# Patient Record
Sex: MALE | Race: BLACK OR AFRICAN AMERICAN | Employment: FULL TIME | URBAN - METROPOLITAN AREA
[De-identification: names, ages, dates, MRNs, and addresses within clinical notes are randomized per-mention and may not be internally consistent; named-entity substitution may affect disease eponyms.]

---

## 2017-02-02 RX ORDER — AMLODIPINE BESYLATE 5 MG/1
TABLET ORAL
Qty: 120 TAB | Refills: 12 | Status: SHIPPED | OUTPATIENT
Start: 2017-02-02 | End: 2018-04-08 | Stop reason: SDUPTHER

## 2017-02-02 RX ORDER — HUMAN INSULIN 100 [IU]/ML
INJECTION, SUSPENSION SUBCUTANEOUS
Qty: 80 VIAL | Refills: 0 | Status: SHIPPED | OUTPATIENT
Start: 2017-02-02 | End: 2017-02-17 | Stop reason: SDUPTHER

## 2017-02-02 RX ORDER — PEN NEEDLE, DIABETIC 29 G X1/2"
NEEDLE, DISPOSABLE MISCELLANEOUS
Qty: 100 SYRINGE | Refills: 12 | Status: SHIPPED | OUTPATIENT
Start: 2017-02-02 | End: 2018-03-30 | Stop reason: SDUPTHER

## 2017-02-17 ENCOUNTER — OFFICE VISIT (OUTPATIENT)
Dept: ENDOCRINOLOGY | Age: 57
End: 2017-02-17

## 2017-02-17 VITALS
SYSTOLIC BLOOD PRESSURE: 148 MMHG | BODY MASS INDEX: 28.37 KG/M2 | DIASTOLIC BLOOD PRESSURE: 88 MMHG | HEART RATE: 54 BPM | HEIGHT: 68 IN | WEIGHT: 187.2 LBS

## 2017-02-17 DIAGNOSIS — E10.319 UNCONTROLLED TYPE 1 DIABETES MELLITUS WITH RETINOPATHY, MACULAR EDEMA PRESENCE UNSPECIFIED, UNSPECIFIED RETINOPATHY SEVERITY: Primary | ICD-10-CM

## 2017-02-17 DIAGNOSIS — E78.00 HYPERCHOLESTEROLEMIA: ICD-10-CM

## 2017-02-17 DIAGNOSIS — I10 ESSENTIAL HYPERTENSION, BENIGN: ICD-10-CM

## 2017-02-17 DIAGNOSIS — E10.65 UNCONTROLLED TYPE 1 DIABETES MELLITUS WITH RETINOPATHY, MACULAR EDEMA PRESENCE UNSPECIFIED, UNSPECIFIED RETINOPATHY SEVERITY: Primary | ICD-10-CM

## 2017-02-17 LAB — HBA1C MFR BLD HPLC: 8.3 %

## 2017-02-17 NOTE — PROGRESS NOTES
Chief Complaint   Patient presents with    Diabetes     pcp and pharmacy confirmed    Labs     done     History of Present Illness: Ashlie Mejia is a 60 y.o. male here for follow up of diabetes. Weight up 7 lbs since last visit in 10/16. Has still been taking amlodipine 2 tabs every morning. Ended up stopping the evening dose of doxazosin sometime in the past 3 months as he was having dizziness with this and this has stopped with being off this pill. Does check his BP at home and mostly gets readings in the 120-130s but has seen some in the 140s but none over 150 recently. Did have an episode of low blood sugar in the middle of the night in 11/16 and his wife called EMS and sugar was down to 30 and he was treated and never needed to go to the ER. Review of his log book shows widely fluctuating readings as low as the 30s to some over 400s and at times will eat something at bedtime like 1/2 pb sandwich when his sugar is under 130 at bedtime and then can have a high reading the next morning. Current Outpatient Prescriptions   Medication Sig    insulin NPH/insulin regular (NOVOLIN 70/30) 100 unit/mL (70-30) injection INJECT 30 UNITS SUBCUTANEOUSLY IN THE MORNING AND 30 UNITS IN THE EVENING + 4 units for every 50 mg/dl above 150 mg/dl--Dose change 2/17/17--updated med list--did not send prescription to the pharmacy    amLODIPine (NORVASC) 5 mg tablet TAKE 2 TABLETS BY MOUTH DAILY    BD INSULIN SYRINGE ULTRA-FINE 1 mL 30 gauge x 1/2\" syrg USE AS DIRECTED    tadalafil (CIALIS) 20 mg tablet TAKE 1 TABLET BY MOUTH 1 HOUR PRIOR TO SEX ON EMPTY STOMACH    acetaminophen (TYLENOL) 325 mg tablet Take  by mouth every four (4) hours as needed for Pain.  glucose blood VI test strips (ONETOUCH ULTRA TEST) strip USE AS DIRECTED    atorvastatin (LIPITOR) 80 mg tablet TAKE 1 TABLET BY MOUTH EVERY DAY    doxazosin (CARDURA) 2 mg tablet Take 2 mg by mouth nightly.      No current facility-administered medications for this visit. Allergies   Allergen Reactions    Benazepril Other (comments)     hyperkalemia     Review of Systems:  - Eyes: no blurry vision or double vision  - Cardiovascular: no chest pain  - Respiratory: no shortness of breath  - Musculoskeletal: no myalgias  - Neurological: no numbness/tingling in extremities    Physical Examination:  Blood pressure 152/73, pulse (!) 54, height 5' 8\" (1.727 m), weight 187 lb 3.2 oz (84.9 kg). Repeat manually 148/88 in left arm.  - General: pleasant, no distress, good eye contact   - Neck: no carotid bruits  - Cardiovascular: regular, normal rate, nl s1 and s2, no m/r/g,   - Respiratory: clear bilaterally  - Integumentary: no edema,   - Psychiatric: normal mood and affect    Data Reviewed:   Component      Latest Ref Rng & Units 2/17/2017           4:20 PM   Hemoglobin A1c (POC)      % 8.3       Assessment/Plan:     1. Type 1 diabetes, uncontrolled, with retinopathy ([facility] [address]): his most recent Hgb A1c was 8.3% in 2/17 down from 9.5% in 10/16 stable from 7/16 down from 10% in 3/16. Control is improving but still having a lot of variability in his readings. Will increase his morning dose to get pre-dinner sugars higher and decrease dinner dose to avoid overnight lows. May benefit from basal/bolus regimen in the future but has been on pre-mix the whole time so will continue this for now. - increase 70/30 insulin to 36 units before breakfast and decrease to 24 dinner + 4 units for every 50 mg/dl above 150 mg/dl  - check bs 3 times per day  - foot exam done 7/16  - microalbumin 106 in 3/16, down to 57 in 10/16--may need to add back low dose ACE in the future but will be careful of hyperkalemia as had this with benazepril  - due for eye exam  - check Hgb A1c, cmp, and microalbumin prior to next visit      2. Essential hypertension, benign: his BP was above goal < 140/90 off the doxazosin so will restart 1/2 tab at bedtime.   Does have history of hyperaldosteronism that was surgically treated by left adrenalectomy in 3/12.  - cont amlodipine 5 mg 2 tabs daily  - restart doxazosin 2 mg 1/2 tab at bedtime  - check bmp today      3. Hypercholesterolemia: Given DM, Goal LDL < 100, non-HDL < 130, and TG < 150.  in 3/16. Up to 171 in 7/16 with non-compliance but down to 93 in 10/16 with compliance  - cont lipitor 80 mg daily  - check lipids prior to next visit       Patient Instructions   1) Take your insulin 10-15 minutes BEFORE you eat breakfast and dinner and adjust the dose based on the scale below:  Blood sugar  Breakfast   Dinner          Less than 90  Eat first, take 26 units  Eat first, take 20     36 units   24     151-200  40 units   28   201-250  44 units   32  251-300  48 units   36   301-350  52 units   40   351-400  56 units   44  401-450  60 units   48  451-500  64 units   52  501-550  68 units   56  551-600  72 units   60  Over 600  76 units   65    2) Collect readings over the next 2 weeks and drop them off at my office for both sugar and blood pressure. 3) Restart 1/2 of the 2 mg doxazosin tab at bedtime. The goal is to keep all of your blood pressure readings under 140/90. 4) Your Hemoglobin A1c (3 month test of blood sugar) was 8.3% which is a good improvement but I'm concerned it may be lower due to a lot of the low readings you had under 60. We will try to get a similar or better A1c with less variability of your readings. 5) I will mail you a lab slip about 3-4 weeks before your next visit to have your labs repeated 3-4 days prior to your next visit. Follow-up Disposition:  Return in about 4 months (around 6/17/2017).     Copy sent to:  Dr. Francis Ray via [employer] [facility]    Lab follow up: 2/18/17    Sent him the following message in a letter:    METABOLIC PANEL, BASIC   Result Value Ref Range    Glucose 195 (H) 65 - 99 mg/dL    BUN 20 6 - 24 mg/dL    Creatinine 1.39 (H) 0.76 - 1.27 mg/dL    GFR est non-AA 55 (L) >59 mL/min/1.73    GFR est AA 64 >59 mL/min/1.73    BUN/Creatinine ratio 14 9 - 20    Sodium 144 134 - 144 mmol/L    Potassium 4.7 3.5 - 5.2 mmol/L    Chloride 104 96 - 106 mmol/L    CO2 27 18 - 29 mmol/L    Calcium 9.4 8.7 - 10.2 mg/dL    Narrative    Performed at:  [address]  [id number]  : Jarvis Cardoza MD, Phone:  [phone]   CKD REPORT   Result Value Ref Range    Interpretation Note       Comment:      Supplement report is available. Narrative    Performed at:  [facility] [place]  [address] [place] [address]  [id number]  : Carlos Borges PhD, Phone:  [phone]       BUN and creatinine are markers of kidney function. Your creatinine is slightly abnormal and up from 1.28 at last check. Hopefully with better control of your diabetes, we will prevent any worsening of your kidney function.     Your electrolytes (sodium, potassium, and calcium) are all normal.

## 2017-02-17 NOTE — MR AVS SNAPSHOT
Visit Information Date & Time Provider Department Dept. Phone Encounter #  
 2/17/2017  3:30 PM Rex Nicole, [address] [facility] [phone] [id number] Follow-up Instructions Return in about 4 months (around 6/17/2017). Your Appointments 3/27/2017  3:45 PM  
ROUTINE CARE with Tamanna Turpin MD  
[facility] [address]) Appt Note: 6 mon fu/ Labs; same [address] [place] [phone] [phone] [address] [facility] [address] Upcoming Health Maintenance Date Due HEMOGLOBIN A1C Q6M 4/10/2017 FOOT EXAM Q1 7/25/2017 MICROALBUMIN Q1 10/10/2017 LIPID PANEL Q1 10/10/2017 EYE EXAM RETINAL OR DILATED Q1 11/30/2017 COLONOSCOPY 4/10/2019 Pneumococcal 19-64 Highest Risk (3 of 3 - PPSV23) 9/15/2021 DTaP/Tdap/Td series (2 - Td) 9/15/2026 Allergies as of 2/17/2017  Review Complete On: 2/17/2017 By: Rex Nicole MD  
  
 Severity Noted Reaction Type Reactions Benazepril  09/21/2015    Other (comments)  
 hyperkalemia Current Immunizations  Reviewed on 1/18/2013 Name Date Influenza Vaccine 10/31/2013, 10/29/2012 Influenza Vaccine Split 9/30/2010 Influenza Vaccine Whole 11/28/2011 Pneumococcal Vaccine (Pcv) 10/30/2004 Not reviewed this visit You Were Diagnosed With   
  
 Codes Comments Uncontrolled type 1 diabetes mellitus with retinopathy, macular edema presence unspecified, unspecified retinopathy severity ([facility] [address]    -  Primary ICD-10-CM: E10.319, E10.65 ICD-9-CM: 250.53, 362.01 Essential hypertension, benign     ICD-10-CM: I10 
ICD-9-CM: 401.1 Hypercholesterolemia     ICD-10-CM: E78.00 ICD-9-CM: 272.0 Vitals BP Pulse Height(growth percentile) Weight(growth percentile) BMI Smoking Status  152/73 (BP 1 Location: Left arm, BP Patient Position: Sitting) (!) 54 5' 8\" (1.727 m) 187 lb 3.2 oz (84.9 kg) 28.46 kg/m2 Never Smoker Vitals History BMI and BSA Data Body Mass Index Body Surface Area  
 28.46 kg/m 2 2.02 m 2 Preferred Pharmacy Pharmacy Name Phone [address] [facility] Liza Sutherland [phone] Your Updated Medication List  
  
   
This list is accurate as of: 2/17/17  4:42 PM.  Always use your most recent med list. amLODIPine 5 mg tablet Commonly known as:  Funez [place] TAKE 2 TABLETS BY MOUTH DAILY  
  
 atorvastatin 80 mg tablet Commonly known as:  LIPITOR  
TAKE 1 TABLET BY MOUTH EVERY DAY  
  
 BD INSULIN SYRINGE ULTRA-FINE 1 mL 30 gauge x 1/2\" Syrg Generic drug:  Insulin Syringe-Needle U-100  
USE AS DIRECTED  
  
 doxazosin 2 mg tablet Commonly known as:  CARDURA Take 1 mg by mouth nightly. glucose blood VI test strips strip Commonly known as:  ONETOUCH ULTRA TEST  
USE AS DIRECTED  
  
 insulin NPH/insulin regular 100 unit/mL (70-30) injection Commonly known as:  NovoLIN 70/30 INJECT 36 UNITS SUBCUTANEOUSLY IN THE MORNING AND 24 UNITS IN THE EVENING + 4 units for every 50 mg/dl above 150 mg/dl--Dose change 2/17/17--updated med list--did not send prescription to the pharmacy  
  
 tadalafil 20 mg tablet Commonly known as:  CIALIS  
TAKE 1 TABLET BY MOUTH 1 HOUR PRIOR TO SEX ON EMPTY STOMACH  
  
 TYLENOL 325 mg tablet Generic drug:  acetaminophen Take  by mouth every four (4) hours as needed for Pain. We Performed the Following AMB POC HEMOGLOBIN A1C [10226 CPT(R)] METABOLIC PANEL, BASIC [82681 CPT(R)] OR COLLECTION VENOUS BLOOD,VENIPUNCTURE D907029 CPT(R)] OR HANDLG&/OR CONVEY OF SPEC FOR TR OFFICE TO LAB [68874 CPT(R)] Follow-up Instructions Return in about 4 months (around 6/17/2017). Patient Instructions 1) Take your insulin 10-15 minutes BEFORE you eat breakfast and dinner and adjust the dose based on the scale below: 
Blood sugar  Breakfast   Dinner Less than 90  Eat first, take 26 units  Eat first, take 20 
   36 units   24    
151-200  40 units   28  
201-250  44 units   32 
251-300  48 units   36  
301-350  52 units   40  
351-400  56 units   44 
401-450  60 units   48 
451-500  64 units   52 
501-550  68 units   56 
551-600  72 units   60 Over 600  76 units   65 
 
2) Collect readings over the next 2 weeks and drop them off at my office for both sugar and blood pressure. 3) Restart 1/2 of the 2 mg doxazosin tab at bedtime. The goal is to keep all of your blood pressure readings under 140/90. 4) Your Hemoglobin A1c (3 month test of blood sugar) was 8.3% which is a good improvement but I'm concerned it may be lower due to a lot of the low readings you had under 60. We will try to get a similar or better A1c with less variability of your readings. 5) I will mail you a lab slip about 3-4 weeks before your next visit to have your labs repeated 3-4 days prior to your next visit. Introducing [address]! [employer] [facility] introduces [employer] patient portal. Now you can access parts of your medical record, email your doctor's office, and request medication refills online. 1. In your internet browser, go to [email or web address] 2. Click on the First Time User? Click Here link in the Sign In box. You will see the New Member Sign Up page. 3. Enter your [employer] Access Code exactly as it appears below. You will not need to use this code after youve completed the sign-up process. If you do not sign up before the expiration date, you must request a new code. · [employer] Access Code: [email or web address] Expires: 5/18/2017  3:37 PM 
 
4. Enter the last four digits of your Social Security Number (xxxx) and Date of Birth (mm/dd/yyyy) as indicated and click Submit. You will be taken to the next sign-up page. 5. Create a SocialThreader ID. This will be your SocialThreader login ID and cannot be changed, so think of one that is secure and easy to remember. 6. Create a SocialThreader password. You can change your password at any time. 7. Enter your Password Reset Question and Answer. This can be used at a later time if you forget your password. 8. Enter your e-mail address. You will receive e-mail notification when new information is available in [address] 9. Click Sign Up. You can now view and download portions of your medical record. 10. Click the Download Summary menu link to download a portable copy of your medical information. If you have questions, please visit the Frequently Asked Questions section of the SocialThreader website. Remember, Social[employer]er is NOT to be used for urgent needs. For medical emergencies, dial 911. Now available from your iPhone and Android! Please provide this summary of care documentation to your next provider. Your primary care clinician is listed as Amanda Bansal. If you have any questions after today's visit, please call [phone].

## 2017-02-17 NOTE — PATIENT INSTRUCTIONS
1) Take your insulin 10-15 minutes BEFORE you eat breakfast and dinner and adjust the dose based on the scale below:  Blood sugar  Breakfast   Dinner          Less than 90  Eat first, take 26 units  Eat first, take 20     36 units   24     151-200  40 units   28   201-250  44 units   32  251-300  48 units   36   301-350  52 units   40   351-400  56 units   44  401-450  60 units   48  451-500  64 units   52  501-550  68 units   56  551-600  72 units   60  Over 600  76 units   65    2) Collect readings over the next 2 weeks and drop them off at my office for both sugar and blood pressure. 3) Restart 1/2 of the 2 mg doxazosin tab at bedtime. The goal is to keep all of your blood pressure readings under 140/90. 4) Your Hemoglobin A1c (3 month test of blood sugar) was 8.3% which is a good improvement but I'm concerned it may be lower due to a lot of the low readings you had under 60. We will try to get a similar or better A1c with less variability of your readings. 5) I will mail you a lab slip about 3-4 weeks before your next visit to have your labs repeated 3-4 days prior to your next visit.

## 2017-02-18 LAB
BUN SERPL-MCNC: 20 MG/DL (ref 6–24)
BUN/CREAT SERPL: 14 (ref 9–20)
CALCIUM SERPL-MCNC: 9.4 MG/DL (ref 8.7–10.2)
CHLORIDE SERPL-SCNC: 104 MMOL/L (ref 96–106)
CO2 SERPL-SCNC: 27 MMOL/L (ref 18–29)
CREAT SERPL-MCNC: 1.39 MG/DL (ref 0.76–1.27)
GLUCOSE SERPL-MCNC: 195 MG/DL (ref 65–99)
INTERPRETATION: NORMAL
POTASSIUM SERPL-SCNC: 4.7 MMOL/L (ref 3.5–5.2)
SODIUM SERPL-SCNC: 144 MMOL/L (ref 134–144)

## 2017-03-17 ENCOUNTER — TELEPHONE (OUTPATIENT)
Dept: ENDOCRINOLOGY | Age: 57
End: 2017-03-17

## 2017-03-17 NOTE — TELEPHONE ENCOUNTER
I have received patients home glucose log dated 2/17 - 3/9/16. Per Dr. Casimiro Salvador note he is on the current regimen (please also confirm with patient that they are taking the following:     Mixed 70/30 insulin:   - 36 units with breakfast  - 24 units with dinner  - Correction sliding scale (4:50>150)    Review of blood sugars:  AM: Variable, , occasional 57, 64, 300's about 1x/week   Dinner: 789-925 with nothing lower  Bedtime: Variable  (mostly 160-230)    Blood pressures have been pretty stable past two weeks as well. Plan:   My suspicion is that we need to again increase the AM dose, but with careful attention to the evening dose to avoid lows at bedtime. We will increase AM dose by 10%. With respect to being mostly high at bedtime, not so sure that a reduction is warranted, however patient to have glucose tabs available at bedside for the occasional low. Increase Breakfast dose to 40 units  Continue Dinner dose of 24 untis  Continue to check glucose AM/Dinner/HS    Recommend they send another glucose log after 1-2 weeks for Dr. KEITH [facility] to review, I will forward this message for him to review.

## 2017-03-22 ENCOUNTER — TELEPHONE (OUTPATIENT)
Dept: ENDOCRINOLOGY | Age: 57
End: 2017-03-22

## 2017-03-22 NOTE — TELEPHONE ENCOUNTER
Patient is returning a call from 03/17/2017. He states that [place] gave  her name, however did not leave a message. Patient is wondering if you would know the nature of the call? Patient contact: [phone] (Home).     Thanks  Benjamin Vega

## 2017-03-27 ENCOUNTER — OFFICE VISIT (OUTPATIENT)
Dept: FAMILY MEDICINE CLINIC | Age: 57
End: 2017-03-27

## 2017-03-27 VITALS
HEIGHT: 68 IN | RESPIRATION RATE: 14 BRPM | OXYGEN SATURATION: 98 % | HEART RATE: 54 BPM | TEMPERATURE: 98.3 F | DIASTOLIC BLOOD PRESSURE: 61 MMHG | BODY MASS INDEX: 28.04 KG/M2 | WEIGHT: 185 LBS | SYSTOLIC BLOOD PRESSURE: 133 MMHG

## 2017-03-27 DIAGNOSIS — E78.00 HYPERCHOLESTEROLEMIA: Primary | ICD-10-CM

## 2017-03-27 RX ORDER — TADALAFIL 5 MG/1
TABLET ORAL
COMMUNITY
Start: 2016-12-05 | End: 2016-12-05

## 2017-03-27 RX ORDER — ATORVASTATIN CALCIUM 40 MG/1
TABLET, FILM COATED ORAL
COMMUNITY
Start: 2016-12-05 | End: 2016-12-05

## 2017-03-27 RX ORDER — DOXAZOSIN 2 MG/1
1 TABLET ORAL
Qty: 45 TAB | Refills: 3 | Status: SHIPPED | OUTPATIENT
Start: 2017-03-27 | End: 2018-10-15

## 2017-03-27 NOTE — PROGRESS NOTES
Chief Complaint   Patient presents with    Hypertension    Diabetes    Cholesterol Problem     1. Have you been to the ER, urgent care clinic since your last visit? Hospitalized since your last visit? No    2. Have you seen or consulted any other health care providers outside of the [address] [place] [address] since your last visit? Include any pap smears or colon screening. No    There are no preventive care reminders to display for this patient.

## 2017-03-27 NOTE — MR AVS SNAPSHOT
Visit Information Date & Time Provider Department Dept. Phone Encounter #  
 3/27/2017  3:45 PM Owen Walker MD [facility] [phone] [id number] Follow-up Instructions Return in about 6 months (around 9/27/2017). Your Appointments 6/26/2017  3:30 PM  
Follow Up with Lester Corbett MD  
[facility] [address] [place] [address]) Appt Note: 4m f/u  
 [address] [phone] [address] [facility] Upcoming Health Maintenance Date Due  
 FOOT EXAM Q1 7/25/2017 HEMOGLOBIN A1C Q6M 8/17/2017 MICROALBUMIN Q1 10/10/2017 LIPID PANEL Q1 10/10/2017 EYE EXAM RETINAL OR DILATED Q1 11/30/2017 COLONOSCOPY 4/10/2019 Pneumococcal 19-64 Highest Risk (3 of 3 - PPSV23) 9/15/2021 DTaP/Tdap/Td series (2 - Td) 9/15/2026 Allergies as of 3/27/2017  Review Complete On: 3/27/2017 By: Owen Walker MD  
  
 Severity Noted Reaction Type Reactions Benazepril  09/21/2015    Other (comments)  
 hyperkalemia Current Immunizations  Reviewed on 1/18/2013 Name Date Influenza Vaccine 10/31/2013, 10/29/2012 Influenza Vaccine Split 9/30/2010 Influenza Vaccine Whole 11/28/2011 Pneumococcal Vaccine (Pcv) 10/30/2004 Not reviewed this visit You Were Diagnosed With   
  
 Codes Comments Hypercholesterolemia    -  Primary ICD-10-CM: E78.00 ICD-9-CM: 272.0 Vitals BP Pulse Temp Resp Height(growth percentile) Weight(growth percentile) 133/61 (!) 54 98.3 °F (36.8 °C) (Oral) 14 5' 8\" (1.727 m) 185 lb (83.9 kg) SpO2 BMI Smoking Status 98% 28.13 kg/m2 Never Smoker Vitals History BMI and BSA Data Body Mass Index Body Surface Area  
 28.13 kg/m 2 2.01 m 2 Preferred Pharmacy Pharmacy Name Phone  [address] [facility], [facility] [phone] Your Updated Medication List  
  
   
This list is accurate as of: 3/27/17  4:42 PM.  Always use your most recent med list. amLODIPine 5 mg tablet Commonly known as:  Poole [place] TAKE 2 TABLETS BY MOUTH DAILY  
  
 atorvastatin 80 mg tablet Commonly known as:  LIPITOR  
TAKE 1 TABLET BY MOUTH EVERY DAY  
  
 BD INSULIN SYRINGE ULTRA-FINE 1 mL 30 gauge x 1/2\" Syrg Generic drug:  Insulin Syringe-Needle U-100  
USE AS DIRECTED  
  
 doxazosin 2 mg tablet Commonly known as:  CARDURA Take 0.5 Tabs by mouth nightly. glucose blood VI test strips strip Commonly known as:  ONETOUCH ULTRA TEST  
USE AS DIRECTED  
  
 insulin NPH/insulin regular 100 unit/mL (70-30) injection Commonly known as:  NovoLIN 70/30 INJECT 40 UNITS SUBCUTANEOUSLY IN THE MORNING AND 24 UNITS IN THE EVENING + 4 units for every 50 mg/dl above 150 mg/dl--Dose change 3/22/17--updated med list--did not send prescription to the pharmacy  
  
 tadalafil 20 mg tablet Commonly known as:  CIALIS  
TAKE 1 TABLET BY MOUTH 1 HOUR PRIOR TO SEX ON EMPTY STOMACH  
  
 TYLENOL 325 mg tablet Generic drug:  acetaminophen Take  by mouth every four (4) hours as needed for Pain. Prescriptions Sent to Pharmacy Refills  
 doxazosin (CARDURA) 2 mg tablet 3 Sig: Take 0.5 Tabs by mouth nightly. Class: Normal  
 Pharmacy: [address] [place] [address] [id number]  #: [phone] Route: Oral  
  
We Performed the Following LIPID PANEL [49273 CPT(R)] Follow-up Instructions Return in about 6 months (around 9/27/2017). Introducing [facility]! Graeme Julien introduces [employer] patient portal. Now you can access parts of your medical record, email your doctor's office, and request medication refills online. 1. In your internet browser, go to [email or web address] 2. Click on the First Time User? Click Here link in the Sign In box. You will see the New Member Sign Up page. 3. Enter your [employer] Access Code exactly as it appears below. You will not need to use this code after youve completed the sign-up process. If you do not sign up before the expiration date, you must request a new code. · [employer] Access Code: [email or web address] Expires: 5/18/2017  4:37 PM 
 
4. Enter the last four digits of your Social Security Number (xxxx) and Date of Birth (mm/dd/yyyy) as indicated and click Submit. You will be taken to the next sign-up page. 5. Create a [employer] ID. This will be your [employer] login ID and cannot be changed, so think of one that is secure and easy to remember. 6. Create a [employer] password. You can change your password at any time. 7. Enter your Password Reset Question and Answer. This can be used at a later time if you forget your password. 8. Enter your e-mail address. You will receive e-mail notification when new information is available in [address] 9. Click Sign Up. You can now view and download portions of your medical record. 10. Click the Download Summary menu link to download a portable copy of your medical information. If you have questions, please visit the Frequently Asked Questions section of the [employer] website. Remember, [employer] is NOT to be used for urgent needs. For medical emergencies, dial 911. Now available from your iPhone and Android! Please provide this summary of care documentation to your next provider. Your primary care clinician is listed as Jessa Santos. If you have any questions after today's visit, please call [phone].

## 2017-03-27 NOTE — PROGRESS NOTES
HISTORY OF PRESENT ILLNESS  Jory Gómez is a 60 y.o. male. HPI Pt. Comes in for blood pressure, cholesterol, and diabetes check. Followed by Dr. Gayathri Valdez for diabetes. No complaints of chest pain, shortness of breath, TIAs, claudication or edema. Daughter going to [address] [place] [address] as a freshman. ROS    Physical Exam   Constitutional: He appears well-developed and well-nourished. HENT:   Right Ear: External ear normal.   Left Ear: External ear normal.   Mouth/Throat: Oropharynx is clear and moist.   Neck: No thyromegaly present. Cardiovascular: Normal rate, regular rhythm, normal heart sounds and intact distal pulses. Pulmonary/Chest: Effort normal and breath sounds normal. No respiratory distress. He has no wheezes. Abdominal: Soft. Bowel sounds are normal. He exhibits no distension and no mass. There is no tenderness. There is no guarding. Musculoskeletal: Normal range of motion. He exhibits no edema. Lymphadenopathy:     He has no cervical adenopathy. Nursing note and vitals reviewed. ASSESSMENT and PLAN  Orders Placed This Encounter    LIPID PANEL    doxazosin (CARDURA) 2 mg tablet     Peterson Carbajal was seen today for hypertension, diabetes and cholesterol problem. Diagnoses and all orders for this visit:    Hypercholesterolemia  -     LIPID PANEL    Other orders  -     doxazosin (CARDURA) 2 mg tablet; Take 0.5 Tabs by mouth nightly. Follow-up Disposition:  Return in about 6 months (around 9/27/2017).

## 2017-03-28 LAB
CHOLEST SERPL-MCNC: 182 MG/DL (ref 100–199)
HDLC SERPL-MCNC: 73 MG/DL
INTERPRETATION, 910389: NORMAL
LDLC SERPL CALC-MCNC: 94 MG/DL (ref 0–99)
TRIGL SERPL-MCNC: 77 MG/DL (ref 0–149)
VLDLC SERPL CALC-MCNC: 15 MG/DL (ref 5–40)

## 2017-04-21 RX ORDER — BLOOD SUGAR DIAGNOSTIC
STRIP MISCELLANEOUS
Qty: 300 STRIP | Refills: 3 | Status: SHIPPED | OUTPATIENT
Start: 2017-04-21 | End: 2018-07-06

## 2017-04-21 RX ORDER — ATORVASTATIN CALCIUM 80 MG/1
TABLET, FILM COATED ORAL
Qty: 90 TAB | Refills: 1 | Status: SHIPPED | OUTPATIENT
Start: 2017-04-21 | End: 2018-07-31 | Stop reason: SDUPTHER

## 2017-05-17 ENCOUNTER — TELEPHONE (OUTPATIENT)
Dept: ENDOCRINOLOGY | Age: 57
End: 2017-05-17

## 2017-05-17 DIAGNOSIS — E78.00 HYPERCHOLESTEROLEMIA: Primary | ICD-10-CM

## 2017-05-17 DIAGNOSIS — E10.319 UNCONTROLLED TYPE 1 DIABETES MELLITUS WITH RETINOPATHY, MACULAR EDEMA PRESENCE UNSPECIFIED, UNSPECIFIED LATERALITY, UNSPECIFIED RETINOPATHY SEVERITY: ICD-10-CM

## 2017-05-17 DIAGNOSIS — E10.65 UNCONTROLLED TYPE 1 DIABETES MELLITUS WITH RETINOPATHY, MACULAR EDEMA PRESENCE UNSPECIFIED, UNSPECIFIED LATERALITY, UNSPECIFIED RETINOPATHY SEVERITY: ICD-10-CM

## 2017-05-17 NOTE — TELEPHONE ENCOUNTER
----- Message from Sophia Bee MD sent at 5/17/2017  8:19 AM EDT -----      ----- Message -----     From: Sophia Bee MD     Sent: 5/17/2017       To: Sophia Bee MD    Mail him a lab slip

## 2017-06-20 LAB
ALBUMIN SERPL-MCNC: 4.3 G/DL (ref 3.5–5.5)
ALBUMIN/CREAT UR: 57.5 MG/G CREAT (ref 0–30)
ALBUMIN/GLOB SERPL: 1.5 {RATIO} (ref 1.2–2.2)
ALP SERPL-CCNC: 106 IU/L (ref 39–117)
ALT SERPL-CCNC: 17 IU/L (ref 0–44)
AST SERPL-CCNC: 16 IU/L (ref 0–40)
BILIRUB SERPL-MCNC: 0.4 MG/DL (ref 0–1.2)
BUN SERPL-MCNC: 33 MG/DL (ref 6–24)
BUN/CREAT SERPL: 22 (ref 9–20)
CALCIUM SERPL-MCNC: 9.5 MG/DL (ref 8.7–10.2)
CHLORIDE SERPL-SCNC: 101 MMOL/L (ref 96–106)
CHOLEST SERPL-MCNC: 200 MG/DL (ref 100–199)
CO2 SERPL-SCNC: 27 MMOL/L (ref 18–29)
CREAT SERPL-MCNC: 1.49 MG/DL (ref 0.76–1.27)
CREAT UR-MCNC: 161.5 MG/DL
EST. AVERAGE GLUCOSE BLD GHB EST-MCNC: 212 MG/DL
GLOBULIN SER CALC-MCNC: 2.9 G/DL (ref 1.5–4.5)
GLUCOSE SERPL-MCNC: 144 MG/DL (ref 65–99)
HBA1C MFR BLD: 9 % (ref 4.8–5.6)
HDLC SERPL-MCNC: 73 MG/DL
INTERPRETATION, 910389: NORMAL
INTERPRETATION: NORMAL
LDLC SERPL CALC-MCNC: 110 MG/DL (ref 0–99)
MICROALBUMIN UR-MCNC: 92.9 UG/ML
PDF IMAGE, 910387: NORMAL
POTASSIUM SERPL-SCNC: 4.6 MMOL/L (ref 3.5–5.2)
PROT SERPL-MCNC: 7.2 G/DL (ref 6–8.5)
SODIUM SERPL-SCNC: 141 MMOL/L (ref 134–144)
TRIGL SERPL-MCNC: 85 MG/DL (ref 0–149)
VLDLC SERPL CALC-MCNC: 17 MG/DL (ref 5–40)

## 2017-06-26 ENCOUNTER — OFFICE VISIT (OUTPATIENT)
Dept: ENDOCRINOLOGY | Age: 57
End: 2017-06-26

## 2017-06-26 VITALS
DIASTOLIC BLOOD PRESSURE: 69 MMHG | SYSTOLIC BLOOD PRESSURE: 132 MMHG | WEIGHT: 181.4 LBS | HEIGHT: 68 IN | HEART RATE: 57 BPM | BODY MASS INDEX: 27.49 KG/M2

## 2017-06-26 DIAGNOSIS — E10.319 UNCONTROLLED TYPE 1 DIABETES MELLITUS WITH RETINOPATHY, MACULAR EDEMA PRESENCE UNSPECIFIED, UNSPECIFIED LATERALITY, UNSPECIFIED RETINOPATHY SEVERITY: Primary | ICD-10-CM

## 2017-06-26 DIAGNOSIS — E10.65 UNCONTROLLED TYPE 1 DIABETES MELLITUS WITH RETINOPATHY, MACULAR EDEMA PRESENCE UNSPECIFIED, UNSPECIFIED LATERALITY, UNSPECIFIED RETINOPATHY SEVERITY: Primary | ICD-10-CM

## 2017-06-26 DIAGNOSIS — E78.00 HYPERCHOLESTEROLEMIA: ICD-10-CM

## 2017-06-26 DIAGNOSIS — I10 ESSENTIAL HYPERTENSION, BENIGN: ICD-10-CM

## 2017-06-26 NOTE — PROGRESS NOTES
Chief Complaint   Patient presents with    Diabetes    Cholesterol Problem     History of Present Illness: Henrique Costa is a 60 y.o. male here for follow up of diabetes. Weight down 6 lbs since last visit in 2/17. Has been taking the doxazosin 1/2 tab about 5 times a week at night and this has helped his BP. Has not been following the scale I gave him as directed for the dinner dose as he is afraid of his sugars going low as he doesn't feel his low sugars as well as he used to. As a result his blood sugars continue to be very erratic with some readings down to the 50-60s fasting and other up over 300. He doesn't write down how much insulin he took so it's difficult to assess trends. He is willing to start keeping better records of his insulin doses so I can help him gain better control. Current Outpatient Prescriptions   Medication Sig    atorvastatin (LIPITOR) 80 mg tablet TAKE 1 TABLET BY MOUTH EVERY DAY    ONETOUCH ULTRA TEST strip USE AS DIRECTED    doxazosin (CARDURA) 2 mg tablet Take 0.5 Tabs by mouth nightly.  insulin NPH/insulin regular (NOVOLIN 70/30) 100 unit/mL (70-30) injection INJECT 40 UNITS SUBCUTANEOUSLY IN THE MORNING AND 24 UNITS IN THE EVENING + 4 units for every 50 mg/dl above 150 mg/dl--Dose change 3/22/17--updated med list--did not send prescription to the pharmacy    amLODIPine (NORVASC) 5 mg tablet TAKE 2 TABLETS BY MOUTH DAILY    BD INSULIN SYRINGE ULTRA-FINE 1 mL 30 gauge x 1/2\" syrg USE AS DIRECTED    tadalafil (CIALIS) 20 mg tablet TAKE 1 TABLET BY MOUTH 1 HOUR PRIOR TO SEX ON EMPTY STOMACH    acetaminophen (TYLENOL) 325 mg tablet Take  by mouth every four (4) hours as needed for Pain. No current facility-administered medications for this visit.       Allergies   Allergen Reactions    Benazepril Other (comments)     hyperkalemia     Review of Systems:  - Eyes: no blurry vision or double vision  - Cardiovascular: no chest pain  - Respiratory: no shortness of breath  - Musculoskeletal: no myalgias  - Neurological: no numbness/tingling in extremities    Physical Examination:  Blood pressure 132/69, pulse (!) 57, height 5' 8\" (1.727 m), weight 181 lb 6.4 oz (82.3 kg). - General: pleasant, no distress, good eye contact   - Neck: no carotid bruits  - Cardiovascular: regular, normal rate, nl s1 and s2, no m/r/g,   - Respiratory: clear bilaterally  - Integumentary: no edema,   - Psychiatric: normal mood and affect    Data Reviewed:   Component      Latest Ref Rng & Units 6/19/2017 6/19/2017 6/19/2017 6/19/2017           3:44 PM  3:44 PM  3:44 PM  3:44 PM   Glucose      65 - 99 mg/dL   144 (H)    BUN      6 - 24 mg/dL   33 (H)    Creatinine      0.76 - 1.27 mg/dL   1.49 (H)    GFR est non-AA      >59 mL/min/1.73   51 (L)    GFR est AA      >59 mL/min/1.73   59 (L)    BUN/Creatinine ratio      9 - 20   22 (H)    Sodium      134 - 144 mmol/L   141    Potassium      3.5 - 5.2 mmol/L   4.6    Chloride      96 - 106 mmol/L   101    CO2      18 - 29 mmol/L   27    Calcium      8.7 - 10.2 mg/dL   9.5    Protein, total      6.0 - 8.5 g/dL   7.2    Albumin      3.5 - 5.5 g/dL   4.3    GLOBULIN, TOTAL      1.5 - 4.5 g/dL   2.9    A-G Ratio      1.2 - 2.2   1.5    Bilirubin, total      0.0 - 1.2 mg/dL   0.4    Alk. phosphatase      39 - 117 IU/L   106    AST      0 - 40 IU/L   16    ALT (SGPT)      0 - 44 IU/L   17    Cholesterol, total      100 - 199 mg/dL  200 (H)     Triglyceride      0 - 149 mg/dL  85     HDL Cholesterol      >39 mg/dL  73     VLDL, calculated      5 - 40 mg/dL  17     LDL, calculated      0 - 99 mg/dL  110 (H)     Creatinine, urine      Not Estab. mg/dL 161.5      Microalbumin, urine      Not Estab. ug/mL 92.9      Microalbumin/Creat. Ratio      0.0 - 30.0 mg/g creat 57.5 (H)      Hemoglobin A1c, (calculated)      4.8 - 5.6 %    9.0 (H)   Estimated average glucose      mg/dL    212       Assessment/Plan:     1.  Type 1 diabetes, uncontrolled, with retinopathy (RUSTca 75.): his most recent Hgb A1c was 9% in 6/17 up from 8.3% in 2/17 down from 9.5% in 10/16 stable from 7/16 down from 10% in 3/16. Control is wores and still having a lot of variability in his readings. Will make his dinner scale less aggressive to avoid overnight lows. May benefit from basal/bolus regimen in the future but has been on pre-mix the whole time so will continue this for now. - cont 70/30 insulin 40 units before breakfast + 4 units for every 50 mg/dl above 150 mg/dl and 24 dinner but take 2 units for every 50> 150  - check bs 3 times per day  - foot exam done 7/16  - microalbumin 106 in 3/16, down to 57 in 10/16 and stable in 6/17--may need to add back low dose ACE in the future but will be careful of hyperkalemia as had this with benazepril  - due for eye exam  - check Hgb A1c, cmp, and microalbumin prior to next visit      2. Essential hypertension, benign: his BP was at goal < 140/90 on the doxazosin. Does have history of hyperaldosteronism that was surgically treated by left adrenalectomy in 3/12.  - cont amlodipine 5 mg 2 tabs daily  - cont doxazosin 2 mg 1/2 tab at bedtime      3. Hypercholesterolemia: Given DM, Goal LDL < 100, non-HDL < 130, and TG < 150.  in 3/16. Up to 171 in 7/16 with non-compliance but down to 93 in 10/16 with compliance and 94 in 3/17 and 110 in 6/17  - cont lipitor 80 mg daily  - check lipids prior to next visit       Patient Instructions   1) Your Hemoglobin A1c is a 3 month marker of your diabetes control. Goal is less than 7.5% which means your average blood sugar is less than 170. Your Hemoglobin A1c is 9% which means your diabetes is under worse control than 8.3% at your last check. Continue to work on your diet and exercise and take all your medications as directed. 2) Check your sugar 3 times a day before breakfast, dinner and bedtime and write down how much insulin you took at breakfast and dinner and drop off in the next 2 weeks.   Follow the less aggressive below for insulin:    3) Take your insulin 10-15 minutes BEFORE you eat breakfast and dinner and adjust the dose based on the scale below:  Blood sugar  Breakfast   Dinner          Less than 90  Eat first, take 36 units  Eat first, take 20     40 units   24     151-200  44 units   26   201-250  48 units   28  251-300  52 units   30   301-350  56 units   32   351-400  60 units   34  401-450  64 units   36  451-500  68 units   38  501-550  72 units   40  551-600  76 units   42  Over 600  80 units   44    4) Your blood pressure was much better. Keep taking the 1/2 tab of cardura at bedtime. 5) I will send you a reminder letter 3-4 weeks prior to your next visit and you will have the order already in the [facility] system so you can just go sometime in the 3-7 days before the next visit to have your labs drawn. Follow-up Disposition:  Return in about 5 months (around 11/26/2017).     Copy sent to:  Dr. Tonio Lopez via [facility]

## 2017-06-26 NOTE — PATIENT INSTRUCTIONS
1) Your Hemoglobin A1c is a 3 month marker of your diabetes control. Goal is less than 7.5% which means your average blood sugar is less than 170. Your Hemoglobin A1c is 9% which means your diabetes is under worse control than 8.3% at your last check. Continue to work on your diet and exercise and take all your medications as directed. 2) Check your sugar 3 times a day before breakfast, dinner and bedtime and write down how much insulin you took at breakfast and dinner and drop off in the next 2 weeks. Follow the less aggressive below for insulin:    3) Take your insulin 10-15 minutes BEFORE you eat breakfast and dinner and adjust the dose based on the scale below:  Blood sugar  Breakfast   Dinner          Less than 90  Eat first, take 36 units  Eat first, take 20     40 units   24     151-200  44 units   26   201-250  48 units   28  251-300  52 units   30   301-350  56 units   32   351-400  60 units   34  401-450  64 units   36  451-500  68 units   38  501-550  72 units   40  551-600  76 units   42  Over 600  80 units   44    4) Your blood pressure was much better. Keep taking the 1/2 tab of cardura at bedtime. 5) I will send you a reminder letter 3-4 weeks prior to your next visit and you will have the order already in the [facility] system so you can just go sometime in the 3-7 days before the next visit to have your labs drawn.

## 2017-06-26 NOTE — MR AVS SNAPSHOT
Visit Information Date & Time Provider Department Dept. Phone Encounter #  
 6/26/2017  3:30 PM Aneesh Oliva, [address] [facility] Diabetes and Endocrinology [phone] [id number] Follow-up Instructions Return in about 5 months (around 11/26/2017). Your Appointments 9/27/2017  3:45 PM  
ROUTINE CARE with Wilfred Kay MD  
[facility] [address] [facility]) Appt Note: routine follow up  
 [address] [facility] [place] [address] [phone] [address] [facility] [address] Upcoming Health Maintenance Date Due  
 FOOT EXAM Q1 7/25/2017 INFLUENZA AGE 9 TO ADULT 8/1/2017 EYE EXAM RETINAL OR DILATED Q1 11/30/2017 HEMOGLOBIN A1C Q6M 12/19/2017 MICROALBUMIN Q1 6/19/2018 LIPID PANEL Q1 6/19/2018 COLONOSCOPY 4/10/2019 Pneumococcal 19-64 Highest Risk (3 of 3 - PPSV23) 9/15/2021 DTaP/Tdap/Td series (2 - Td) 9/15/2026 Allergies as of 6/26/2017  Review Complete On: 6/26/2017 By: Aneesh Oliva MD  
  
 Severity Noted Reaction Type Reactions Benazepril  09/21/2015    Other (comments)  
 hyperkalemia Current Immunizations  Reviewed on 1/18/2013 Name Date Influenza Vaccine 10/31/2013, 10/29/2012 Influenza Vaccine Split 9/30/2010 Influenza Vaccine Whole 11/28/2011 Pneumococcal Vaccine (Pcv) 10/30/2004 Not reviewed this visit You Were Diagnosed With   
  
 Codes Comments Uncontrolled type 1 diabetes mellitus with retinopathy, macular edema presence unspecified, unspecified laterality, unspecified retinopathy severity    -  Primary ICD-10-CM: E10.319, E10.65 ICD-9-CM: 250.53, 362.01 Essential hypertension, benign     ICD-10-CM: I10 
ICD-9-CM: 401.1 Hypercholesterolemia     ICD-10-CM: E78.00 ICD-9-CM: 272.0 Vitals BP Pulse Height(growth percentile) Weight(growth percentile) BMI Smoking Status 132/69 (BP 1 Location: Left arm, BP Patient Position: Sitting) (!) 57 5' 8\" (1.727 m) 181 lb 6.4 oz (82.3 kg) 27.58 kg/m2 Never Smoker Vitals History BMI and BSA Data Body Mass Index Body Surface Area  
 27.58 kg/m 2 1.99 m 2 Preferred Pharmacy Pharmacy Name Phone [address] [place] [address], [facility] Liza Sutherland [phone] Your Updated Medication List  
  
   
This list is accurate as of: 6/26/17  4:41 PM.  Always use your most recent med list. amLODIPine 5 mg tablet Commonly known as:  María Medici TAKE 2 TABLETS BY MOUTH DAILY  
  
 atorvastatin 80 mg tablet Commonly known as:  LIPITOR  
TAKE 1 TABLET BY MOUTH EVERY DAY  
  
 BD INSULIN SYRINGE ULTRA-FINE 1 mL 30 gauge x 1/2\" Syrg Generic drug:  Insulin Syringe-Needle U-100  
USE AS DIRECTED  
  
 doxazosin 2 mg tablet Commonly known as:  CARDURA Take 0.5 Tabs by mouth nightly. insulin NPH/insulin regular 100 unit/mL (70-30) injection Commonly known as:  NovoLIN 70/30 INJECT 40 UNITS SUBCUTANEOUSLY IN THE MORNING AND 24 UNITS IN THE EVENING + 4 units for every 50 mg/dl above 150 mg/dl--Dose change 6/26/17--updated med list--did not send prescription to the pharmacy ONETOUCH ULTRA TEST strip Generic drug:  glucose blood VI test strips USE AS DIRECTED  
  
 tadalafil 20 mg tablet Commonly known as:  CIALIS  
TAKE 1 TABLET BY MOUTH 1 HOUR PRIOR TO SEX ON EMPTY STOMACH  
  
 TYLENOL 325 mg tablet Generic drug:  acetaminophen Take  by mouth every four (4) hours as needed for Pain. Follow-up Instructions Return in about 5 months (around 11/26/2017). To-Do List   
 10/26/2017 Lab:  HEMOGLOBIN A1C WITH EAG   
  
 10/26/2017 Lab:  LIPID PANEL   
  
 10/26/2017 Lab:  METABOLIC PANEL, COMPREHENSIVE   
  
 10/26/2017 Lab:  MICROALBUMIN, UR, RAND W/ MICROALBUMIN/CREA RATIO Patient Instructions 1) Your Hemoglobin A1c is a 3 month marker of your diabetes control. Goal is less than 7.5% which means your average blood sugar is less than 170. Your Hemoglobin A1c is 9% which means your diabetes is under worse control than 8.3% at your last check. Continue to work on your diet and exercise and take all your medications as directed. 2) Check your sugar 3 times a day before breakfast, dinner and bedtime and write down how much insulin you took at breakfast and dinner and drop off in the next 2 weeks. Follow the less aggressive below for insulin: 
 
3) Take your insulin 10-15 minutes BEFORE you eat breakfast and dinner and adjust the dose based on the scale below: 
Blood sugar  Breakfast   Dinner Less than 90  Eat first, take 36 units  Eat first, take 20 
   40 units   24    
151-200  44 units   26  
201-250  48 units   28 
251-300  52 units   30  
301-350  56 units   32  
351-400  60 units   34 
401-450  64 units   36 
451-500  68 units   38 
501-550  72 units   40 
551-600  76 units   42 Over 600  80 units   44 
 
4) Your blood pressure was much better. Keep taking the 1/2 tab of cardura at bedtime. 5) I will send you a reminder letter 3-4 weeks prior to your next visit and you will have the order already in the [facility] system so you can just go sometime in the 3-7 days before the next visit to have your labs drawn. Introducing [facility]! [facility] introduces Meez patient portal. Now you can access parts of your medical record, email your doctor's office, and request medication refills online. 1. In your internet browser, go to [email or web address] "[email or web address] 2. Click on the First Time User? Click Here link in the Sign In box. You will see the New Member Sign Up page. 3. Enter your Meez Access Code exactly as it appears below. You will not need to use this code after youve completed the sign-up process.  If you do not sign up before the expiration date, you must request a new code. · CupomNow Access Code: [email or web address] Expires: 9/24/2017  4:40 PM 
 
4. Enter the last four digits of your Social Security Number (xxxx) and Date of Birth (mm/dd/yyyy) as indicated and click Submit. You will be taken to the next sign-up page. 5. Create a CupomNow ID. This will be your CupomNow login ID and cannot be changed, so think of one that is secure and easy to remember. 6. Create a CupomNow password. You can change your password at any time. 7. Enter your Password Reset Question and Answer. This can be used at a later time if you forget your password. 8. Enter your e-mail address. You will receive e-mail notification when new information is available in [address] 9. Click Sign Up. You can now view and download portions of your medical record. 10. Click the Download Summary menu link to download a portable copy of your medical information. If you have questions, please visit the Frequently Asked Questions section of the CupomNow website. Remember, CupomNow is NOT to be used for urgent needs. For medical emergencies, dial 911. Now available from your iPhone and Android! Please provide this summary of care documentation to your next provider. Your primary care clinician is listed as Lynette Galvan. If you have any questions after today's visit, please call [phone].

## 2017-07-24 ENCOUNTER — TELEPHONE (OUTPATIENT)
Dept: ENDOCRINOLOGY | Age: 57
End: 2017-07-24

## 2017-07-24 NOTE — TELEPHONE ENCOUNTER
Left voicemail on his cell phone asking him to call me to discuss the blood sugar readings he dropped off on 7/7/17.

## 2017-09-27 ENCOUNTER — OFFICE VISIT (OUTPATIENT)
Dept: FAMILY MEDICINE CLINIC | Age: 57
End: 2017-09-27

## 2017-09-27 VITALS
BODY MASS INDEX: 27.1 KG/M2 | DIASTOLIC BLOOD PRESSURE: 69 MMHG | SYSTOLIC BLOOD PRESSURE: 145 MMHG | TEMPERATURE: 96.3 F | RESPIRATION RATE: 15 BRPM | WEIGHT: 178.8 LBS | HEART RATE: 60 BPM | HEIGHT: 68 IN | OXYGEN SATURATION: 99 %

## 2017-09-27 DIAGNOSIS — I10 ESSENTIAL HYPERTENSION, BENIGN: ICD-10-CM

## 2017-09-27 DIAGNOSIS — Z12.5 PROSTATE CANCER SCREENING: ICD-10-CM

## 2017-09-27 DIAGNOSIS — E78.00 HYPERCHOLESTEROLEMIA: ICD-10-CM

## 2017-09-27 DIAGNOSIS — E11.9 TYPE 2 DIABETES MELLITUS WITHOUT COMPLICATION, WITH LONG-TERM CURRENT USE OF INSULIN (HCC): Primary | ICD-10-CM

## 2017-09-27 DIAGNOSIS — Z79.4 TYPE 2 DIABETES MELLITUS WITHOUT COMPLICATION, WITH LONG-TERM CURRENT USE OF INSULIN (HCC): Primary | ICD-10-CM

## 2017-09-27 LAB — HBA1C MFR BLD HPLC: 8.5 %

## 2017-09-27 RX ORDER — HYDROCHLOROTHIAZIDE 12.5 MG/1
12.5 TABLET ORAL DAILY
Qty: 90 TAB | Refills: 3 | Status: SHIPPED | OUTPATIENT
Start: 2017-09-27 | End: 2018-10-15 | Stop reason: SDUPTHER

## 2017-09-27 NOTE — PROGRESS NOTES
HISTORY OF PRESENT ILLNESS  Ck Perez is a 60 y.o. male. HPI Pt. Comes in for blood pressure, cholesterol, and diabetes check. No complaints of chest pain, shortness of breath, TIAs, claudication or edema. Has been checking blood sugars, getting readings up to 300. No hypoglycemic episodes. ROS    Physical Exam   Constitutional: He appears well-developed and well-nourished. HENT:   Right Ear: External ear normal.   Left Ear: External ear normal.   Mouth/Throat: Oropharynx is clear and moist.   Neck: No thyromegaly present. Cardiovascular: Normal rate, regular rhythm, normal heart sounds and intact distal pulses. Pulmonary/Chest: Effort normal and breath sounds normal. No respiratory distress. He has no wheezes. Abdominal: Soft. Bowel sounds are normal. He exhibits no distension and no mass. There is no tenderness. There is no guarding. Musculoskeletal: Normal range of motion. He exhibits no edema. Lymphadenopathy:     He has no cervical adenopathy. Nursing note and vitals reviewed. ASSESSMENT and PLAN  Orders Placed This Encounter    CBC WITH AUTOMATED DIFF    METABOLIC PANEL, COMPREHENSIVE    LIPID PANEL    MICROALBUMIN, UR, RAND W/ MICROALBUMIN/CREA RATIO    PROSTATE SPECIFIC AG    AMB POC HEMOGLOBIN A1C    hydroCHLOROthiazide (HYDRODIURIL) 12.5 mg tablet     Diagnoses and all orders for this visit:    1. Type 2 diabetes mellitus without complication, with long-term current use of insulin ([facility])  -     AMB POC HEMOGLOBIN A1C  -     MICROALBUMIN, UR, RAND W/ MICROALBUMIN/CREA RATIO    2. Essential hypertension, benign  -     CBC WITH AUTOMATED DIFF  -     METABOLIC PANEL, COMPREHENSIVE    3. Hypercholesterolemia  -     LIPID PANEL    4. Prostate cancer screening  -     PROSTATE SPECIFIC AG    Other orders  -     hydroCHLOROthiazide (HYDRODIURIL) 12.5 mg tablet; Take 1 Tab by mouth daily.  For blood pressure      Follow-up Disposition:  Return in about 6 months (around 3/27/2018).

## 2017-09-27 NOTE — MR AVS SNAPSHOT
Visit Information Date & Time Provider Department Dept. Phone Encounter #  
 9/27/2017  3:45 PM Mandie Boyd MD [facility] [phone] [id number] Follow-up Instructions Return in about 6 months (around 3/27/2018). Your Appointments 12/4/2017  3:30 PM  
Follow Up with MD Gt Davison Diabetes and Endocrinology [facility]) Appt Note: 5m f/u  
 [address] [facility] [address] [id number] [address] [facility] Upcoming Health Maintenance Date Due  
 FOOT EXAM Q1 7/25/2017 EYE EXAM RETINAL OR DILATED Q1 11/30/2017 HEMOGLOBIN A1C Q6M 12/19/2017 MICROALBUMIN Q1 6/19/2018 LIPID PANEL Q1 6/19/2018 COLONOSCOPY 4/10/2019 Pneumococcal 19-64 Highest Risk (3 of 3 - PPSV23) 9/15/2021 DTaP/Tdap/Td series (2 - Td) 9/15/2026 Allergies as of 9/27/2017  Review Complete On: 9/27/2017 By: Mandie Boyd MD  
  
 Severity Noted Reaction Type Reactions Benazepril  09/21/2015    Other (comments)  
 hyperkalemia Current Immunizations  Reviewed on 1/18/2013 Name Date Influenza Vaccine 10/31/2013, 10/29/2012 Influenza Vaccine Split 9/30/2010 Influenza Vaccine Whole 11/28/2011 Pneumococcal Vaccine (Pcv) 10/30/2004 Not reviewed this visit You Were Diagnosed With   
  
 Codes Comments Type 2 diabetes mellitus without complication, with long-term current use of insulin ([facility])    -  Primary ICD-10-CM: E11.9, Z79.4 ICD-9-CM: 250.00, V58.67 Essential hypertension, benign     ICD-10-CM: I10 
ICD-9-CM: 401.1 Hypercholesterolemia     ICD-10-CM: E78.00 ICD-9-CM: 272.0 Prostate cancer screening     ICD-10-CM: Z12.5 ICD-9-CM: V76.44 Vitals BP Pulse Temp Resp Height(growth percentile) Weight(growth percentile) 145/69 60 96.3 °F (35.7 °C) (Oral) 15 5' 8\" (1.727 m) 178 lb 12.8 oz (81.1 kg) SpO2 BMI Smoking Status 99% 27.19 kg/m2 Never Smoker BMI and BSA Data Body Mass Index Body Surface Area  
 27.19 kg/m 2 1.97 m 2 Preferred Pharmacy Pharmacy Name Phone [address], [address] Liza Sutherland [phone] Your Updated Medication List  
  
   
This list is accurate as of: 9/27/17  4:21 PM.  Always use your most recent med list. amLODIPine 5 mg tablet Commonly known as:  Toshia Matar TAKE 2 TABLETS BY MOUTH DAILY  
  
 atorvastatin 80 mg tablet Commonly known as:  LIPITOR  
TAKE 1 TABLET BY MOUTH EVERY DAY  
  
 BD INSULIN SYRINGE ULTRA-FINE 1 mL 30 gauge x 1/2\" Syrg Generic drug:  Insulin Syringe-Needle U-100  
USE AS DIRECTED  
  
 doxazosin 2 mg tablet Commonly known as:  CARDURA Take 0.5 Tabs by mouth nightly. hydroCHLOROthiazide 12.5 mg tablet Commonly known as:  HYDRODIURIL Take 1 Tab by mouth daily. For blood pressure  
  
 insulin NPH/insulin regular 100 unit/mL (70-30) injection Commonly known as:  NovoLIN 70/30 INJECT 40 UNITS SUBCUTANEOUSLY IN THE MORNING AND 24 UNITS IN THE EVENING + 4 units for every 50 mg/dl above 150 mg/dl--Dose change 6/26/17--updated med list--did not send prescription to the pharmacy ONETOUCH ULTRA TEST strip Generic drug:  glucose blood VI test strips USE AS DIRECTED  
  
 tadalafil 20 mg tablet Commonly known as:  CIALIS  
TAKE 1 TABLET BY MOUTH 1 HOUR PRIOR TO SEX ON EMPTY STOMACH  
  
 TYLENOL 325 mg tablet Generic drug:  acetaminophen Take  by mouth every four (4) hours as needed for Pain. Prescriptions Sent to Pharmacy Refills  
 hydroCHLOROthiazide (HYDRODIURIL) 12.5 mg tablet 3 Sig: Take 1 Tab by mouth daily. For blood pressure Class: Normal  
 Pharmacy: [address] [place] [address] [address]  #: [phone] Route: Oral  
  
We Performed the Following AMB POC HEMOGLOBIN A1C [13401 CPT(R)] CBC WITH AUTOMATED DIFF [98653 CPT(R)] LIPID PANEL [98561 CPT(R)] METABOLIC PANEL, COMPREHENSIVE [86333 CPT(R)] MICROALBUMIN, UR, RAND W/ MICROALBUMIN/CREA RATIO [id number] CPT(R)] PSA, DIAGNOSTIC (PROSTATE SPECIFIC AG) [id number] CPT(R)] Follow-up Instructions Return in about 6 months (around 3/27/2018). Introducing [address]! [facility] introduces The [employer]imm patient portal. Now you can access parts of your medical record, email your doctor's office, and request medication refills online. 1. In your internet browser, go to [email or web address] 2. Click on the First Time User? Click Here link in the Sign In box. You will see the New Member Sign Up page. 3. Enter your The [employer]imm Access Code exactly as it appears below. You will not need to use this code after youve completed the sign-up process. If you do not sign up before the expiration date, you must request a new code. · The [employer] Access Code: [email or web address] Expires: 12/26/2017  4:21 PM 
 
4. Enter the last four digits of your Social Security Number (xxxx) and Date of Birth (mm/dd/yyyy) as indicated and click Submit. You will be taken to the next sign-up page. 5. Create a The [employer]imm ID. This will be your [employer] login ID and cannot be changed, so think of one that is secure and easy to remember. 6. Create a [employer]imm password. You can change your password at any time. 7. Enter your Password Reset Question and Answer. This can be used at a later time if you forget your password. 8. Enter your e-mail address. You will receive e-mail notification when new information is available in [address] 9. Click Sign Up. You can now view and download portions of your medical record. 10. Click the Download Summary menu link to download a portable copy of your medical information.  
 
If you have questions, please visit the Frequently Asked Questions section of the MocoSpace. Remember, [employer]GoPinhart is NOT to be used for urgent needs. For medical emergencies, dial 911. Now available from your iPhone and Android! Please provide this summary of care documentation to your next provider. Your primary care clinician is listed as Maureen Patel. If you have any questions after today's visit, please call [phone].

## 2017-09-27 NOTE — PROGRESS NOTES
Chief Complaint   Patient presents with    Hypertension    Diabetes     TYPE 1    Cholesterol Problem     1. Have you been to the ER, urgent care clinic since your last visit? Hospitalized since your last visit? No    2. Have you seen or consulted any other health care providers outside of the [facility] since your last visit? Include any pap smears or colon screening.  No   Health Maintenance Due   Topic Date Due    FOOT EXAM Q1  07/25/2017

## 2017-09-28 LAB
ALBUMIN SERPL-MCNC: 4.4 G/DL (ref 3.5–5.5)
ALBUMIN/CREAT UR: 99.4 MG/G CREAT (ref 0–30)
ALBUMIN/GLOB SERPL: 1.6 {RATIO} (ref 1.2–2.2)
ALP SERPL-CCNC: 103 IU/L (ref 39–117)
ALT SERPL-CCNC: 23 IU/L (ref 0–44)
AST SERPL-CCNC: 20 IU/L (ref 0–40)
BASOPHILS # BLD AUTO: 0 X10E3/UL (ref 0–0.2)
BASOPHILS NFR BLD AUTO: 0 %
BILIRUB SERPL-MCNC: 0.4 MG/DL (ref 0–1.2)
BUN SERPL-MCNC: 23 MG/DL (ref 6–24)
BUN/CREAT SERPL: 19 (ref 9–20)
CALCIUM SERPL-MCNC: 9.3 MG/DL (ref 8.7–10.2)
CHLORIDE SERPL-SCNC: 101 MMOL/L (ref 96–106)
CHOLEST SERPL-MCNC: 178 MG/DL (ref 100–199)
CO2 SERPL-SCNC: 24 MMOL/L (ref 18–29)
CREAT SERPL-MCNC: 1.22 MG/DL (ref 0.76–1.27)
CREAT UR-MCNC: 111.1 MG/DL
EOSINOPHIL # BLD AUTO: 0.1 X10E3/UL (ref 0–0.4)
EOSINOPHIL NFR BLD AUTO: 2 %
ERYTHROCYTE [DISTWIDTH] IN BLOOD BY AUTOMATED COUNT: 13.1 % (ref 12.3–15.4)
GLOBULIN SER CALC-MCNC: 2.7 G/DL (ref 1.5–4.5)
GLUCOSE SERPL-MCNC: 273 MG/DL (ref 65–99)
HCT VFR BLD AUTO: 40.6 % (ref 37.5–51)
HDLC SERPL-MCNC: 72 MG/DL
HGB BLD-MCNC: 12.9 G/DL (ref 12.6–17.7)
IMM GRANULOCYTES # BLD: 0 X10E3/UL (ref 0–0.1)
IMM GRANULOCYTES NFR BLD: 0 %
INTERPRETATION, 910389: NORMAL
LDLC SERPL CALC-MCNC: 94 MG/DL (ref 0–99)
LYMPHOCYTES # BLD AUTO: 1.4 X10E3/UL (ref 0.7–3.1)
LYMPHOCYTES NFR BLD AUTO: 22 %
Lab: NORMAL
MCH RBC QN AUTO: 30.4 PG (ref 26.6–33)
MCHC RBC AUTO-ENTMCNC: 31.8 G/DL (ref 31.5–35.7)
MCV RBC AUTO: 96 FL (ref 79–97)
MICROALBUMIN UR-MCNC: 110.4 UG/ML
MONOCYTES # BLD AUTO: 0.2 X10E3/UL (ref 0.1–0.9)
MONOCYTES NFR BLD AUTO: 4 %
NEUTROPHILS # BLD AUTO: 4.5 X10E3/UL (ref 1.4–7)
NEUTROPHILS NFR BLD AUTO: 72 %
PLATELET # BLD AUTO: 264 X10E3/UL (ref 150–379)
POTASSIUM SERPL-SCNC: 4.3 MMOL/L (ref 3.5–5.2)
PROT SERPL-MCNC: 7.1 G/DL (ref 6–8.5)
PSA SERPL-MCNC: 0.8 NG/ML (ref 0–4)
RBC # BLD AUTO: 4.24 X10E6/UL (ref 4.14–5.8)
SODIUM SERPL-SCNC: 140 MMOL/L (ref 134–144)
TRIGL SERPL-MCNC: 58 MG/DL (ref 0–149)
VLDLC SERPL CALC-MCNC: 12 MG/DL (ref 5–40)
WBC # BLD AUTO: 6.2 X10E3/UL (ref 3.4–10.8)

## 2017-11-02 ENCOUNTER — TELEPHONE (OUTPATIENT)
Dept: ENDOCRINOLOGY | Age: 57
End: 2017-11-02

## 2017-11-02 DIAGNOSIS — E78.00 HYPERCHOLESTEROLEMIA: ICD-10-CM

## 2017-11-02 DIAGNOSIS — E10.65 UNCONTROLLED TYPE 1 DIABETES MELLITUS WITH RETINOPATHY, MACULAR EDEMA PRESENCE UNSPECIFIED, UNSPECIFIED LATERALITY, UNSPECIFIED RETINOPATHY SEVERITY: ICD-10-CM

## 2017-11-02 DIAGNOSIS — I10 ESSENTIAL HYPERTENSION, BENIGN: ICD-10-CM

## 2017-11-02 DIAGNOSIS — E10.319 UNCONTROLLED TYPE 1 DIABETES MELLITUS WITH RETINOPATHY, MACULAR EDEMA PRESENCE UNSPECIFIED, UNSPECIFIED LATERALITY, UNSPECIFIED RETINOPATHY SEVERITY: ICD-10-CM

## 2017-11-02 NOTE — TELEPHONE ENCOUNTER
----- Message from Gt Skinner MD sent at 11/1/2017  6:41 AM EDT -----      ----- Message -----     From: Gt Skinner MD     Sent: 11/1/2017       To: Gt Skinner MD    Send a reminder letter to have labs prior to next visit    ----    completed

## 2017-11-02 NOTE — TELEPHONE ENCOUNTER
----- Message from Satinder Tirado MD sent at 11/1/2017  6:41 AM EDT -----      ----- Message -----     From: Satinder Tirado MD     Sent: 11/1/2017       To: Satinder Tirado MD    Send a reminder letter to have labs prior to next visit

## 2017-11-06 RX ORDER — HUMAN INSULIN 100 [IU]/ML
INJECTION, SUSPENSION SUBCUTANEOUS
Qty: 80 VIAL | Refills: 0 | Status: SHIPPED | OUTPATIENT
Start: 2017-11-06 | End: 2017-12-04 | Stop reason: SDUPTHER

## 2017-11-15 LAB
ALBUMIN SERPL-MCNC: 4.5 G/DL (ref 3.5–5.5)
ALBUMIN/CREAT UR: 109.5 MG/G CREAT (ref 0–30)
ALBUMIN/GLOB SERPL: 1.4 {RATIO} (ref 1.2–2.2)
ALP SERPL-CCNC: 97 IU/L (ref 39–117)
ALT SERPL-CCNC: 19 IU/L (ref 0–44)
AST SERPL-CCNC: 17 IU/L (ref 0–40)
BILIRUB SERPL-MCNC: 0.7 MG/DL (ref 0–1.2)
BUN SERPL-MCNC: 32 MG/DL (ref 6–24)
BUN/CREAT SERPL: 22 (ref 9–20)
CALCIUM SERPL-MCNC: 10 MG/DL (ref 8.7–10.2)
CHLORIDE SERPL-SCNC: 101 MMOL/L (ref 96–106)
CHOLEST SERPL-MCNC: 195 MG/DL (ref 100–199)
CO2 SERPL-SCNC: 25 MMOL/L (ref 18–29)
CREAT SERPL-MCNC: 1.48 MG/DL (ref 0.76–1.27)
CREAT UR-MCNC: 123 MG/DL
EST. AVERAGE GLUCOSE BLD GHB EST-MCNC: 192 MG/DL
GFR SERPLBLD CREATININE-BSD FMLA CKD-EPI: 51 ML/MIN/1.73
GFR SERPLBLD CREATININE-BSD FMLA CKD-EPI: 59 ML/MIN/1.73
GLOBULIN SER CALC-MCNC: 3.2 G/DL (ref 1.5–4.5)
GLUCOSE SERPL-MCNC: 179 MG/DL (ref 65–99)
HBA1C MFR BLD: 8.3 % (ref 4.8–5.6)
HDLC SERPL-MCNC: 70 MG/DL
INTERPRETATION, 910389: NORMAL
INTERPRETATION: NORMAL
LDLC SERPL CALC-MCNC: 112 MG/DL (ref 0–99)
MICROALBUMIN UR-MCNC: 134.7 UG/ML
PDF IMAGE, 910387: NORMAL
POTASSIUM SERPL-SCNC: 4.6 MMOL/L (ref 3.5–5.2)
PROT SERPL-MCNC: 7.7 G/DL (ref 6–8.5)
SODIUM SERPL-SCNC: 143 MMOL/L (ref 134–144)
TRIGL SERPL-MCNC: 66 MG/DL (ref 0–149)
VLDLC SERPL CALC-MCNC: 13 MG/DL (ref 5–40)

## 2017-12-04 ENCOUNTER — OFFICE VISIT (OUTPATIENT)
Dept: ENDOCRINOLOGY | Age: 57
End: 2017-12-04

## 2017-12-04 VITALS
WEIGHT: 193.6 LBS | SYSTOLIC BLOOD PRESSURE: 150 MMHG | BODY MASS INDEX: 29.34 KG/M2 | DIASTOLIC BLOOD PRESSURE: 80 MMHG | HEIGHT: 68 IN | HEART RATE: 55 BPM

## 2017-12-04 DIAGNOSIS — E10.319 UNCONTROLLED TYPE 1 DIABETES MELLITUS WITH RETINOPATHY, MACULAR EDEMA PRESENCE UNSPECIFIED, UNSPECIFIED LATERALITY, UNSPECIFIED RETINOPATHY SEVERITY: Primary | ICD-10-CM

## 2017-12-04 DIAGNOSIS — E10.65 UNCONTROLLED TYPE 1 DIABETES MELLITUS WITH RETINOPATHY, MACULAR EDEMA PRESENCE UNSPECIFIED, UNSPECIFIED LATERALITY, UNSPECIFIED RETINOPATHY SEVERITY: Primary | ICD-10-CM

## 2017-12-04 DIAGNOSIS — E78.00 HYPERCHOLESTEROLEMIA: ICD-10-CM

## 2017-12-04 DIAGNOSIS — I10 ESSENTIAL HYPERTENSION, BENIGN: ICD-10-CM

## 2017-12-04 RX ORDER — BENAZEPRIL HYDROCHLORIDE 20 MG/1
20 TABLET ORAL DAILY
Qty: 30 TAB | Refills: 11 | Status: SHIPPED | OUTPATIENT
Start: 2017-12-04 | End: 2018-10-15

## 2017-12-04 NOTE — PROGRESS NOTES
Chief Complaint   Patient presents with    Diabetes     pcp and pharmacy confirmed    Diabetic Foot Exam     due    Other     eye exam is due     History of Present Illness: Jean Marie Oliva is a 59 y.o. male here for follow up of diabetes. Weight up 12 lbs since last visit in 6/17. He still has not been following the scale I gave him from last visit and will take anywhere from 20-40 units for his breakfast and 10-30 units at dinner. This continues to lead to erratic control with some fasting values in the  range but most in the 220--400 range and then his dinner readings can be 100-400. Does have some lows in the 40-70 range when he takes more insulin and doesn't eat many carbs. Has not been taking the hctz everyday and usually it's every other day. Willing to check some BP readings at home. Compliant with lipitor. Current Outpatient Prescriptions   Medication Sig    NOVOLIN 70/30 100 unit/mL (70-30) injection INJECT 45 UNITS SUBCUTANEOUSLY IN THE MORNING AND 35 UNITS IN THE EVENING    hydroCHLOROthiazide (HYDRODIURIL) 12.5 mg tablet Take 1 Tab by mouth daily. For blood pressure    insulin NPH/insulin regular (NOVOLIN 70/30) 100 unit/mL (70-30) injection INJECT 40 UNITS SUBCUTANEOUSLY IN THE MORNING AND 24 UNITS IN THE EVENING + 4 units for every 50 mg/dl above 150 mg/dl--Dose change 6/26/17--updated med list--did not send prescription to the pharmacy    atorvastatin (LIPITOR) 80 mg tablet TAKE 1 TABLET BY MOUTH EVERY DAY    ONETOUCH ULTRA TEST strip USE AS DIRECTED    doxazosin (CARDURA) 2 mg tablet Take 0.5 Tabs by mouth nightly.  amLODIPine (NORVASC) 5 mg tablet TAKE 2 TABLETS BY MOUTH DAILY    BD INSULIN SYRINGE ULTRA-FINE 1 mL 30 gauge x 1/2\" syrg USE AS DIRECTED    tadalafil (CIALIS) 20 mg tablet TAKE 1 TABLET BY MOUTH 1 HOUR PRIOR TO SEX ON EMPTY STOMACH    acetaminophen (TYLENOL) 325 mg tablet Take  by mouth every four (4) hours as needed for Pain.      No current facility-administered medications for this visit. Allergies   Allergen Reactions    Benazepril Other (comments)     hyperkalemia     Review of Systems:  - Eyes: no blurry vision or double vision  - Cardiovascular: no chest pain  - Respiratory: no shortness of breath  - Musculoskeletal: no myalgias  - Neurological: no numbness/tingling in extremities    Physical Examination:  Blood pressure 150/80, pulse (!) 55, height 5' 8\" (1.727 m), weight 193 lb 9.6 oz (87.8 kg).   - General: pleasant, no distress, good eye contact   - Neck: no carotid bruits  - Cardiovascular: regular, normal rate, nl s1 and s2, no m/r/g,   - Respiratory: clear bilaterally  - Integumentary: no edema,   - Psychiatric: normal mood and affect         Diabetic foot exam performed by Maria Ines De Anda MD     Measurement  Response Nurse Comment Physician Comment   Monofilament  R - reduced sensation with micro filament  L - reduced sensation with micro filament     Pulse DP R - 2+ (normal)  L - 2+ (normal)     Vibration R - decreased  L - decreased     Structural deformity R - None  L - None     Skin Integrity / Deformity R - Mild - callus  L - Mild - callus        Reviewed by:                 Data Reviewed: Component      Latest Ref Rng & Units 11/14/2017 11/14/2017 11/14/2017 11/14/2017           3:48 PM  3:47 PM  3:46 PM  3:43 PM   Glucose      65 - 99 mg/dL  179 (H)     BUN      6 - 24 mg/dL  32 (H)     Creatinine      0.76 - 1.27 mg/dL  1.48 (H)     GFR est non-AA      >59 mL/min/1.73  51 (L)     GFR est AA      >59 mL/min/1.73  59 (L)     BUN/Creatinine ratio      9 - 20  22 (H)     Sodium      134 - 144 mmol/L  143     Potassium      3.5 - 5.2 mmol/L  4.6     Chloride      96 - 106 mmol/L  101     CO2      18 - 29 mmol/L  25     Calcium      8.7 - 10.2 mg/dL  10.0     Protein, total      6.0 - 8.5 g/dL  7.7     Albumin      3.5 - 5.5 g/dL  4.5     GLOBULIN, TOTAL      1.5 - 4.5 g/dL  3.2     A-G Ratio      1.2 - 2.2  1.4     Bilirubin, total      0.0 - 1.2 mg/dL  0.7     Alk. phosphatase      39 - 117 IU/L  97     AST      0 - 40 IU/L  17     ALT (SGPT)      0 - 44 IU/L  19     Cholesterol, total      100 - 199 mg/dL 195      Triglyceride      0 - 149 mg/dL 66      HDL Cholesterol      >39 mg/dL 70      VLDL, calculated      5 - 40 mg/dL 13      LDL, calculated      0 - 99 mg/dL 112 (H)      Creatinine, urine      Not Estab. mg/dL    123.0   Microalbumin, urine      Not Estab. ug/mL    134.7   Microalbumin/Creat. Ratio      0.0 - 30.0 mg/g creat    109.5 (H)   Hemoglobin A1c, (calculated)      4.8 - 5.6 %   8.3 (H)    Estimated average glucose      mg/dL   192        Assessment/Plan:     1. Type 1 diabetes, uncontrolled, with retinopathy ([facility] [address]): his most recent Hgb A1c was 8.3% in 11/17 down from 8.5% in 9/17 down from 9% in 6/17 up from 8.3% in 2/17 down from 9.5% in 10/16 stable from 7/16 down from 10% in 3/16. Control is improving but still not following scale as directed. Will make his breakfast scale less aggressive and told him he can go up or down by 4-8 units from the scale based on the amount of carbs he is eaing. May benefit from basal/bolus regimen in the future but has been on pre-mix the whole time so will continue this for now. - decrease 70/30 insulin to 30 units before breakfast and 24 units before dinner plus 2 units for every 50 mg/dl above 150 mg/dl   - check bs 3 times per day  - foot exam done 12/17  - microalbumin 106 in 3/16, down to 57 in 10/16 and stable in 6/17, up to 109 in 11/17--will add back 20 of benazepril but will be careful of hyperkalemia as had this in the past  - due for eye exam  - check Hgb A1c, cmp, and microalbumin prior to next visit      2. Essential hypertension, benign: his BP was above goal < 140/90 and given rise in microalbumin, will add back benazepril. If has any hypotension, will cut back on amlodipine.   Does have history of hyperaldosteronism that was surgically treated by left adrenalectomy in 3/12.  - cont amlodipine 5 mg 2 tabs daily  - cont doxazosin 2 mg 1/2 tab at bedtime  - cont hctz 12.5 mg daily  - begin benazepril 20 mg daily      3. Hypercholesterolemia: Given DM, Goal LDL < 100, non-HDL < 130, and TG < 150.  in 3/16. Up to 171 in 7/16 with non-compliance but down to 93 in 10/16 with compliance and 94 in 3/17 and 110 in 6/17 and 94 in 9/17 and 112 in 11/17  - cont lipitor 80 mg daily  - check lipids prior to next visit       We spent 40 minutes of face to face time together and > 50% of the time was spent in counseling regarding management of all the conditions above. Patient Instructions   1) Be sure to take the hctz 12.5 mg everyday along with the 1/2 tab of cardura (doxazosin) at bedtime and 2 tabs of amlodipine 5 mg everyday for blood pressure. If your reading is under 105 on the top number or under 55 on the bottom number, then let me know as we may need to cut back on medication. 3) Take your insulin 10-15 minutes BEFORE you eat breakfast and dinner and adjust the dose based on the scale below:  Blood sugar  Breakfast   Dinner          Less than 90  Eat first, take 36 units  Eat first, take 20     30 units   24     151-200  32 units   26   201-250  34 units   28  251-300  36 units   30   301-350  38 units   32   351-400  40 units   34  401-450  42 units   36  451-500  44 units   38  501-550  46 units   40  551-600  48 units   42  Over 600  50 units   44    4) If you are eating less carbs for dinner like salad and chicken or fish, plan on taking 6-8 units less than what is on the dinner scale to avoid lows overnight. As long as you are having some carbs with dinner (Pasta, bread, rice, potato), try the scale for dinner as listed above. If certain meals you find this is not enough, then take 4-8 more units for certain.       5) Your urine protein (Microalbumin) is running higher so you would benefit from adding back a low dose of benazepril to lower your blood pressure and protect your kidneys but we need to watch your potassium closely. The hctz helps keep your potassium down so that's why I need you to take one pill everyday to keep your blood pressure and potassium down. We will start back on 20 mg of benazepril once daily in the morning along with 12.5 mg of hctz in the morning. Start monitoring blood pressure about 2-3 times per week at alternating times either in the morning or evening after resting for 5 minutes and sitting upright in a chair with your arm at heart level. Please let me know if you are having readings over 140 on the top number or 90 on the bottom number after 2 weeks on the benazepril. Go have your kidney test and potassium repeated at [address] [place] [address] in 2 weeks around 12/18/17. 6) Drop off your readings on the day you go for your lab draw. The lab is in [facility] on the 1st floor. Follow-up Disposition:  Return in about 5 months (around 5/4/2018).     Copy sent to:  Dr. Arlin Colon via [email or web address]

## 2017-12-04 NOTE — PATIENT INSTRUCTIONS
1) Be sure to take the hctz 12.5 mg everyday along with the 1/2 tab of cardura (doxazosin) at bedtime and 2 tabs of amlodipine 5 mg everyday for blood pressure. If your reading is under 105 on the top number or under 55 on the bottom number, then let me know as we may need to cut back on medication. 3) Take your insulin 10-15 minutes BEFORE you eat breakfast and dinner and adjust the dose based on the scale below:  Blood sugar  Breakfast   Dinner          Less than 90  Eat first, take 36 units  Eat first, take 20     30 units   24     151-200  32 units   26   201-250  34 units   28  251-300  36 units   30   301-350  38 units   32   351-400  40 units   34  401-450  42 units   36  451-500  44 units   38  501-550  46 units   40  551-600  48 units   42  Over 600  50 units   44    4) If you are eating less carbs for dinner like salad and chicken or fish, plan on taking 6-8 units less than what is on the dinner scale to avoid lows overnight. As long as you are having some carbs with dinner (Pasta, bread, rice, potato), try the scale for dinner as listed above. If certain meals you find this is not enough, then take 4-8 more units for certain. 5) Your urine protein (Microalbumin) is running higher so you would benefit from adding back a low dose of benazepril to lower your blood pressure and protect your kidneys but we need to watch your potassium closely. The hctz helps keep your potassium down so that's why I need you to take one pill everyday to keep your blood pressure and potassium down. We will start back on 20 mg of benazepril once daily in the morning along with 12.5 mg of hctz in the morning. Start monitoring blood pressure about 2-3 times per week at alternating times either in the morning or evening after resting for 5 minutes and sitting upright in a chair with your arm at heart level.  Please let me know if you are having readings over 140 on the top number or 90 on the bottom number after 2 weeks on the benazepril. Go have your kidney test and potassium repeated at [facility] in 2 weeks around 12/18/17. 6) Drop off your readings on the day you go for your lab draw. The lab is in [facility] on the 1st floor.

## 2017-12-04 NOTE — MR AVS SNAPSHOT
Visit Information Date & Time Provider Department Dept. Phone Encounter #  
 12/4/2017  3:30 PM Jaylen Clemens, [address] [facility] End[facility]ology [phone] [id number] Follow-up Instructions Return in about 5 months (around 5/4/2018). Your Appointments 3/27/2018  3:30 PM  
ROUTINE CARE with Yessica Mireles MD  
[facility] [address]) Appt Note: routine follow up  
 [address] [facility] [id number] [phone] [address] [facility] [address] Upcoming Health Maintenance Date Due  
 FOOT EXAM Q1 7/25/2017 EYE EXAM RETINAL OR DILATED Q1 11/30/2017 HEMOGLOBIN A1C Q6M 5/14/2018 MICROALBUMIN Q1 11/14/2018 LIPID PANEL Q1 11/14/2018 COLONOSCOPY 4/10/2019 Pneumococcal 19-64 Highest Risk (3 of 3 - PPSV23) 9/15/2021 DTaP/Tdap/Td series (2 - Td) 9/15/2026 Allergies as of 12/4/2017  Review Complete On: 12/4/2017 By: Jaylen Clemens MD  
  
 Severity Noted Reaction Type Reactions Benazepril  09/21/2015    Other (comments)  
 hyperkalemia Current Immunizations  Reviewed on 1/18/2013 Name Date Influenza Vaccine 10/31/2013, 10/29/2012 Influenza Vaccine Split 9/30/2010 Influenza Vaccine Whole 11/28/2011 Pneumococcal Vaccine (Pcv) 10/30/2004 Not reviewed this visit You Were Diagnosed With   
  
 Codes Comments Uncontrolled type 1 diabetes mellitus with retinopathy, macular edema presence unspecified, unspecified laterality, unspecified retinopathy severity ([facility] [address]    -  Primary ICD-10-CM: E10.319, E10.65 ICD-9-CM: 250.53, 362.01 Vitals BP Pulse Height(growth percentile) Weight(growth percentile) BMI Smoking Status 150/80 (!) 55 5' 8\" (1.727 m) 193 lb 9.6 oz (87.8 kg) 29.44 kg/m2 Never Smoker Vitals History BMI and BSA Data Body Mass Index Body Surface Area  
 29.44 kg/m 2 2.05 m 2 Preferred Pharmacy Pharmacy Name Phone [facility]/PHARMACY #[id number] Rodriguez, [address] [place] [address] [phone] Your Updated Medication List  
  
   
This list is accurate as of: 12/4/17  5:20 PM.  Always use your most recent med list. amLODIPine 5 mg tablet Commonly known as:  David Alstrom TAKE 2 TABLETS BY MOUTH DAILY  
  
 atorvastatin 80 mg tablet Commonly known as:  LIPITOR  
TAKE 1 TABLET BY MOUTH EVERY DAY  
  
 BD INSULIN SYRINGE ULTRA-FINE 1 mL 30 gauge x 1/2\" Syrg Generic drug:  Insulin Syringe-Needle U-100  
USE AS DIRECTED  
  
 benazepril 20 mg tablet Commonly known as:  LOTENSIN Take 1 Tab by mouth daily. doxazosin 2 mg tablet Commonly known as:  CARDURA Take 0.5 Tabs by mouth nightly. hydroCHLOROthiazide 12.5 mg tablet Commonly known as:  HYDRODIURIL Take 1 Tab by mouth daily. For blood pressure  
  
 insulin NPH/insulin regular 100 unit/mL (70-30) injection Commonly known as:  NovoLIN 70/30 INJECT 30 UNITS SUBCUTANEOUSLY IN THE MORNING AND 24 UNITS IN THE EVENING + 2 units for every 50 mg/dl above 150 mg/dl--Dose change 12/4/17--updated med list--did not send prescription to the pharmacy ONETOUCH ULTRA TEST strip Generic drug:  glucose blood VI test strips USE AS DIRECTED  
  
 tadalafil 20 mg tablet Commonly known as:  CIALIS  
TAKE 1 TABLET BY MOUTH 1 HOUR PRIOR TO SEX ON EMPTY STOMACH  
  
 TYLENOL 325 mg tablet Generic drug:  acetaminophen Take  by mouth every four (4) hours as needed for Pain. Prescriptions Sent to Pharmacy Refills  
 benazepril (LOTENSIN) 20 mg tablet 11 Sig: Take 1 Tab by mouth daily. Class: Normal  
 Pharmacy: [facility]/pharmacy #[id number] Ph #: [phone] Route: Oral  
  
We Performed the Following  DIABETES FOOT EXAM [[facility]] Follow-up Instructions Return in about 5 months (around 5/4/2018). Patient Instructions 1) Be sure to take the hctz 12.5 mg everyday along with the 1/2 tab of cardura (doxazosin) at bedtime and 2 tabs of amlodipine 5 mg everyday for blood pressure. If your reading is under 105 on the top number or under 55 on the bottom number, then let me know as we may need to cut back on medication. 3) Take your insulin 10-15 minutes BEFORE you eat breakfast and dinner and adjust the dose based on the scale below: 
Blood sugar  Breakfast   Dinner Less than 90  Eat first, take 36 units  Eat first, take 20 
   30 units   24    
151-200  32 units   26  
201-250  34 units   28 
251-300  36 units   30  
301-350  38 units   32  
351-400  40 units   34 
401-450  42 units   36 
451-500  44 units   38 
501-550  46 units   40 
551-600  48 units   42 Over 600  50 units   44 
 
4) If you are eating less carbs for dinner like salad and chicken or fish, plan on taking 6-8 units less than what is on the dinner scale to avoid lows overnight. As long as you are having some carbs with dinner (Pasta, bread, rice, potato), try the scale for dinner as listed above. If certain meals you find this is not enough, then take 4-8 more units for certain. 5) Your urine protein (Microalbumin) is running higher so you would benefit from adding back a low dose of benazepril to lower your blood pressure and protect your kidneys but we need to watch your potassium closely. The hctz helps keep your potassium down so that's why I need you to take one pill everyday to keep your blood pressure and potassium down. We will start back on 20 mg of benazepril once daily in the morning along with 12.5 mg of hctz in the morning. Start monitoring blood pressure about 2-3 times per week at alternating times either in the morning or evening after resting for 5 minutes and sitting upright in a chair with your arm at heart level.  Please let me know if you are having readings over 140 on the top number or 90 on the bottom number after 2 weeks on the benazepril. Go have your kidney test and potassium repeated at [facility] in 2 weeks around 12/18/17. 6) Drop off your readings on the day you go for your lab draw. The lab is in [facility] on the 1st floor. Introducing [facility] SERVICES! Luz Riley introduces [employer] patient portal. Now you can access parts of your medical record, email your doctor's office, and request medication refills online. 1. In your internet browser, go to [email or web address] 2. Click on the First Time User? Click Here link in the Sign In box. You will see the New Member Sign Up page. 3. Enter your [employer] Access Code exactly as it appears below. You will not need to use this code after youve completed the sign-up process. If you do not sign up before the expiration date, you must request a new code. · [employer] Access Code: [email or web address] Expires: 12/26/2017  3:21 PM 
 
4. Enter the last four digits of your Social Security Number (xxxx) and Date of Birth (mm/dd/yyyy) as indicated and click Submit. You will be taken to the next sign-up page. 5. Create a [employer]ipp ID. This will be your [employer]ipp login ID and cannot be changed, so think of one that is secure and easy to remember. 6. Create a [employer]ipp password. You can change your password at any time. 7. Enter your Password Reset Question and Answer. This can be used at a later time if you forget your password. 8. Enter your e-mail address. You will receive e-mail notification when new information is available in [address] 9. Click Sign Up. You can now view and download portions of your medical record. 10. Click the Download Summary menu link to download a portable copy of your medical information. If you have questions, please visit the Frequently Asked Questions section of the [employer] website.  Remember, [employer] is NOT to be used for urgent needs. For medical emergencies, dial 911. Now available from your iPhone and Android! Please provide this summary of care documentation to your next provider. Your primary care clinician is listed as Rachael Camargo. If you have any questions after today's visit, please call [phone].

## 2017-12-19 LAB
BUN SERPL-MCNC: 30 MG/DL (ref 6–24)
BUN/CREAT SERPL: 19 (ref 9–20)
CALCIUM SERPL-MCNC: 9.4 MG/DL (ref 8.7–10.2)
CHLORIDE SERPL-SCNC: 97 MMOL/L (ref 96–106)
CO2 SERPL-SCNC: 25 MMOL/L (ref 18–29)
CREAT SERPL-MCNC: 1.54 MG/DL (ref 0.76–1.27)
GFR SERPLBLD CREATININE-BSD FMLA CKD-EPI: 49 ML/MIN/1.73
GFR SERPLBLD CREATININE-BSD FMLA CKD-EPI: 56 ML/MIN/1.73
GLUCOSE SERPL-MCNC: 329 MG/DL (ref 65–99)
INTERPRETATION: NORMAL
POTASSIUM SERPL-SCNC: 5 MMOL/L (ref 3.5–5.2)
SODIUM SERPL-SCNC: 138 MMOL/L (ref 134–144)

## 2018-02-20 NOTE — TELEPHONE ENCOUNTER
----- Message from Alida Morales sent at 2/19/2018  4:07 PM EST -----  Regarding: /Refill  Pt called to advise he will need an alternative for his \"nobolin\" 70/30. [facility] will not pay for it and request a switch to \"humilog\" products    Mail Order Pharmacy through [facility]. Pt doesn't have contact information for them said we have it on file.     Best contact for the pt is  [phone]

## 2018-03-09 ENCOUNTER — APPOINTMENT (OUTPATIENT)
Dept: GENERAL RADIOLOGY | Age: 58
End: 2018-03-09
Attending: EMERGENCY MEDICINE
Payer: COMMERCIAL

## 2018-03-09 ENCOUNTER — HOSPITAL ENCOUNTER (EMERGENCY)
Age: 58
Discharge: HOME | End: 2018-03-09
Attending: EMERGENCY MEDICINE | Admitting: EMERGENCY MEDICINE
Payer: COMMERCIAL

## 2018-03-09 VITALS
WEIGHT: 183.42 LBS | HEART RATE: 72 BPM | TEMPERATURE: 99.3 F | SYSTOLIC BLOOD PRESSURE: 138 MMHG | RESPIRATION RATE: 18 BRPM | BODY MASS INDEX: 27.8 KG/M2 | OXYGEN SATURATION: 100 % | DIASTOLIC BLOOD PRESSURE: 69 MMHG | HEIGHT: 68 IN

## 2018-03-09 DIAGNOSIS — L03.90 CELLULITIS, UNSPECIFIED CELLULITIS SITE: ICD-10-CM

## 2018-03-09 DIAGNOSIS — B35.3 TINEA PEDIS, UNSPECIFIED LATERALITY: Primary | ICD-10-CM

## 2018-03-09 LAB
ALBUMIN SERPL-MCNC: 3.4 G/DL (ref 3.5–5)
ALBUMIN/GLOB SERPL: 0.8 {RATIO} (ref 1.1–2.2)
ALP SERPL-CCNC: 117 U/L (ref 45–117)
ALT SERPL-CCNC: 23 U/L (ref 12–78)
ANION GAP SERPL CALC-SCNC: 6 MMOL/L (ref 5–15)
AST SERPL-CCNC: 12 U/L (ref 15–37)
BASOPHILS # BLD: 0 K/UL (ref 0–0.1)
BASOPHILS NFR BLD: 0 % (ref 0–1)
BILIRUB SERPL-MCNC: 0.9 MG/DL (ref 0.2–1)
BUN SERPL-MCNC: 30 MG/DL (ref 6–20)
BUN/CREAT SERPL: 18 (ref 12–20)
CALCIUM SERPL-MCNC: 8.8 MG/DL (ref 8.5–10.1)
CHLORIDE SERPL-SCNC: 104 MMOL/L (ref 97–108)
CO2 SERPL-SCNC: 28 MMOL/L (ref 21–32)
CREAT SERPL-MCNC: 1.64 MG/DL (ref 0.7–1.3)
DIFFERENTIAL METHOD BLD: ABNORMAL
EOSINOPHIL # BLD: 0.1 K/UL (ref 0–0.4)
EOSINOPHIL NFR BLD: 1 % (ref 0–7)
ERYTHROCYTE [DISTWIDTH] IN BLOOD BY AUTOMATED COUNT: 11.9 % (ref 11.5–14.5)
GLOBULIN SER CALC-MCNC: 4.5 G/DL (ref 2–4)
GLUCOSE SERPL-MCNC: 320 MG/DL (ref 65–100)
HCT VFR BLD AUTO: 38.3 % (ref 36.6–50.3)
HGB BLD-MCNC: 12.4 G/DL (ref 12.1–17)
IMM GRANULOCYTES # BLD: 0 K/UL (ref 0–0.04)
IMM GRANULOCYTES NFR BLD AUTO: 0 % (ref 0–0.5)
LYMPHOCYTES # BLD: 1 K/UL (ref 0.8–3.5)
LYMPHOCYTES NFR BLD: 11 % (ref 12–49)
MCH RBC QN AUTO: 30.8 PG (ref 26–34)
MCHC RBC AUTO-ENTMCNC: 32.4 G/DL (ref 30–36.5)
MCV RBC AUTO: 95.3 FL (ref 80–99)
MONOCYTES # BLD: 0.6 K/UL (ref 0–1)
MONOCYTES NFR BLD: 7 % (ref 5–13)
NEUTS SEG # BLD: 7.7 K/UL (ref 1.8–8)
NEUTS SEG NFR BLD: 82 % (ref 32–75)
NRBC # BLD: 0 K/UL (ref 0–0.01)
NRBC BLD-RTO: 0 PER 100 WBC
PLATELET # BLD AUTO: 238 K/UL (ref 150–400)
PMV BLD AUTO: 10.3 FL (ref 8.9–12.9)
POTASSIUM SERPL-SCNC: 4.4 MMOL/L (ref 3.5–5.1)
PROT SERPL-MCNC: 7.9 G/DL (ref 6.4–8.2)
RBC # BLD AUTO: 4.02 M/UL (ref 4.1–5.7)
SODIUM SERPL-SCNC: 138 MMOL/L (ref 136–145)
WBC # BLD AUTO: 9.4 K/UL (ref 4.1–11.1)

## 2018-03-09 PROCEDURE — 36415 COLL VENOUS BLD VENIPUNCTURE: CPT | Performed by: PHYSICIAN ASSISTANT

## 2018-03-09 PROCEDURE — 99281 EMR DPT VST MAYX REQ PHY/QHP: CPT

## 2018-03-09 PROCEDURE — 85025 COMPLETE CBC W/AUTO DIFF WBC: CPT | Performed by: PHYSICIAN ASSISTANT

## 2018-03-09 PROCEDURE — 73630 X-RAY EXAM OF FOOT: CPT

## 2018-03-09 PROCEDURE — 80053 COMPREHEN METABOLIC PANEL: CPT | Performed by: PHYSICIAN ASSISTANT

## 2018-03-09 RX ORDER — CEPHALEXIN 500 MG/1
500 CAPSULE ORAL 4 TIMES DAILY
Qty: 28 CAP | Refills: 0 | Status: SHIPPED | OUTPATIENT
Start: 2018-03-09 | End: 2018-03-11

## 2018-03-09 RX ORDER — SODIUM CHLORIDE 0.9 % (FLUSH) 0.9 %
5-10 SYRINGE (ML) INJECTION EVERY 8 HOURS
Status: DISCONTINUED | OUTPATIENT
Start: 2018-03-09 | End: 2018-03-09 | Stop reason: HOSPADM

## 2018-03-09 RX ORDER — SODIUM CHLORIDE 0.9 % (FLUSH) 0.9 %
5-10 SYRINGE (ML) INJECTION AS NEEDED
Status: DISCONTINUED | OUTPATIENT
Start: 2018-03-09 | End: 2018-03-09 | Stop reason: HOSPADM

## 2018-03-09 RX ORDER — CHLORPHENIRAMINE MALEATE 4 MG
TABLET ORAL 2 TIMES DAILY
Qty: 1 TUBE | Refills: 0 | Status: SHIPPED | OUTPATIENT
Start: 2018-03-09 | End: 2018-04-08

## 2018-03-09 NOTE — ED PROVIDER NOTES
EMERGENCY DEPARTMENT HISTORY AND PHYSICAL EXAM      Date: 3/9/2018  Patient Name: Madelaine Espinosa    History of Presenting Illness     Chief Complaint   Patient presents with    Foot Pain     c/o daibetic right foot ulcer x  two weeks     I have seen and evaluated this patient in the Express Care portion of triage for a diabetic foot ulcer. The patient's care will begin now and orders have been placed. This patient will be seen and provided further care in the Emergency Room. Written by Cyrus Howe, a scribe for [address] [place] [address]. LUIS Norwood. History Provided By: Patient    HPI: Madelaine Espinosa, 59 y.o. male with PMHx significant for DM, DKA, HTN, HLD, and CKD stage 3 due to type 2 DM, presents ambulatory to the ED with cc of constant painful ulcer to his 3rd R toe for the past 3 weeks and a more recent ulcer to his 2nd R toe. He rates current ulcer pain 10/10, describes as achy, and denies modifying factors. Pt denies taking medication for pain. He denies associated symptoms. Pt reports hx of DM. He specifically denies any fevers, chills, nausea, vomiting, chest pain, shortness of breath, headache, rash, diarrhea, sweating or weight loss. PCP: Ari Quinonez MD    There are no other complaints, changes, or physical findings at this time. Current Outpatient Prescriptions   Medication Sig Dispense Refill    insulin lispro protamine/insulin lispro (HUMALOG MIX 75/25) 100 unit/mL (75-25) injection Take 30units in and 24units in pm +2 units above 150 1 Vial 12    benazepril (LOTENSIN) 20 mg tablet Take 1 Tab by mouth daily. 30 Tab 11    insulin NPH/insulin regular (NOVOLIN 70/30) 100 unit/mL (70-30) injection INJECT 30 UNITS SUBCUTANEOUSLY IN THE MORNING AND 24 UNITS IN THE EVENING + 2 units for every 50 mg/dl above 150 mg/dl--Dose change 12/4/17--updated med list--did not send prescription to the pharmacy 80 Vial 0    hydroCHLOROthiazide (HYDRODIURIL) 12.5 mg tablet Take 1 Tab by mouth daily.  For blood pressure 90 Tab 3    atorvastatin (LIPITOR) 80 mg tablet TAKE 1 TABLET BY MOUTH EVERY DAY 90 Tab 1    ONETOUCH ULTRA TEST strip USE AS DIRECTED 300 Strip 3    doxazosin (CARDURA) 2 mg tablet Take 0.5 Tabs by mouth nightly. 45 Tab 3    amLODIPine (NORVASC) 5 mg tablet TAKE 2 TABLETS BY MOUTH DAILY 120 Tab 12    BD INSULIN SYRINGE ULTRA-FINE 1 mL 30 gauge x 1/2\" syrg USE AS DIRECTED 100 Syringe 12    tadalafil (CIALIS) 20 mg tablet TAKE 1 TABLET BY MOUTH 1 HOUR PRIOR TO SEX ON EMPTY STOMACH 5 Tab 6    acetaminophen (TYLENOL) 325 mg tablet Take  by mouth every four (4) hours as needed for Pain. Past History     Past Medical History:  Past Medical History:   Diagnosis Date    ARF (acute renal failure) ([address] 1/11/2013    Arthritis     knees    CKD stage 3 due to type 2 diabetes mellitus [facility]     Diabetes ([facility] 1982    Type 1    DKA (diabetic ketoacidoses) ([facility]     Hyperaldosteronism ([facility]     s/p surgery    Hypercholesterolemia 11/30/2010    Hypertension 11/30/2010    Ventricular bigeminy 6/29/2011       Past Surgical History:  Past Surgical History:   Procedure Laterality Date    COLONOSCOPY  4-09    kat- normal    ENDOSCOPY, COLON, DIAGNOSTIC  2009    Dr Lacey Tobin [facility]  [id number]    left adenoma removed for hyperaldosteronism    HX BUNIONECTOMY Right     HX CATARACT REMOVAL Right     HX HEENT      laser surgery left eye       Family History:  Family History   Problem Relation Age of Onset    Hypertension Father     Diabetes Father      Type 2    Diabetes Brother      Type 2       Social History:  Social History   Substance Use Topics    Smoking status: Never Smoker    Smokeless tobacco: Never Used    Alcohol use 0.0 oz/week     0 Standard drinks or equivalent per week      Comment: may have beer once a week or mixed drink 2 times a month       Allergies:   Allergies   Allergen Reactions    Benazepril Other (comments)     hyperkalemia         Review of Systems   Review of Systems   Constitutional: Negative. Negative for activity change, appetite change, chills, fatigue, fever and unexpected weight change. HENT: Negative. Negative for congestion, hearing loss, rhinorrhea, sneezing and voice change. Eyes: Negative. Negative for pain and visual disturbance. Respiratory: Negative. Negative for apnea, cough, choking, chest tightness and shortness of breath. Cardiovascular: Negative. Negative for chest pain and palpitations. Gastrointestinal: Negative. Negative for abdominal distention, abdominal pain, blood in stool, diarrhea, nausea and vomiting. Genitourinary: Negative. Negative for difficulty urinating, flank pain, frequency and urgency. No discharge   Musculoskeletal: Negative. Negative for arthralgias, back pain, myalgias and neck stiffness. Skin: Negative for color change. + ulcer to 2nd and 3rd R toes   Neurological: Negative. Negative for dizziness, seizures, syncope, speech difficulty, weakness, numbness and headaches. Hematological: Negative for adenopathy. Psychiatric/Behavioral: Negative. Negative for agitation, behavioral problems, dysphoric mood and suicidal ideas. The patient is not nervous/anxious. All other systems reviewed and are negative. Physical Exam   Physical Exam   Constitutional: He is oriented to person, place, and time. He appears well-developed and well-nourished. No distress. HENT:   Head: Normocephalic and atraumatic. Mouth/Throat: Oropharynx is clear and moist. No oropharyngeal exudate. Eyes: Conjunctivae and EOM are normal. Pupils are equal, round, and reactive to light. Right eye exhibits no discharge. Left eye exhibits no discharge. Neck: Normal range of motion. Neck supple. Cardiovascular: Normal rate, regular rhythm and intact distal pulses. Exam reveals no gallop and no friction rub. No murmur heard.   Pulmonary/Chest: Effort normal and breath sounds normal. No respiratory distress. He has no wheezes. He has no rales. He exhibits no tenderness. Abdominal: Soft. Bowel sounds are normal. He exhibits no distension and no mass. There is no tenderness. There is no rebound and no guarding. Musculoskeletal: Normal range of motion. He exhibits no edema. Lymphadenopathy:     He has no cervical adenopathy. Neurological: He is alert and oriented to person, place, and time. No cranial nerve deficit. Coordination normal.   Skin: Skin is warm and dry. Ulcer between the 2nd and 3rd toe of the R foot   Psychiatric: He has a normal mood and affect. Nursing note and vitals reviewed. Diagnostic Study Results     Labs -     Recent Results (from the past 12 hour(s))   CBC WITH AUTOMATED DIFF    Collection Time: 03/09/18  8:34 AM   Result Value Ref Range    WBC 9.4 4.1 - 11.1 K/uL    RBC 4.02 (L) 4.10 - 5.70 M/uL    HGB 12.4 12.1 - 17.0 g/dL    HCT 38.3 36.6 - 50.3 %    MCV 95.3 80.0 - 99.0 FL    MCH 30.8 26.0 - 34.0 PG    MCHC 32.4 30.0 - 36.5 g/dL    RDW 11.9 11.5 - 14.5 %    PLATELET 691 908 - 485 K/uL    MPV 10.3 8.9 - 12.9 FL    NRBC 0.0 0  WBC    ABSOLUTE NRBC 0.00 0.00 - 0.01 K/uL    NEUTROPHILS 82 (H) 32 - 75 %    LYMPHOCYTES 11 (L) 12 - 49 %    MONOCYTES 7 5 - 13 %    EOSINOPHILS 1 0 - 7 %    BASOPHILS 0 0 - 1 %    IMMATURE GRANULOCYTES 0 0.0 - 0.5 %    ABS. NEUTROPHILS 7.7 1.8 - 8.0 K/UL    ABS. LYMPHOCYTES 1.0 0.8 - 3.5 K/UL    ABS. MONOCYTES 0.6 0.0 - 1.0 K/UL    ABS. EOSINOPHILS 0.1 0.0 - 0.4 K/UL    ABS. BASOPHILS 0.0 0.0 - 0.1 K/UL    ABS. IMM.  GRANS. 0.0 0.00 - 0.04 K/UL    DF AUTOMATED     METABOLIC PANEL, COMPREHENSIVE    Collection Time: 03/09/18  8:34 AM   Result Value Ref Range    Sodium 138 136 - 145 mmol/L    Potassium 4.4 3.5 - 5.1 mmol/L    Chloride 104 97 - 108 mmol/L    CO2 28 21 - 32 mmol/L    Anion gap 6 5 - 15 mmol/L    Glucose 320 (H) 65 - 100 mg/dL    BUN 30 (H) 6 - 20 MG/DL    Creatinine 1.64 (H) 0.70 - 1.30 MG/DL    BUN/Creatinine ratio 18 12 - 20      GFR est AA 52 (L) >60 ml/min/1.73m2    GFR est non-AA 43 (L) >60 ml/min/1.73m2    Calcium 8.8 8.5 - 10.1 MG/DL    Bilirubin, total 0.9 0.2 - 1.0 MG/DL    ALT (SGPT) 23 12 - 78 U/L    AST (SGOT) 12 (L) 15 - 37 U/L    Alk. phosphatase 117 45 - 117 U/L    Protein, total 7.9 6.4 - 8.2 g/dL    Albumin 3.4 (L) 3.5 - 5.0 g/dL    Globulin 4.5 (H) 2.0 - 4.0 g/dL    A-G Ratio 0.8 (L) 1.1 - 2.2         Radiologic Studies -   EXAM:  XR FOOT RT MIN 3 V     INDICATION:  Diabetic right foot ulcer for 2 weeks in the first interspace. Evaluation for osteomyelitis of the second metatarsal.     COMPARISON:  None.     FINDINGS:  Three views of the right foot demonstrate 2 pins through well healed  first metatarsal osteotomy. A third pin extends through well-healed first  proximal phalanx. There are some corticated erosive changes at the IP joint. No  destructive changes noted to suggest osteomyelitis of the first or second  metatarsal. Mild forefoot soft tissue swelling is present. Vascular  calcification is noted.      IMPRESSION  IMPRESSION: Well-healed postsurgical changes of the first ray. No plain film  evidence for osteomyelitis at this time. Plain film findings can lag 7-10 days  behind clinical infection, and clinical correlation is needed.                    Medical Decision Making   I am the first provider for this patient. I reviewed the vital signs, available nursing notes, past medical history, past surgical history, family history and social history. Vital Signs-Reviewed the patient's vital signs. Patient Vitals for the past 12 hrs:   Temp Pulse Resp BP SpO2   03/09/18 0734 99.3 °F (37.4 °C) 72 18 138/69 100 %       Records Reviewed: Nursing Notes and Old Medical Records    Provider Notes (Medical Decision Making):   DDx: cellulitis, osteomyelitis, tinea pedis. ED Course:   Initial assessment performed.  The patients presenting problems have been discussed, and they are in agreement with the care plan formulated and outlined with them. I have encouraged them to ask questions as they arise throughout their visit. Disposition:    DISCHARGE NOTE  9:19 AM  The patient has been re-evaluated and is ready for discharge. Reviewed available results with patient. Counseled pt on diagnosis and care plan. Pt has expressed understanding, and all questions have been answered. Pt agrees with plan and agrees to F/U as recommended, or return to the ED if their sxs worsen. Discharge instructions have been provided and explained to the pt, along with reasons to return to the ED. Written by Manuel Sevilla, ED Scribe, as dictated by Michelle Oshea. Jennifer Pereira MD.    PLAN:  1. Current Discharge Medication List      START taking these medications    Details   cephALEXin (KEFLEX) 500 mg capsule Take 1 Cap by mouth four (4) times daily for 7 days. Qty: 28 Cap, Refills: 0      clotrimazole (LOTRIMIN) 1 % topical cream Apply  to affected area two (2) times a day for 30 days. Apply twice a day for 2-4 weeks  Qty: 1 Tube, Refills: 0           2. Follow-up Information     Follow up With Details Comments Contact Info    Tabitha Pacheco MD Call in 2 days As needed, If symptoms worsen [place] [id number]  [phone]          Return to ED if worse     Diagnosis     Clinical Impression:   1. Tinea pedis, unspecified laterality    2. Cellulitis, unspecified cellulitis site        Attestations: This note is prepared by Manuel Sevilla, acting as Scribe for Michelle Oshea. Jennifer Pereira, [facility] Jennifer Pereira MD: The scribe's documentation has been prepared under my direction and personally reviewed by me in its entirety. I confirm that the note above accurately reflects all work, treatment, procedures, and medical decision making performed by me.

## 2018-03-09 NOTE — DISCHARGE INSTRUCTIONS
Cellulitis: Care Instructions  Your Care Instructions    Cellulitis is a skin infection. It often occurs after a break in the skin from a scrape, cut, bite, or puncture, or after a rash. The doctor has checked you carefully, but problems can develop later. If you notice any problems or new symptoms, get medical treatment right away. Follow-up care is a key part of your treatment and safety. Be sure to make and go to all appointments, and call your doctor if you are having problems. It's also a good idea to know your test results and keep a list of the medicines you take. How can you care for yourself at home? · Take your antibiotics as directed. Do not stop taking them just because you feel better. You need to take the full course of antibiotics. · Prop up the infected area on pillows to reduce pain and swelling. Try to keep the area above the level of your heart as often as you can. · If your doctor told you how to care for your wound, follow your doctor's instructions. If you did not get instructions, follow this general advice:  ¨ Wash the wound with clean water 2 times a day. Don't use hydrogen peroxide or alcohol, which can slow healing. ¨ You may cover the wound with a thin layer of petroleum jelly, such as Vaseline, and a nonstick bandage. ¨ Apply more petroleum jelly and replace the bandage as needed. · Be safe with medicines. Take pain medicines exactly as directed. ¨ If the doctor gave you a prescription medicine for pain, take it as prescribed. ¨ If you are not taking a prescription pain medicine, ask your doctor if you can take an over-the-counter medicine. To prevent cellulitis in the future  · Try to prevent cuts, scrapes, or other injuries to your skin. Cellulitis most often occurs where there is a break in the skin. · If you get a scrape, cut, mild burn, or bite, wash the wound with clean water as soon as you can to help avoid infection.  Don't use hydrogen peroxide or alcohol, which can slow healing. · If you have swelling in your legs (edema), support stockings and good skin care may help prevent leg sores and cellulitis. · Take care of your feet, especially if you have diabetes or other conditions that increase the risk of infection. Wear shoes and socks. Do not go barefoot. If you have athlete's foot or other skin problems on your feet, talk to your doctor about how to treat them. When should you call for help? Call your doctor now or seek immediate medical care if:  ? · You have signs that your infection is getting worse, such as:  ¨ Increased pain, swelling, warmth, or redness. ¨ Red streaks leading from the area. ¨ Pus draining from the area. ¨ A fever. ? · You get a rash. ? Watch closely for changes in your health, and be sure to contact your doctor if:  ? · You are not getting better after 1 day (24 hours). ? · You do not get better as expected. Where can you learn more? Go to http://nahomi-carlee.info/. Rossi Dodson in the search box to learn more about \"Cellulitis: Care Instructions. \"  Current as of: October 13, 2016  Content Version: 11.4  © 7048-6004 [employer]. Care instructions adapted under license by [employer] (which disclaims liability or warranty for this information). If you have questions about a medical condition or this instruction, always ask your healthcare professional. [place] [address] any warranty or liability for your use of this information. Athlete's Foot: Care Instructions  Your Care Instructions    Athlete's foot is an itchy rash on the foot caused by an infection with a fungus. You can get it by going barefoot in wet public areas, such as swimming pools or locker rooms. Many times there is no clear reason why you get athlete's foot. You can easily treat athlete's foot by putting medicine on your feet for 1 to 6 weeks.  In some cases, a doctor may prescribe pills to kill the fungus. Follow-up care is a key part of your treatment and safety. Be sure to make and go to all appointments, and call your doctor if you are having problems. It's also a good idea to know your test results and keep a list of the medicines you take. How can you care for yourself at home? · Your doctor may suggest an over-the counter lotion or spray or may prescribe a medicine. Take your medicines exactly as prescribed. Call your doctor if you think you are having a problem with your medicine. · Keep your feet clean and dry. · When you get dressed, put your socks on before your underwear. This can prevent the fungus from spreading from your feet to your groin. To prevent athlete's foot  · Wear flip-flops or other shower sandals in public locker rooms and showers and by the pool. · Dry between your toes after swimming or bathing. · Wear leather shoes or sandals, which let air get to your feet. · Change your socks as needed so your feet stay as dry as possible. · Use antifungal powder on your feet. When should you call for help? Watch closely for changes in your health, and be sure to contact your doctor if:  · You do not get better as expected. Where can you learn more? Go to http://nahomi-carlee.info/. Enter M498 in the search box to learn more about \"Athlete's Foot: Care Instructions. \"  Current as of: October 13, 2016  Content Version: 11.4  © 8322-0481 [employer]. Care instructions adapted under license by [employer] (which disclaims liability or warranty for this information). If you have questions about a medical condition or this instruction, always ask your healthcare professional. [place] [address] any warranty or liability for your use of this information.

## 2018-03-09 NOTE — ED NOTES
Received patient to exam room, sitting in a chair in a position of comfort, call bell within reach; pt reports open wound between right great and second toe x 2 weeks, pt is an insulin diabetic

## 2018-03-11 ENCOUNTER — HOSPITAL ENCOUNTER (EMERGENCY)
Age: 58
Discharge: HOME | End: 2018-03-11
Attending: EMERGENCY MEDICINE | Admitting: EMERGENCY MEDICINE
Payer: COMMERCIAL

## 2018-03-11 VITALS
DIASTOLIC BLOOD PRESSURE: 60 MMHG | TEMPERATURE: 98.6 F | WEIGHT: 177.69 LBS | OXYGEN SATURATION: 100 % | SYSTOLIC BLOOD PRESSURE: 135 MMHG | HEIGHT: 68 IN | BODY MASS INDEX: 26.93 KG/M2 | RESPIRATION RATE: 16 BRPM | HEART RATE: 57 BPM

## 2018-03-11 DIAGNOSIS — L03.115 CELLULITIS OF FOOT, RIGHT: ICD-10-CM

## 2018-03-11 DIAGNOSIS — S91.109D OPEN WOUND OF TOE, SUBSEQUENT ENCOUNTER: Primary | ICD-10-CM

## 2018-03-11 DIAGNOSIS — L03.031 CELLULITIS OF TOE, RIGHT: ICD-10-CM

## 2018-03-11 PROCEDURE — 74011250637 HC RX REV CODE- 250/637: Performed by: EMERGENCY MEDICINE

## 2018-03-11 PROCEDURE — 99283 EMERGENCY DEPT VISIT LOW MDM: CPT

## 2018-03-11 RX ORDER — IBUPROFEN 600 MG/1
600 TABLET ORAL
Status: COMPLETED | OUTPATIENT
Start: 2018-03-11 | End: 2018-03-11

## 2018-03-11 RX ORDER — CLINDAMYCIN HYDROCHLORIDE 300 MG/1
300 CAPSULE ORAL 4 TIMES DAILY
Qty: 28 CAP | Refills: 0 | Status: SHIPPED | OUTPATIENT
Start: 2018-03-11 | End: 2018-03-18

## 2018-03-11 RX ORDER — CLINDAMYCIN HYDROCHLORIDE 150 MG/1
300 CAPSULE ORAL
Status: COMPLETED | OUTPATIENT
Start: 2018-03-11 | End: 2018-03-11

## 2018-03-11 RX ADMIN — CLINDAMYCIN HYDROCHLORIDE 300 MG: 150 CAPSULE ORAL at 08:40

## 2018-03-11 RX ADMIN — IBUPROFEN 600 MG: 600 TABLET, FILM COATED ORAL at 08:40

## 2018-03-11 NOTE — ED PROVIDER NOTES
EMERGENCY DEPARTMENT HISTORY AND PHYSICAL EXAM      Date: 3/11/2018  Patient Name: Junie Case    History of Presenting Illness     Chief Complaint   Patient presents with    Wound Check     pt reports to ed for followup to a visit with Dr Analilia Laird regarding his right second toe, pt diagnosed with tinea pedis and cellulitis       History Provided By: Patient    HPI: Junie Case, 59 y.o. male with PMHx significant for diabetes and HTN here for check of R foot wound and infection after being seen in this ED two days ago. He said his wound along his 2nd toe has not changed, nor the color of his 2nd and 3rd toe. He says the swelling has not changed or worsened since being seen two days ago. He also notes the redness visible on his foot is the same as it was when seen on Friday. He is having moderate foot pain but no worse then before. He has been taking his Keflex diligently, including a dose this morning, without relief. He relays the 3rd toe discoloration and swelling has been going on for enmanuel 1 month and the 2nd toe has looked that way for enmanuel 2 weeks. He relays an upcoming appt with both his PMD and a foot/ankle specialist this month. He denies further symptoms or complaints. PCP: Ceasar Canavan, MD    There are no other complaints, changes, or physical findings at this time. Current Outpatient Prescriptions   Medication Sig Dispense Refill    clindamycin (CLEOCIN) 300 mg capsule Take 1 Cap by mouth four (4) times daily for 7 days. 28 Cap 0    naproxen sodium (ALEVE) 220 mg cap Take 2 Caps by mouth every eight (8) hours as needed. 40 Cap 2    clotrimazole (LOTRIMIN) 1 % topical cream Apply  to affected area two (2) times a day for 30 days. Apply twice a day for 2-4 weeks 1 Tube 0    insulin lispro protamine/insulin lispro (HUMALOG MIX 75/25) 100 unit/mL (75-25) injection Take 30units in and 24units in pm +2 units above 150 1 Vial 12    benazepril (LOTENSIN) 20 mg tablet Take 1 Tab by mouth daily.  27 Tab 11    hydroCHLOROthiazide (HYDRODIURIL) 12.5 mg tablet Take 1 Tab by mouth daily. For blood pressure 90 Tab 3    atorvastatin (LIPITOR) 80 mg tablet TAKE 1 TABLET BY MOUTH EVERY DAY 90 Tab 1    ONETOUCH ULTRA TEST strip USE AS DIRECTED 300 Strip 3    doxazosin (CARDURA) 2 mg tablet Take 0.5 Tabs by mouth nightly. 45 Tab 3    amLODIPine (NORVASC) 5 mg tablet TAKE 2 TABLETS BY MOUTH DAILY 120 Tab 12    BD INSULIN SYRINGE ULTRA-FINE 1 mL 30 gauge x 1/2\" syrg USE AS DIRECTED 100 Syringe 12    tadalafil (CIALIS) 20 mg tablet TAKE 1 TABLET BY MOUTH 1 HOUR PRIOR TO SEX ON EMPTY STOMACH 5 Tab 6    acetaminophen (TYLENOL) 325 mg tablet Take  by mouth every four (4) hours as needed for Pain.          Past History     Past Medical History:  Past Medical History:   Diagnosis Date    ARF (acute renal failure) ([facility] [address] [facility] 1/11/2013    Arthritis     knees    CKD stage 3 due to type 2 diabetes mellitus ([facility]     Diabetes ([facility] [address] [facility] 1982    Type 1    DKA (diabetic ketoacidoses) ([facility]     Hyperaldosteronism ([facility] [address] [facility]     s/p surgery    Hypercholesterolemia 11/30/2010    Hypertension 11/30/2010    Ventricular bigeminy 6/29/2011       Past Surgical History:  Past Surgical History:   Procedure Laterality Date    COLONOSCOPY  4-09    kat- normal    ENDOSCOPY, COLON, DIAGNOSTIC  2009    Dr Flakita Sen [facility]  [id number]    left adenoma removed for hyperaldosteronism    HX BUNIONECTOMY Right     HX CATARACT REMOVAL Right     HX HEENT      laser surgery left eye       Family History:  Family History   Problem Relation Age of Onset    Hypertension Father     Diabetes Father      Type 2    Diabetes Brother      Type 2       Social History:  Social History   Substance Use Topics    Smoking status: Never Smoker    Smokeless tobacco: Never Used    Alcohol use 0.0 oz/week     0 Standard drinks or equivalent per week      Comment: may have beer once a week or mixed drink 2 times a month Allergies: Allergies   Allergen Reactions    Benazepril Other (comments)     hyperkalemia         Review of Systems   Review of Systems   Constitutional: Negative for chills and fever. Respiratory: Negative for apnea, cough and shortness of breath. Cardiovascular: Negative for chest pain. Gastrointestinal: Negative for abdominal distention. Endocrine: Negative for cold intolerance. Genitourinary: Negative for dysuria. Musculoskeletal: Negative for arthralgias. Skin: Positive for color change (redness at wound) and wound (w/ pain). Neurological: Negative for dizziness and weakness. Hematological: Negative for adenopathy. All other systems reviewed and are negative. Physical Exam   Physical Exam  Vital signs and nursing notes reviewed    CONSTITUTIONAL: Alert, in no apparent distress; well-developed; well-nourished. HEAD:  Normocephalic, atraumatic  EYES: PERRL; EOM's intact. ENTM: Nose: no rhinorrhea; Throat: no erythema or exudate, mucous membranes moist  Neck:  Supple. trachea is midline. RESP: Chest clear, equal breath sounds. - W/R/R  CV: S1 and S2 WNL; No murmurs, gallops or rubs. 2+ radial and DP pulses bilaterally. GI: non-distended, normal bowel sounds, abdomen soft and non-tender. No masses or organomegaly. : No costo-vertebral angle tenderness. BACK:  Non-tender, normal appearance  UPPER EXT:  Normal inspection. no joint or soft tissue swelling  LOWER EXT: No edema, no calf tenderness. No pedal edema b/l. Hair loss and hyperpigmentation noted on the b/l lower legs. 1 + DP pulses b/l with light touch sensation intact. Patient able to move all toes. 2nd and 3rd toes with darkened color and swelling. Small wound along lateral aspect of R 2nd toe without drainage or bleeding. 2cm X5cm area of redness over the dorsum of the foot, foot is not swollen, no palable subcutaneous air or bone crepitus.   NEURO: Alert and oriented x3, 5/5 strength and light touch sensation intact in bilateral upper and lower extremities. SKIN: No rashes; Warm and dry  PSYCH: Normal mood, normal affect    Medical Decision Making   I am the first provider for this patient. I reviewed the vital signs, available nursing notes, past medical history, past surgical history, family history and social history. Vital Signs-Reviewed the patient's vital signs. No data found. Records Reviewed: Old Medical Records    Provider Notes (Medical Decision Making):   59year old male with ongoing cellulitis and chronic diabetic foot wound without advancement of skin changes since being seen here two days ago but pt remarks has not improved. Would benefit from change of abx from Keflex to Clindamycin, first dose provided here. Discussed elevating foot and need for follow up in 2 days time with his PCP. Pt expressed understanding that if he cannot be seen in 2 days for a wound re-check then he should return to the ED for re-evaluation. Given his diabetic status, if foot is not improving or worsening in 2 days time, would consider it a outpatient treatment failure and likely recommend admission for IV abx. Pt's lower extremity does raise concern for PAD and pt's foot wound likely secondary to diabetic status as well as microvascular disease. Extremely low suspicion for osteomyelitis today. ED Course:   Initial assessment performed. The patients presenting problems have been discussed, and they are in agreement with the care plan formulated and outlined with them. I have encouraged them to ask questions as they arise throughout their visit. Disposition:  Discharge Note:  8:40 AM  The patient has been re-evaluated and is ready for discharge. Reviewed available results with patient. Counseled patient on diagnosis and care plan. Patient has expressed understanding, and all questions have been answered. Patient agrees with plan and agrees to follow up as recommended, or return to the ED if their symptoms worsen. Discharge instructions have been provided and explained to the patient, along with reasons to return to the ED. PLAN:  1. Discharge Medication List as of 3/11/2018  8:36 AM      START taking these medications    Details   clindamycin (CLEOCIN) 300 mg capsule Take 1 Cap by mouth four (4) times daily for 7 days. , Normal, Disp-28 Cap, R-0         CONTINUE these medications which have NOT CHANGED    Details   clotrimazole (LOTRIMIN) 1 % topical cream Apply  to affected area two (2) times a day for 30 days. Apply twice a day for 2-4 weeks, Normal, Disp-1 Tube, R-0      insulin lispro protamine/insulin lispro (HUMALOG MIX 75/25) 100 unit/mL (75-25) injection Take 30units in and 24units in pm +2 units above 150, Normal, Disp-1 Vial, R-12      benazepril (LOTENSIN) 20 mg tablet Take 1 Tab by mouth daily. , Normal, Disp-30 Tab, R-11      insulin NPH/insulin regular (NOVOLIN 70/30) 100 unit/mL (70-30) injection INJECT 30 UNITS SUBCUTANEOUSLY IN THE MORNING AND 24 UNITS IN THE EVENING + 2 units for every 50 mg/dl above 150 mg/dl--Dose change 12/4/17--updated med list--did not send prescription to the pharmacy, No Print, Disp-80 Vial, R-0      hydroCHLOROthiazide (HYDRODIURIL) 12.5 mg tablet Take 1 Tab by mouth daily.  For blood pressure, Normal, Disp-90 Tab, R-3      atorvastatin (LIPITOR) 80 mg tablet TAKE 1 TABLET BY MOUTH EVERY DAY, Normal, Disp-90 Tab, R-1      ONETOUCH ULTRA TEST strip USE AS DIRECTED, Normal, Disp-300 Strip, R-3      doxazosin (CARDURA) 2 mg tablet Take 0.5 Tabs by mouth nightly., Normal, Disp-45 Tab, R-3      amLODIPine (NORVASC) 5 mg tablet TAKE 2 TABLETS BY MOUTH DAILY, Normal, Disp-120 Tab, R-12      BD INSULIN SYRINGE ULTRA-FINE 1 mL 30 gauge x 1/2\" syrg USE AS DIRECTED, Normal, Disp-100 Syringe, R-12      tadalafil (CIALIS) 20 mg tablet TAKE 1 TABLET BY MOUTH 1 HOUR PRIOR TO SEX ON EMPTY STOMACH, Normal, Disp-5 Tab, R-6      acetaminophen (TYLENOL) 325 mg tablet Take  by mouth every four (4) hours as needed for Pain., Historical Med         STOP taking these medications       cephALEXin (KEFLEX) 500 mg capsule Comments:   Reason for Stoppin.   Follow-up Information     Follow up With Details Comments Contact Info    Shanell Hoyt MD  Please call Dr. Kristal Nunez for an appointment in 2 days. If Dr. Kristal Nunez cannot see you in 2 days, please return to the emergency department for a wound check. [place] [id number]  [phone]      [facility]ospit[facility] EMERGENCY DEPT  If symptoms worsen including spreading redness up foot and leg, uncontrolled pain or other new concerning symptoms. [address] [place] [address]  [phone]        Return to ED if worse     Diagnosis     Clinical Impression:   1. Open wound of toe, subsequent encounter    2. Cellulitis of foot, right    3. Cellulitis of toe, right        Attestations: This note is completed by So Olsen, acting as Scribe for Charla Copeland MD.    Charla Copeland MD: The scribe's documentation has been prepared under my direction and personally reviewed by me in its entirety. I confirm that the note above accurately reflects all work, treatment, procedures, and medical decision making performed by me.

## 2018-03-11 NOTE — DISCHARGE INSTRUCTIONS
Please elevate your foot as much as possible above the level of your heart. Cellulitis: Care Instructions  Your Care Instructions    Cellulitis is a skin infection. It often occurs after a break in the skin from a scrape, cut, bite, or puncture, or after a rash. The doctor has checked you carefully, but problems can develop later. If you notice any problems or new symptoms, get medical treatment right away. Follow-up care is a key part of your treatment and safety. Be sure to make and go to all appointments, and call your doctor if you are having problems. It's also a good idea to know your test results and keep a list of the medicines you take. How can you care for yourself at home? · Take your antibiotics as directed. Do not stop taking them just because you feel better. You need to take the full course of antibiotics. · Prop up the infected area on pillows to reduce pain and swelling. Try to keep the area above the level of your heart as often as you can. · If your doctor told you how to care for your wound, follow your doctor's instructions. If you did not get instructions, follow this general advice:  ¨ Wash the wound with clean water 2 times a day. Don't use hydrogen peroxide or alcohol, which can slow healing. ¨ You may cover the wound with a thin layer of petroleum jelly, such as Vaseline, and a nonstick bandage. ¨ Apply more petroleum jelly and replace the bandage as needed. · Be safe with medicines. Take pain medicines exactly as directed. ¨ If the doctor gave you a prescription medicine for pain, take it as prescribed. ¨ If you are not taking a prescription pain medicine, ask your doctor if you can take an over-the-counter medicine. To prevent cellulitis in the future  · Try to prevent cuts, scrapes, or other injuries to your skin. Cellulitis most often occurs where there is a break in the skin.   · If you get a scrape, cut, mild burn, or bite, wash the wound with clean water as soon as you can to help avoid infection. Don't use hydrogen peroxide or alcohol, which can slow healing. · If you have swelling in your legs (edema), support stockings and good skin care may help prevent leg sores and cellulitis. · Take care of your feet, especially if you have diabetes or other conditions that increase the risk of infection. Wear shoes and socks. Do not go barefoot. If you have athlete's foot or other skin problems on your feet, talk to your doctor about how to treat them. When should you call for help? Call your doctor now or seek immediate medical care if:  ? · You have signs that your infection is getting worse, such as:  ¨ Increased pain, swelling, warmth, or redness. ¨ Red streaks leading from the area. ¨ Pus draining from the area. ¨ A fever. ? · You get a rash. ? Watch closely for changes in your health, and be sure to contact your doctor if:  ? · You are not getting better after 1 day (24 hours). ? · You do not get better as expected. Where can you learn more? Go to http://nahomi-carlee.info/. Rossi Dodson in the search box to learn more about \"Cellulitis: Care Instructions. \"  Current as of: October 13, 2016  Content Version: 11.4  © 1797-6614 [employer]. Care instructions adapted under license by [employer] (which disclaims liability or warranty for this information). If you have questions about a medical condition or this instruction, always ask your healthcare professional. [place] [address] any warranty or liability for your use of this information.

## 2018-03-11 NOTE — ED NOTES
MD mcghee has reviewed discharge instructions with the patient. The patient verbalized understanding. Pt discharged with written instructions. No further concerns at this time.

## 2018-03-13 ENCOUNTER — OFFICE VISIT (OUTPATIENT)
Dept: FAMILY MEDICINE CLINIC | Age: 58
End: 2018-03-13

## 2018-03-13 ENCOUNTER — TELEPHONE (OUTPATIENT)
Dept: FAMILY MEDICINE CLINIC | Age: 58
End: 2018-03-13

## 2018-03-13 VITALS
RESPIRATION RATE: 14 BRPM | SYSTOLIC BLOOD PRESSURE: 134 MMHG | OXYGEN SATURATION: 100 % | BODY MASS INDEX: 27.74 KG/M2 | HEART RATE: 77 BPM | HEIGHT: 68 IN | WEIGHT: 183 LBS | TEMPERATURE: 99.3 F | DIASTOLIC BLOOD PRESSURE: 56 MMHG

## 2018-03-13 DIAGNOSIS — L02.611 TOE ABSCESS, RIGHT: Primary | ICD-10-CM

## 2018-03-13 PROBLEM — E11.21 TYPE 2 DIABETES WITH NEPHROPATHY (HCC): Status: ACTIVE | Noted: 2018-03-13

## 2018-03-13 RX ORDER — CHOLECALCIFEROL (VITAMIN D3) 125 MCG
CAPSULE ORAL
COMMUNITY
End: 2018-03-13 | Stop reason: SDUPTHER

## 2018-03-13 RX ORDER — CHOLECALCIFEROL (VITAMIN D3) 125 MCG
2 CAPSULE ORAL
Qty: 40 CAP | Refills: 2
Start: 2018-03-13 | End: 2018-07-06 | Stop reason: DRUGHIGH

## 2018-03-13 NOTE — PROGRESS NOTES
HISTORY OF PRESENT ILLNESS  Donte Mcmanus is a 59 y.o. male. HPI Has had cellulitis of R 3rd and 2nd toe, began one month ago. Symptoms got much worse Friday, went to the ER, was given some antibiotics and cream.     ROS    Physical Exam   Constitutional: He appears well-developed and well-nourished. HENT:   Right Ear: External ear normal.   Left Ear: External ear normal.   Mouth/Throat: Oropharynx is clear and moist.   Neck: No thyromegaly present. Cardiovascular: Normal rate, regular rhythm, normal heart sounds and intact distal pulses. Pulmonary/Chest: Effort normal and breath sounds normal. No respiratory distress. He has no wheezes. Abdominal: Soft. Bowel sounds are normal. He exhibits no distension and no mass. There is no tenderness. There is no guarding. Musculoskeletal: Normal range of motion. He exhibits no edema. R 2nd toe- markedly swollen with fluctuant mass on dorsum of toe, pressing on it led to copious purulent drainage from medial toe. Lymphadenopathy:     He has no cervical adenopathy. Nursing note and vitals reviewed. ASSESSMENT and PLAN  Orders Placed This Encounter    DRAIN SKIN ABSCESS SIMPLE    naproxen sodium (ALEVE) 220 mg cap     Diagnoses and all orders for this visit:    1. Toe abscess, right  -     DRAIN SKIN ABSCESS SIMPLE    Other orders  -     naproxen sodium (ALEVE) 220 mg cap; Take 2 Caps by mouth every eight (8) hours as needed. Follow-up Disposition:  Return in about 1 day (around 3/14/2018). Moderate drainage after I and D. wund packed. Recheck tomorrow.

## 2018-03-13 NOTE — TELEPHONE ENCOUNTER
Called pt to let him know appt was made at Wednesday, March 14, 2018 03:45 PM for Dr. Thomas Swan to treat his 2nd right toe. Pt verbalized understanding.

## 2018-03-13 NOTE — MR AVS SNAPSHOT
[facility] 
 
 
 [address] [facility] [place] [address] [id number] [phone] Patient: Junie Case MRN: [id number] Visit Information Date & Time Provider Department Dept. Phone Encounter #  
 3/13/2018  4:00 PM Ceasar Canavan, MD [facility] [phone] [id number] Follow-up Instructions Return in about 1 day (around 3/14/2018). Your Appointments 3/27/2018  3:30 PM  
ROUTINE CARE with Ceasar Canavan, MD  
[facility] [address] [facility]) Appt Note: routine follow up  
 [address] [facility] [place] [address] [id number] [phone] [address] [facility] [address] Upcoming Health Maintenance Date Due  
 EYE EXAM RETINAL OR DILATED Q1 11/30/2017 HEMOGLOBIN A1C Q6M 5/14/2018 MICROALBUMIN Q1 11/14/2018 LIPID PANEL Q1 11/14/2018 FOOT EXAM Q1 12/4/2018 COLONOSCOPY 4/10/2019 Pneumococcal 19-64 Highest Risk (3 of 3 - PPSV23) 9/15/2021 DTaP/Tdap/Td series (2 - Td) 9/15/2026 Allergies as of 3/13/2018  Review Complete On: 3/13/2018 By: Ceasar Canavan, MD  
  
 Severity Noted Reaction Type Reactions Benazepril  09/21/2015    Other (comments)  
 hyperkalemia Current Immunizations  Reviewed on 1/18/2013 Name Date Influenza Vaccine 10/31/2013, 10/29/2012 Influenza Vaccine Split 9/30/2010 Influenza Vaccine Whole 11/28/2011 Pneumococcal Vaccine (Pcv) 10/30/2004 Not reviewed this visit You Were Diagnosed With   
  
 Codes Comments Toe abscess, right    -  Primary ICD-10-CM: C47.708 ICD-9-CM: 681.10 Vitals BP Pulse Temp Resp Height(growth percentile) Weight(growth percentile) 134/56 77 99.3 °F (37.4 °C) (Oral) 14 5' 8\" (1.727 m) 183 lb (83 kg) SpO2 BMI Smoking Status 100% 27.83 kg/m2 Never Smoker BMI and BSA Data  Body Mass Index Body Surface Area  
 27.83 kg/m 2 2 m 2  
  
  
 Preferred Pharmacy Pharmacy Name Phone [id number] Zarate [address] [phone] Your Updated Medication List  
  
   
This list is accurate as of 3/13/18  5:04 PM.  Always use your most recent med list. amLODIPine 5 mg tablet Commonly known as:  Deborha Cords TAKE 2 TABLETS BY MOUTH DAILY  
  
 atorvastatin 80 mg tablet Commonly known as:  LIPITOR  
TAKE 1 TABLET BY MOUTH EVERY DAY  
  
 BD INSULIN SYRINGE ULTRA-FINE 1 mL 30 gauge x 1/2\" Syrg Generic drug:  Insulin Syringe-Needle U-100  
USE AS DIRECTED  
  
 benazepril 20 mg tablet Commonly known as:  LOTENSIN Take 1 Tab by mouth daily. clindamycin 300 mg capsule Commonly known as:  CLEOCIN Take 1 Cap by mouth four (4) times daily for 7 days. clotrimazole 1 % topical cream  
Commonly known as:  Leonid Robinsons Apply  to affected area two (2) times a day for 30 days. Apply twice a day for 2-4 weeks  
  
 doxazosin 2 mg tablet Commonly known as:  CARDURA Take 0.5 Tabs by mouth nightly. hydroCHLOROthiazide 12.5 mg tablet Commonly known as:  HYDRODIURIL Take 1 Tab by mouth daily. For blood pressure  
  
 insulin lispro protamine/insulin lispro 100 unit/mL (75-25) injection Commonly known as:  HUMALOG MIX 75/25 Take 30units in and 24units in pm +2 units above 150  
  
 naproxen sodium 220 mg Cap Commonly known as:  Areli Estimable Take 2 Caps by mouth every eight (8) hours as needed. ONETOUCH ULTRA TEST strip Generic drug:  glucose blood VI test strips USE AS DIRECTED  
  
 tadalafil 20 mg tablet Commonly known as:  CIALIS  
TAKE 1 TABLET BY MOUTH 1 HOUR PRIOR TO SEX ON EMPTY STOMACH  
  
 TYLENOL 325 mg tablet Generic drug:  acetaminophen Take  by mouth every four (4) hours as needed for Pain. We Performed the Following DRAIN SKIN ABSCESS SIMPLE [98472 CPT(R)] Follow-up Instructions Return in about 1 day (around 3/14/2018). Introducing [facility]! [facility] introduces "[employer] patient portal. Now you can access parts of your medical record, email your doctor's office, and request medication refills online. 1. In your internet browser, go to [email or web address] 2. Click on the First Time User? Click Here link in the Sign In box. You will see the New Member Sign Up page. 3. Enter your "[employer] Access Code exactly as it appears below. You will not need to use this code after youve completed the sign-up process. If you do not sign up before the expiration date, you must request a new code. · "[employer] Access Code: [email or web address] Expires: 6/7/2018 10:20 AM 
 
4. Enter the last four digits of your Social Security Number (xxxx) and Date of Birth (mm/dd/yyyy) as indicated and click Submit. You will be taken to the next sign-up page. 5. Create a "[employer] ID. This will be your "[employer] login ID and cannot be changed, so think of one that is secure and easy to remember. 6. Create a "[employer] password. You can change your password at any time. 7. Enter your Password Reset Question and Answer. This can be used at a later time if you forget your password. 8. Enter your e-mail address. You will receive e-mail notification when new information is available in [address] 9. Click Sign Up. You can now view and download portions of your medical record. 10. Click the Download Summary menu link to download a portable copy of your medical information. If you have questions, please visit the Frequently Asked Questions section of the "[employer] website. Remember, "[employer] is NOT to be used for urgent needs. For medical emergencies, dial 911. Now available from your iPhone and Android! Please provide this summary of care documentation to your next provider. Your primary care clinician is listed as Rama Shields.  If you have any questions after today's visit, please call [phone].

## 2018-03-13 NOTE — PROGRESS NOTES
Chief Complaint   Patient presents with   [facility] Follow-up     right foot cellulitis  ED [facility]  March 9th and 11th 2018     1. Have you been to the ER, urgent care clinic since your last visit? Hospitalized since your last visit? Yes see chief complaint     2. Have you seen or consulted any other health care providers outside of the [address] [place] [address] since your last visit? Include any pap smears or colon screening. no    Health Maintenance Due   Topic Date Due    EYE EXAM RETINAL OR DILATED Q1  11/30/2017     Pt refused retinal scan. Pt given release form.     [facility]  OFFICE PROCEDURE PROGRESS NOTE        Chart reviewed for the following:   Marisa Gonzalez LPN, have reviewed the History, Physical and updated the Allergic reactions for [address] performed immediately prior to start of procedure:   IKayla LPN, have performed the following reviews on Marquise Morris prior to the start of the procedure:            * Patient was identified by name and date of birth   * Agreement on procedure being performed was verified  * Risks and Benefits explained to the patient  * Procedure site verified and marked as necessary  * Patient was positioned for comfort  * Consent was signed and verified     Time: 4:56pm      Date of procedure: 3/13/2018    Procedure performed by:  aCssandra Ortiz MD    Provider assisted by: Kayla Aceves LPN    Patient assisted by: Kayla Aceves LPN    How tolerated by patient: Pt was numbed and tolerated procedure well    Post Procedural Pain Scale: Pt reported pain at a \"0\"    Comments I and D of right 2nd toe

## 2018-03-14 ENCOUNTER — OFFICE VISIT (OUTPATIENT)
Dept: FAMILY MEDICINE CLINIC | Age: 58
End: 2018-03-14

## 2018-03-14 VITALS
RESPIRATION RATE: 14 BRPM | TEMPERATURE: 98.7 F | DIASTOLIC BLOOD PRESSURE: 60 MMHG | OXYGEN SATURATION: 99 % | HEART RATE: 60 BPM | SYSTOLIC BLOOD PRESSURE: 130 MMHG | HEIGHT: 68 IN

## 2018-03-14 DIAGNOSIS — L02.91 ABSCESS: Primary | ICD-10-CM

## 2018-03-14 NOTE — PROGRESS NOTES
Chief Complaint   Patient presents with    Toe Pain     f/u  right 2nd toe      1. Have you been to the ER, urgent care clinic since your last visit? Hospitalized since your last visit? No    2. Have you seen or consulted any other health care providers outside of the [address] [place] [address] since your last visit? Include any pap smears or colon screening. No     There are no preventive care reminders to display for this patient.

## 2018-03-14 NOTE — PROGRESS NOTES
HISTORY OF PRESENT ILLNESS  Ricardo Sanches is a 59 y.o. male. HPIIn for recheck R foot abscess. Foot is feeling better. ROS    Physical Exam   Musculoskeletal:   R 2nd toe - 0.5 cm opening on dorsum of toe- minimal drainage. Distal foot is also much less erythematous   excellent dp pulse. ASSESSMENT and PLAN  No orders of the defined types were placed in this encounter. Diagnoses and all orders for this visit:    1. Abscess      Follow-up Disposition: Not on File  Dressing change. To soak foot in warm water this weekend. Recheck on Monday. Continue antibiotics.

## 2018-03-19 ENCOUNTER — OFFICE VISIT (OUTPATIENT)
Dept: FAMILY MEDICINE CLINIC | Age: 58
End: 2018-03-19

## 2018-03-19 VITALS
HEART RATE: 60 BPM | DIASTOLIC BLOOD PRESSURE: 63 MMHG | TEMPERATURE: 97.6 F | SYSTOLIC BLOOD PRESSURE: 138 MMHG | HEIGHT: 68 IN | RESPIRATION RATE: 14 BRPM | OXYGEN SATURATION: 99 %

## 2018-03-19 DIAGNOSIS — L08.9 DIABETIC FOOT INFECTION (HCC): Primary | ICD-10-CM

## 2018-03-19 DIAGNOSIS — E11.628 DIABETIC FOOT INFECTION (HCC): Primary | ICD-10-CM

## 2018-03-19 RX ORDER — CLINDAMYCIN HYDROCHLORIDE 300 MG/1
300 CAPSULE ORAL 3 TIMES DAILY
Qty: 30 CAP | Refills: 0 | Status: SHIPPED | OUTPATIENT
Start: 2018-03-19 | End: 2018-03-29

## 2018-03-19 NOTE — PROGRESS NOTES
Chief Complaint   Patient presents with    Toe Swelling     right 2nd toe f/u     1. Have you been to the ER, urgent care clinic since your last visit? Hospitalized since your last visit? No    2. Have you seen or consulted any other health care providers outside of the [address] [place] [address] since your last visit? Include any pap smears or colon screening. No     There are no preventive care reminders to display for this patient.

## 2018-03-19 NOTE — MR AVS SNAPSHOT
[facility] 
 
 
 [address] [facility] [place] [address] [address] 
[phone] Patient: Ruth Gallo MRN: [id number] Visit Information Date & Time Provider Department Dept. Phone Encounter #  
 3/19/2018  3:15 PM Saman Webb MD [facility] [phone] [id number] Your Appointments 3/27/2018  3:30 PM  
ROUTINE CARE with Saman Webb MD  
[facility]: routine follow up  
 [address] [facility] [place] [address] [id number] [phone] [address] [facility] [address] Upcoming Health Maintenance Date Due  
 EYE EXAM RETINAL OR DILATED Q1 3/27/2019* HEMOGLOBIN A1C Q6M 5/14/2018 MICROALBUMIN Q1 11/14/2018 LIPID PANEL Q1 11/14/2018 FOOT EXAM Q1 12/4/2018 COLONOSCOPY 4/10/2019 Pneumococcal 19-64 Highest Risk (3 of 3 - PPSV23) 9/15/2021 DTaP/Tdap/Td series (2 - Td) 9/15/2026 *Topic was postponed. The date shown is not the original due date. Allergies as of 3/19/2018  Review Complete On: 3/19/2018 By: Lacie Persaud LPN Severity Noted Reaction Type Reactions Benazepril  09/21/2015    Other (comments)  
 hyperkalemia Current Immunizations  Reviewed on 1/18/2013 Name Date Influenza Vaccine 10/31/2013, 10/29/2012 Influenza Vaccine Split 9/30/2010 Influenza Vaccine Whole 11/28/2011 Pneumococcal Vaccine (Pcv) 10/30/2004 Not reviewed this visit You Were Diagnosed With   
  
 Codes Comments Di[facility] foot [facility] ([facility]    -  Primary ICD-10-CM: E11.69, L08.9 ICD-9-CM: 250.80, 686.9 Vitals BP Pulse Temp Resp Height(growth percentile) SpO2  
 138/63 60 97.6 °F (36.4 °C) (Oral) 14 5' 8\" (1.727 m) 99% Smoking Status Never Smoker Preferred Pharmacy Pharmacy Name Phone [id number] [address] [phone] Your Updated Medication List  
  
   
This list is accurate as of 3/19/18  4:35 PM.  Always use your most recent med list. amLODIPine 5 mg tablet Commonly known as:  Caroline Prow TAKE 2 TABLETS BY MOUTH DAILY  
  
 atorvastatin 80 mg tablet Commonly known as:  LIPITOR  
TAKE 1 TABLET BY MOUTH EVERY DAY  
  
 BD INSULIN SYRINGE ULTRA-FINE 1 mL 30 gauge x 1/2\" Syrg Generic drug:  Insulin Syringe-Needle U-100  
USE AS DIRECTED  
  
 benazepril 20 mg tablet Commonly known as:  LOTENSIN Take 1 Tab by mouth daily. clindamycin 300 mg capsule Commonly known as:  CLEOCIN Take 1 Cap by mouth three (3) times daily for 10 days. clotrimazole 1 % topical cream  
Commonly known as:  Cyndee Drafts Apply  to affected area two (2) times a day for 30 days. Apply twice a day for 2-4 weeks  
  
 doxazosin 2 mg tablet Commonly known as:  CARDURA Take 0.5 Tabs by mouth nightly. hydroCHLOROthiazide 12.5 mg tablet Commonly known as:  HYDRODIURIL Take 1 Tab by mouth daily. For blood pressure  
  
 insulin lispro protamine/insulin lispro 100 unit/mL (75-25) injection Commonly known as:  HUMALOG MIX 75/25 Take 30units in and 24units in pm +2 units above 150  
  
 naproxen sodium 220 mg Cap Commonly known as:  Giovanna Bigger Take 2 Caps by mouth every eight (8) hours as needed. ONETOUCH ULTRA TEST strip Generic drug:  glucose blood VI test strips USE AS DIRECTED  
  
 tadalafil 20 mg tablet Commonly known as:  CIALIS  
TAKE 1 TABLET BY MOUTH 1 HOUR PRIOR TO SEX ON EMPTY STOMACH  
  
 TYLENOL 325 mg tablet Generic drug:  acetaminophen Take  by mouth every four (4) hours as needed for Pain. Prescriptions Sent to Pharmacy Refills  
 clindamycin (CLEOCIN) 300 mg capsule 0 Sig: Take 1 Cap by mouth three (3) times daily for 10 days.   
 Class: Normal  
 Pharmacy: [facility]/pharmacy #[id number] - [place], [address] [place] [address] #: [phone] Route: Oral  
  
We Performed the Following REFERRAL TO WOUND CARE [[id number] Custom] Referral Information Referral ID Referred By Referred To  
  
 [id number] Sonam Sy Not Available Visits Status Start Date End Date 1 New Request 3/19/18 3/19/19 If your referral has a status of pending review or denied, additional information will be sent to support the outcome of this decision. Introducing [address]! [facility] introduces [email or web address] patient portal. Now you can access parts of your medical record, email your doctor's office, and request medication refills online. 1. In your internet browser, go to [email or web address] 2. Click on the First Time User? Click Here link in the Sign In box. You will see the New Member Sign Up page. 3. Enter your [email or web address] Access Code exactly as it appears below. You will not need to use this code after youve completed the sign-up process. If you do not sign up before the expiration date, you must request a new code. · [email or web address] Access Code: [email or web address] Expires: 6/7/2018 10:20 AM 
 
4. Enter the last four digits of your Social Security Number (xxxx) and Date of Birth (mm/dd/yyyy) as indicated and click Submit. You will be taken to the next sign-up page. 5. Create a [email or web address] ID. This will be your [email or web address] login ID and cannot be changed, so think of one that is secure and easy to remember. 6. Create a [email or web address] password. You can change your password at any time. 7. Enter your Password Reset Question and Answer. This can be used at a later time if you forget your password. 8. Enter your e-mail address. You will receive e-mail notification when new information is available in [address] 9. Click Sign Up. You can now view and download portions of your medical record. 10. Click the Download Summary menu link to download a portable copy of your medical information. If you have questions, please visit the Frequently Asked Questions section of the [email or web address] website. Remember, [email or web address] is NOT to be used for urgent needs. For medical emergencies, dial 911. Now available from your iPhone and Android! Please provide this summary of care documentation to your next provider. Your primary care clinician is listed as William Pack. If you have any questions after today's visit, please call [phone].

## 2018-03-19 NOTE — PROGRESS NOTES
HISTORY OF PRESENT ILLNESS  Tiana Juan is a 59 y.o. male. HPI In for followup. still having some purulent drainage from R 2nd toe. Has an appt with podiatry tomorrow at [facility]. ROS    Physical Exam   Musculoskeletal:   R 2nd toe- much less drainage than less week, but still has some serous drainage. Decrease in redness and swelling of distal foot. 2+ dp pulse. ASSESSMENT and PLAN  Orders Placed This Encounter    REFERRAL TO WOUND CARE    clindamycin (CLEOCIN) 300 mg capsule     Diagnoses and all orders for this visit:    1. Diabetic foot infection ([address]  -     REFERRAL TO WOUND CARE    Other orders  -     clindamycin (CLEOCIN) 300 mg capsule; Take 1 Cap by mouth three (3) times daily for 10 days. Follow-up Disposition: Not on File  Recheck pt in one week. To keep appt at [facility] tomorrow and will also refer to wound care clinic.

## 2018-03-21 ENCOUNTER — DOCUMENTATION ONLY (OUTPATIENT)
Dept: FAMILY MEDICINE CLINIC | Age: 58
End: 2018-03-21

## 2018-03-21 ENCOUNTER — HOSPITAL ENCOUNTER (OUTPATIENT)
Dept: LAB | Age: 58
Discharge: HOME | End: 2018-03-21

## 2018-03-21 NOTE — PROGRESS NOTES
oseas disability paperwork completed and faxed to [facility].

## 2018-03-30 RX ORDER — SYRINGE-NEEDLE,INSULIN,0.5 ML 27GX1/2"
SYRINGE, EMPTY DISPOSABLE MISCELLANEOUS
Qty: 200 SYRINGE | Refills: 12 | Status: SHIPPED | OUTPATIENT
Start: 2018-03-30 | End: 2018-07-06

## 2018-04-09 RX ORDER — TADALAFIL 20 MG/1
TABLET, FILM COATED ORAL
Qty: 5 TAB | Refills: 5 | Status: SHIPPED | OUTPATIENT
Start: 2018-04-09 | End: 2018-12-23 | Stop reason: SDUPTHER

## 2018-04-09 RX ORDER — AMLODIPINE BESYLATE 5 MG/1
TABLET ORAL
Qty: 180 TAB | Refills: 3 | Status: SHIPPED | OUTPATIENT
Start: 2018-04-09 | End: 2018-10-15 | Stop reason: ALTCHOICE

## 2018-07-05 NOTE — TELEPHONE ENCOUNTER
Last Visit: 3/19/-18-[place]  Next Appt: 7/23/18-[place]  Previous Refill Encounter: 4/9/18-1 vial -12 [facility])    Requested Prescriptions     Pending Prescriptions Disp Refills    insulin lispro protamine/insulin lispro (HUMALOG MIX 75/25) 100 unit/mL (75-25) injection 1 Vial 12     Sig: Take 30units in and 24units in pm +2 units above 150

## 2018-07-06 ENCOUNTER — OFFICE VISIT (OUTPATIENT)
Dept: URGENT CARE | Age: 58
End: 2018-07-06

## 2018-07-06 ENCOUNTER — HOSPITAL ENCOUNTER (INPATIENT)
Age: 58
LOS: 1 days | Discharge: HOME | DRG: 638 | End: 2018-07-07
Attending: EMERGENCY MEDICINE | Admitting: EMERGENCY MEDICINE
Payer: COMMERCIAL

## 2018-07-06 VITALS
TEMPERATURE: 98.6 F | HEIGHT: 68 IN | OXYGEN SATURATION: 98 % | RESPIRATION RATE: 18 BRPM | HEART RATE: 84 BPM | SYSTOLIC BLOOD PRESSURE: 159 MMHG | DIASTOLIC BLOOD PRESSURE: 86 MMHG | WEIGHT: 159 LBS | BODY MASS INDEX: 24.1 KG/M2

## 2018-07-06 DIAGNOSIS — E11.21 TYPE 2 DIABETES WITH NEPHROPATHY (HCC): ICD-10-CM

## 2018-07-06 DIAGNOSIS — R11.10 VOMITING, INTRACTABILITY OF VOMITING NOT SPECIFIED, PRESENCE OF NAUSEA NOT SPECIFIED, UNSPECIFIED VOMITING TYPE: Primary | ICD-10-CM

## 2018-07-06 DIAGNOSIS — N17.9 AKI (ACUTE KIDNEY INJURY) (HCC): ICD-10-CM

## 2018-07-06 DIAGNOSIS — E86.0 DEHYDRATION: ICD-10-CM

## 2018-07-06 DIAGNOSIS — E10.10 DIABETIC KETOACIDOSIS WITHOUT COMA ASSOCIATED WITH TYPE 1 DIABETES MELLITUS (HCC): ICD-10-CM

## 2018-07-06 DIAGNOSIS — E10.65 HYPERGLYCEMIA DUE TO TYPE 1 DIABETES MELLITUS (HCC): ICD-10-CM

## 2018-07-06 DIAGNOSIS — R73.9 HYPERGLYCEMIA: ICD-10-CM

## 2018-07-06 DIAGNOSIS — E87.5 HYPERKALEMIA: ICD-10-CM

## 2018-07-06 DIAGNOSIS — E10.10 TYPE 1 DIABETES MELLITUS WITH KETOACIDOSIS WITHOUT COMA (HCC): Primary | ICD-10-CM

## 2018-07-06 LAB
ADMINISTERED INITIALS, ADMINIT: NORMAL
ALBUMIN SERPL-MCNC: 3.6 G/DL (ref 3.5–5)
ALBUMIN/GLOB SERPL: 0.9 {RATIO} (ref 1.1–2.2)
ALP SERPL-CCNC: 111 U/L (ref 45–117)
ALT SERPL-CCNC: 27 U/L (ref 12–78)
ANION GAP BLD CALC-SCNC: 19 MMOL/L
ANION GAP SERPL CALC-SCNC: 8 MMOL/L (ref 5–15)
ANION GAP SERPL CALC-SCNC: 9 MMOL/L (ref 5–15)
ANION GAP SERPL CALC-SCNC: 9 MMOL/L (ref 5–15)
APPEARANCE UR: CLEAR
AST SERPL-CCNC: 10 U/L (ref 15–37)
BACTERIA URNS QL MICRO: NEGATIVE /HPF
BILIRUB SERPL-MCNC: 0.5 MG/DL (ref 0.2–1)
BILIRUB UR QL: NEGATIVE
BUN BLD-MCNC: 56 MG/DL
BUN SERPL-MCNC: 42 MG/DL (ref 6–20)
BUN SERPL-MCNC: 52 MG/DL (ref 6–20)
BUN SERPL-MCNC: 57 MG/DL (ref 6–20)
BUN/CREAT SERPL: 24 (ref 12–20)
BUN/CREAT SERPL: 25 (ref 12–20)
BUN/CREAT SERPL: 25 (ref 12–20)
CALCIUM SERPL-MCNC: 8.8 MG/DL (ref 8.5–10.1)
CALCIUM SERPL-MCNC: 8.8 MG/DL (ref 8.5–10.1)
CALCIUM SERPL-MCNC: 9 MG/DL (ref 8.5–10.1)
CHLORIDE BLD-SCNC: 99 MMOL/L
CHLORIDE SERPL-SCNC: 101 MMOL/L (ref 97–108)
CHLORIDE SERPL-SCNC: 106 MMOL/L (ref 97–108)
CHLORIDE SERPL-SCNC: 111 MMOL/L (ref 97–108)
CO2 BLD-SCNC: 24 MMOL/L
CO2 SERPL-SCNC: 25 MMOL/L (ref 21–32)
COLOR UR: ABNORMAL
CREAT BLD-MCNC: 2 MG/DL (ref 0.6–1.3)
CREAT SERPL-MCNC: 1.72 MG/DL (ref 0.7–1.3)
CREAT SERPL-MCNC: 2.1 MG/DL (ref 0.7–1.3)
CREAT SERPL-MCNC: 2.32 MG/DL (ref 0.7–1.3)
D50 ADMINISTERED, D50ADM: 0 ML
D50 ADMINISTERED, D50ADM: 13 ML
D50 ORDER, D50ORD: 0 ML
D50 ORDER, D50ORD: 13 ML
EPITH CASTS URNS QL MICRO: ABNORMAL /LPF
ERYTHROCYTE [DISTWIDTH] IN BLOOD BY AUTOMATED COUNT: 12 % (ref 11.5–14.5)
EST. AVERAGE GLUCOSE BLD GHB EST-MCNC: 278 MG/DL
GLOBULIN SER CALC-MCNC: 4.1 G/DL (ref 2–4)
GLSCOM COMMENTS: NORMAL
GLUCOSE BLD STRIP.AUTO-MCNC: 122 MG/DL (ref 65–100)
GLUCOSE BLD STRIP.AUTO-MCNC: 159 MG/DL (ref 65–100)
GLUCOSE BLD STRIP.AUTO-MCNC: 223 MG/DL (ref 65–100)
GLUCOSE BLD STRIP.AUTO-MCNC: 227 MG/DL (ref 65–100)
GLUCOSE BLD STRIP.AUTO-MCNC: 230 MG/DL (ref 65–100)
GLUCOSE BLD STRIP.AUTO-MCNC: 236 MG/DL (ref 65–100)
GLUCOSE BLD STRIP.AUTO-MCNC: 253 MG/DL (ref 65–100)
GLUCOSE BLD STRIP.AUTO-MCNC: 278 MG/DL (ref 65–100)
GLUCOSE BLD STRIP.AUTO-MCNC: 301 MG/DL (ref 65–100)
GLUCOSE BLD STRIP.AUTO-MCNC: 478 MG/DL
GLUCOSE BLD STRIP.AUTO-MCNC: 67 MG/DL (ref 65–100)
GLUCOSE POC: ABNORMAL MG/DL
GLUCOSE SERPL-MCNC: 230 MG/DL (ref 65–100)
GLUCOSE SERPL-MCNC: 439 MG/DL (ref 65–100)
GLUCOSE SERPL-MCNC: 81 MG/DL (ref 65–100)
GLUCOSE UR STRIP.AUTO-MCNC: >1000 MG/DL
GLUCOSE, GLC: 122 MG/DL
GLUCOSE, GLC: 159 MG/DL
GLUCOSE, GLC: 223 MG/DL
GLUCOSE, GLC: 227 MG/DL
GLUCOSE, GLC: 230 MG/DL
GLUCOSE, GLC: 236 MG/DL
GLUCOSE, GLC: 253 MG/DL
GLUCOSE, GLC: 67 MG/DL
HBA1C MFR BLD: 11.3 % (ref 4.2–6.3)
HCT VFR BLD AUTO: 42.4 % (ref 36.6–50.3)
HCT VFR BLD CALC: 53 %
HGB BLD-MCNC: 14.2 G/DL (ref 12.1–17)
HGB BLD-MCNC: ABNORMAL G/DL
HGB UR QL STRIP: NEGATIVE
HIGH TARGET, HITG: 250 MG/DL
HYALINE CASTS URNS QL MICRO: ABNORMAL /LPF (ref 0–5)
INSULIN ADMINSTERED, INSADM: 0 UNITS/HOUR
INSULIN ADMINSTERED, INSADM: 0.3 UNITS/HOUR
INSULIN ADMINSTERED, INSADM: 3 UNITS/HOUR
INSULIN ADMINSTERED, INSADM: 3.3 UNITS/HOUR
INSULIN ADMINSTERED, INSADM: 3.4 UNITS/HOUR
INSULIN ADMINSTERED, INSADM: 3.5 UNITS/HOUR
INSULIN ADMINSTERED, INSADM: 4.9 UNITS/HOUR
INSULIN ADMINSTERED, INSADM: 5.8 UNITS/HOUR
INSULIN ORDER, INSORD: 0 UNITS/HOUR
INSULIN ORDER, INSORD: 0.3 UNITS/HOUR
INSULIN ORDER, INSORD: 3 UNITS/HOUR
INSULIN ORDER, INSORD: 3.3 UNITS/HOUR
INSULIN ORDER, INSORD: 3.4 UNITS/HOUR
INSULIN ORDER, INSORD: 3.5 UNITS/HOUR
INSULIN ORDER, INSORD: 4.9 UNITS/HOUR
INSULIN ORDER, INSORD: 5.8 UNITS/HOUR
IONIZED CALCIUM ISTA,ICAI: 1.24
KETONES UR QL STRIP.AUTO: 15 MG/DL
LEUKOCYTE ESTERASE UR QL STRIP.AUTO: NEGATIVE
LIPASE SERPL-CCNC: 137 U/L (ref 73–393)
LOW TARGET, LOT: 150 MG/DL
MAGNESIUM SERPL-MCNC: 2.7 MG/DL (ref 1.6–2.4)
MCH RBC QN AUTO: 31.2 PG (ref 26–34)
MCHC RBC AUTO-ENTMCNC: 33.5 G/DL (ref 30–36.5)
MCV RBC AUTO: 93.2 FL (ref 80–99)
MINUTES UNTIL NEXT BG, NBG: 120 MIN
MINUTES UNTIL NEXT BG, NBG: 15 MIN
MINUTES UNTIL NEXT BG, NBG: 60 MIN
MULTIPLIER, MUL: 0.01
MULTIPLIER, MUL: 0.01
MULTIPLIER, MUL: 0.02
MULTIPLIER, MUL: 0.03
NITRITE UR QL STRIP.AUTO: NEGATIVE
NRBC # BLD: 0 K/UL (ref 0–0.01)
NRBC BLD-RTO: 0 PER 100 WBC
ORDER INITIALS, ORDINIT: NORMAL
PH UR STRIP: 5 [PH] (ref 5–8)
PLATELET # BLD AUTO: 255 K/UL (ref 150–400)
PMV BLD AUTO: 10.8 FL (ref 8.9–12.9)
POTASSIUM BLD-SCNC: 6 MMOL/L
POTASSIUM SERPL-SCNC: 3.8 MMOL/L (ref 3.5–5.1)
POTASSIUM SERPL-SCNC: 4.4 MMOL/L (ref 3.5–5.1)
POTASSIUM SERPL-SCNC: 5.1 MMOL/L (ref 3.5–5.1)
PROT SERPL-MCNC: 7.7 G/DL (ref 6.4–8.2)
PROT UR STRIP-MCNC: NEGATIVE MG/DL
RBC # BLD AUTO: 4.55 M/UL (ref 4.1–5.7)
RBC #/AREA URNS HPF: ABNORMAL /HPF (ref 0–5)
SERVICE CMNT-IMP: ABNORMAL
SERVICE CMNT-IMP: NORMAL
SODIUM BLD-SCNC: 135 MMOL/L
SODIUM SERPL-SCNC: 135 MMOL/L (ref 136–145)
SODIUM SERPL-SCNC: 140 MMOL/L (ref 136–145)
SODIUM SERPL-SCNC: 144 MMOL/L (ref 136–145)
SP GR UR REFRACTOMETRY: 1.02 (ref 1–1.03)
UA: UC IF INDICATED,UAUC: ABNORMAL
UROBILINOGEN UR QL STRIP.AUTO: 0.2 EU/DL (ref 0.2–1)
WBC # BLD AUTO: 9.6 K/UL (ref 4.1–11.1)
WBC URNS QL MICRO: ABNORMAL /HPF (ref 0–4)

## 2018-07-06 PROCEDURE — 74011636637 HC RX REV CODE- 636/637: Performed by: EMERGENCY MEDICINE

## 2018-07-06 PROCEDURE — 83036 HEMOGLOBIN GLYCOSYLATED A1C: CPT | Performed by: EMERGENCY MEDICINE

## 2018-07-06 PROCEDURE — 74011000258 HC RX REV CODE- 258: Performed by: EMERGENCY MEDICINE

## 2018-07-06 PROCEDURE — 74011250636 HC RX REV CODE- 250/636: Performed by: EMERGENCY MEDICINE

## 2018-07-06 PROCEDURE — 96361 HYDRATE IV INFUSION ADD-ON: CPT

## 2018-07-06 PROCEDURE — 81001 URINALYSIS AUTO W/SCOPE: CPT | Performed by: EMERGENCY MEDICINE

## 2018-07-06 PROCEDURE — 82803 BLOOD GASES ANY COMBINATION: CPT | Performed by: EMERGENCY MEDICINE

## 2018-07-06 PROCEDURE — 65660000000 HC RM CCU STEPDOWN

## 2018-07-06 PROCEDURE — 96374 THER/PROPH/DIAG INJ IV PUSH: CPT

## 2018-07-06 PROCEDURE — 85027 COMPLETE CBC AUTOMATED: CPT | Performed by: EMERGENCY MEDICINE

## 2018-07-06 PROCEDURE — 83735 ASSAY OF MAGNESIUM: CPT | Performed by: EMERGENCY MEDICINE

## 2018-07-06 PROCEDURE — 74011000250 HC RX REV CODE- 250: Performed by: EMERGENCY MEDICINE

## 2018-07-06 PROCEDURE — 80048 BASIC METABOLIC PNL TOTAL CA: CPT

## 2018-07-06 PROCEDURE — 82962 GLUCOSE BLOOD TEST: CPT

## 2018-07-06 PROCEDURE — 99285 EMERGENCY DEPT VISIT HI MDM: CPT

## 2018-07-06 PROCEDURE — 74011250637 HC RX REV CODE- 250/637: Performed by: EMERGENCY MEDICINE

## 2018-07-06 PROCEDURE — 83690 ASSAY OF LIPASE: CPT

## 2018-07-06 PROCEDURE — 80053 COMPREHEN METABOLIC PANEL: CPT | Performed by: EMERGENCY MEDICINE

## 2018-07-06 PROCEDURE — 36415 COLL VENOUS BLD VENIPUNCTURE: CPT

## 2018-07-06 RX ORDER — ACETAMINOPHEN 325 MG/1
650 TABLET ORAL
Status: DISCONTINUED | OUTPATIENT
Start: 2018-07-06 | End: 2018-07-07 | Stop reason: HOSPADM

## 2018-07-06 RX ORDER — DEXTROSE 50 % IN WATER (D50W) INTRAVENOUS SYRINGE
12.5-25 AS NEEDED
Status: DISCONTINUED | OUTPATIENT
Start: 2018-07-06 | End: 2018-07-07

## 2018-07-06 RX ORDER — ONDANSETRON 4 MG/1
4 TABLET, ORALLY DISINTEGRATING ORAL
Qty: 1 TAB | Refills: 0 | Status: SHIPPED | COMMUNITY
Start: 2018-07-06 | End: 2018-07-06

## 2018-07-06 RX ORDER — SODIUM CHLORIDE 0.9 % (FLUSH) 0.9 %
5-10 SYRINGE (ML) INJECTION EVERY 8 HOURS
Status: DISCONTINUED | OUTPATIENT
Start: 2018-07-06 | End: 2018-07-07 | Stop reason: HOSPADM

## 2018-07-06 RX ORDER — SODIUM CHLORIDE 0.9 % (FLUSH) 0.9 %
5-10 SYRINGE (ML) INJECTION AS NEEDED
Status: DISCONTINUED | OUTPATIENT
Start: 2018-07-06 | End: 2018-07-07 | Stop reason: HOSPADM

## 2018-07-06 RX ORDER — SODIUM CHLORIDE 9 MG/ML
75 INJECTION, SOLUTION INTRAVENOUS CONTINUOUS
Status: DISCONTINUED | OUTPATIENT
Start: 2018-07-06 | End: 2018-07-07 | Stop reason: HOSPADM

## 2018-07-06 RX ORDER — ONDANSETRON 2 MG/ML
4 INJECTION INTRAMUSCULAR; INTRAVENOUS
Status: DISCONTINUED | OUTPATIENT
Start: 2018-07-06 | End: 2018-07-07 | Stop reason: HOSPADM

## 2018-07-06 RX ORDER — ATORVASTATIN CALCIUM 40 MG/1
80 TABLET, FILM COATED ORAL
Status: DISCONTINUED | OUTPATIENT
Start: 2018-07-06 | End: 2018-07-07 | Stop reason: HOSPADM

## 2018-07-06 RX ORDER — AMLODIPINE BESYLATE 5 MG/1
5 TABLET ORAL DAILY
Status: DISCONTINUED | OUTPATIENT
Start: 2018-07-07 | End: 2018-07-07 | Stop reason: HOSPADM

## 2018-07-06 RX ORDER — DEXTROSE MONOHYDRATE AND SODIUM CHLORIDE 5; .9 G/100ML; G/100ML
125 INJECTION, SOLUTION INTRAVENOUS CONTINUOUS
Status: DISCONTINUED | OUTPATIENT
Start: 2018-07-06 | End: 2018-07-07

## 2018-07-06 RX ORDER — MAGNESIUM SULFATE 100 %
4 CRYSTALS MISCELLANEOUS AS NEEDED
Status: DISCONTINUED | OUTPATIENT
Start: 2018-07-06 | End: 2018-07-07 | Stop reason: HOSPADM

## 2018-07-06 RX ORDER — DOXAZOSIN 2 MG/1
1 TABLET ORAL
Status: DISCONTINUED | OUTPATIENT
Start: 2018-07-06 | End: 2018-07-07 | Stop reason: HOSPADM

## 2018-07-06 RX ORDER — NAPROXEN SODIUM 220 MG
440 TABLET ORAL
COMMUNITY
End: 2018-07-07

## 2018-07-06 RX ORDER — INSULIN LISPRO 100 [IU]/ML
INJECTION, SOLUTION INTRAVENOUS; SUBCUTANEOUS
Status: DISCONTINUED | OUTPATIENT
Start: 2018-07-06 | End: 2018-07-07

## 2018-07-06 RX ORDER — HEPARIN SODIUM 5000 [USP'U]/ML
5000 INJECTION, SOLUTION INTRAVENOUS; SUBCUTANEOUS EVERY 8 HOURS
Status: DISCONTINUED | OUTPATIENT
Start: 2018-07-06 | End: 2018-07-07 | Stop reason: HOSPADM

## 2018-07-06 RX ADMIN — Medication 10 ML: at 23:08

## 2018-07-06 RX ADMIN — ONDANSETRON 4 MG: 2 INJECTION, SOLUTION INTRAMUSCULAR; INTRAVENOUS at 21:21

## 2018-07-06 RX ADMIN — SODIUM CHLORIDE 1000 ML: 900 INJECTION, SOLUTION INTRAVENOUS at 12:10

## 2018-07-06 RX ADMIN — SODIUM CHLORIDE 3.3 UNITS/HR: 900 INJECTION, SOLUTION INTRAVENOUS at 16:26

## 2018-07-06 RX ADMIN — DEXTROSE MONOHYDRATE AND SODIUM CHLORIDE 125 ML/HR: 5; .9 INJECTION, SOLUTION INTRAVENOUS at 16:26

## 2018-07-06 RX ADMIN — DOXAZOSIN 1 MG: 2 TABLET ORAL at 21:18

## 2018-07-06 RX ADMIN — ATORVASTATIN CALCIUM 80 MG: 40 TABLET, FILM COATED ORAL at 21:18

## 2018-07-06 RX ADMIN — HEPARIN SODIUM 5000 UNITS: 5000 INJECTION, SOLUTION INTRAVENOUS; SUBCUTANEOUS at 23:07

## 2018-07-06 RX ADMIN — HEPARIN SODIUM 5000 UNITS: 5000 INJECTION, SOLUTION INTRAVENOUS; SUBCUTANEOUS at 17:26

## 2018-07-06 RX ADMIN — DEXTROSE MONOHYDRATE 25 G: 500 INJECTION PARENTERAL at 23:06

## 2018-07-06 RX ADMIN — INSULIN HUMAN 10 UNITS: 100 INJECTION, SOLUTION PARENTERAL at 13:12

## 2018-07-06 NOTE — H&P
Hospitalist Admission Note    NAME: Celia Murrieta   :  1960   MRN:  [id number]     Date/Time:  2018 4:06 PM    Patient PCP: Jorge Dixon MD  ______________________________________________________________________  Given the patient's current clinical presentation, I have a high level of concern for decompensation if discharged from the emergency department. Complex decision making was performed, which includes reviewing the patient's available past medical records, laboratory results, and x-ray films. My assessment of this patient's clinical condition and my plan of care is as follows. Assessment / Plan:    Type 1 diabetes with hyperglycemia and reported DKA while the urgent care just prior to arrival  Acute kidney injury, suspect prerenal azotemia  Hyperkalemia  -Since patient has not been able to eat and drink much some nausea and the reported anion gap of 19 although now normalized after liter of fluids, would favor continuing him on the insulin drip until we can make sure he is able to take good p.o.'s and repeating another BMP and electrolytes. -We will continue IV hydration and monitor his creatinine  -We will get postvoid residual make sure that he is voiding okay  -if his creatinine is not improving will need imaging such as an ultrasound to evaluate further and consider nephrology if needed  -Patient denies any respiratory symptoms and his urinalysis does not appear to be infected no focal symptoms or signs of infection but monitor closely.  He has a normal white count and no fever blood pressures are stable  -We will transition to subcu insulin once he is tolerating p.o.'s and we assure he is not had a recurrent  -We will have diabetes management to evaluate him in the morning  -We will check a lipase to make sure that is not contributing to his nausea his LFTs however okay he has no abdominal pain on exam.    Hypertension-we will continue his Norvasc and adjust his meds as needed    Hyperlipidemia we will continue his statin    Acute on chronic kidney disease stage III-see above    History of left adrenal adenoma status post adrenalectomy    CODE STATUS-he does not have a living will he would want his wife to make decisions for him and currently would like to be a full code. DVT prophylaxis-will be on heparin subcu and ambulate as tolerated    Patient's care will be transitioned to Dr. Remi saldivar, his primary care physician in the a.m. who will take over his care. Thank you for allowing us to participate in this patient's care    Baseline: independent      Subjective:   CHIEF COMPLAINT:Generalized weakness, poor appetite and elevated blood sugars since Monday    HISTORY OF PRESENT ILLNESS:     Genaro Willoughby is a 59 y.o. man with history of type 1 diabetes, hypertension, hyperlipidemia and chronic kidney disease stage III who was in his usual state of health until Monday. He says he denies having been sick or any changes in his medications prior to that other than about a month ago he switched from [id number] insulin. He reports on Monday his sugars have been in the 400 range and has been feeling weak all over nothing focal. He has had no appetite and occasionally has had nausea with some dry heaving but not much coming up. He has not had any abdominal pain no chest pain or shortness of breath no coughing he has not been lightheaded or dizzy no focal weakness numbness or tingling. He has not noticed any urinary symptoms. She has noticed that in the mornings he will sneeze multiple times but denies any congestion postnasal drip or cough.  He continued to take his insulin even though he was not eating very well and his sugars still remained high so today he went to an urgent care and apparently was found to have an elevated potassium in the 6 range with an anion gap of 19 and hyperglycemia he got a liter of fluids there and was transferred to the ER for further evaluation and management. In the ER here patient did not have an anion gap his potassium was down to 5.1 did continue to have some acute kidney injury no sign of infection his urinalysis but some ketones were present and we were asked to admit for further evaluation and management he was started on an insulin drip. Patient currently denies any nausea he is not sure if he is can be able to eat but would like to try. Review of systems: Complete review of systems was done except as noted above in H&P was negative. We were asked to admit for work up and evaluation of the above problems.      Past Medical History:   Diagnosis Date    [facility]F [facility]acute renal failure) ([facility] [address] 1/11/2013    Arthritis     knees    CKD stage 3 due to type 2 diabetes mellitus ([facility] [address]     Diabetes ([facility] [address] 1982    Type 1    DKA (diabetic ketoacidoses) ([facility] [address]     Hyperaldosteronism ([facility] [address]     s/p surgery    Hypercholesterolemia 11/30/2010    Hypertension 11/30/2010    Ventricular bigeminy 6/29/2011        Past Surgical History:   Procedure Laterality Date    COLONOSCOPY  4-09    kat- normal    ENDOSCOPY, COLON, DIAGNOSTIC  2009    Dr Wild Sauer EXCISE ADRENAL GLAND  [id number]    left adenoma removed for hyperaldosteronism    HX BUNIONECTOMY Right     HX CATARACT REMOVAL Right     HX HEENT      laser surgery left eye       Social History   Substance Use Topics    Smoking status: Never Smoker    Smokeless tobacco: Never Used    Alcohol use 0.0 oz/week     0 Standard drinks or equivalent per week      Comment: may have beer once a week or mixed drink 2 times a month        Family History   Problem Relation Age of Onset    Hypertension Father     Diabetes Father      Type 2    Diabetes Brother      Type 2   Social history patient lives with his wife no history of tobacco occasional alcohol or drug use    Family history his mother is alive and healthy with some arthritis his father had diabetes at one point but is no longer medications he is not really sure about his medical issues  Allergies   Allergen Reactions    Benazepril Other (comments)     hyperkalemia        Prior to Admission medications    Medication Sig Start Date End Date Taking? Authorizing Provider   insulin lispro protamine/insulin lispro (HUMALOG MIX 75-25,U-100,INSULN) 100 unit/mL (75-25) injection 30 Units by SubCUTAneous route daily. Yes Historical Provider   insulin lispro protamine/insulin lispro (HUMALOG MIX 75-25,U-100,INSULN) 100 unit/mL (75-25) injection 20 Units by SubCUTAneous route every evening. Yes Historical Provider   insulin lispro protamine/insulin lispro (HUMALOG MIX 75-25,U-100,INSULN) 100 unit/mL (75-25) injection by SubCUTAneous route See Admin Instructions. Patient also does a sliding scale, but was not sure of the exact scale. Yes Historical Provider   naproxen sodium (ALEVE) 220 mg tablet Take 440 mg by mouth every eight (8) hours as needed ('Knee Pain'). Yes Historical Provider   amLODIPine (NORVASC) 5 mg tablet TAKE 2 TABLETS BY MOUTH DAILY 4/9/18  Yes Zackery Carlos MD   CIALIS 20 mg tablet TAKE 1 TABLET BY MOUTH 1 HOUR PRIOR TO SEX ON AN EMPTY STOMACH -Do NOT take if you are currently taking nitrates. 4/9/18  Yes Zackery Carlos MD   benazepril (LOTENSIN) 20 mg tablet Take 1 Tab by mouth daily. 12/4/17  Yes Gt Skinner MD   hydroCHLOROthiazide (HYDRODIURIL) 12.5 mg tablet Take 1 Tab by mouth daily. For blood pressure 9/27/17  Yes Zackery Carlos MD   atorvastatin (LIPITOR) 80 mg tablet TAKE 1 TABLET BY MOUTH EVERY DAY 4/21/17  Yes Zackery Carlos MD   doxazosin (CARDURA) 2 mg tablet Take 0.5 Tabs by mouth nightly.  3/27/17  Yes Zackery Carlos MD     See above  REVIEW OF SYSTEMS:         Objective:   VITALS:    Visit Vitals    /71    Pulse 75    Temp 98 °F (36.7 °C)    Resp 14    Ht 5' 8\" (1.727 m)    Wt 72 kg (158 lb 11.7 oz)    SpO2 100%    BMI 24.14 kg/m2       PHYSICAL EXAM:    Patient is awake and alert nontoxic-appearing oriented ×3 no distress on room air. Extraocular movements are intact sclera nonicteric conjunctiva are pink oropharynx mucous membranes are tacky without thrush or lesions neck is supple nontender no lymphadenopathy no JVD no carotid bruits no thyromegaly nodules or patient cardiovascular regular rate and obvious murmurs rubs or gallops lungs are clear without wheezes rhonchi or crackles. Abdomen bowel sounds present soft nontender no hepatosplenomegaly secretion of bladder distention or tenderness. Extremities no clubbing cyanosis or edema neuro exam is nonfocal skin exam no rashes or lesions of decubitus ulcers are present on admission. _______________________________________________________________________  Care Plan discussed with:    Comments   Patient y    Family      RN y    Care Manager                    Consultant:      _______________________________________________________________________  Expected  Disposition:   Home with Family    HH/PT/OT/RN    SNF/LTC    SHARON    ________________________________________________________________________  TOTAL TIME:   Minutes    Critical Care Provided     Minutes non procedure based      Comments     Reviewed previous records   >50% of visit spent in counseling and coordination of care  Discussion with patient and/or family and questions answered       ________________________________________________________________________  Signed: Maddie Kern MD    Procedures: see electronic medical records for all procedures/Xrays and details which were not copied into this note but were reviewed prior to creation of Plan.     LAB DATA REVIEWED:    Recent Results (from the past 24 hour(s))   AMB POC GLUCOSE BLOOD, BY GLUCOSE MONITORING DEVICE    Collection Time: 07/06/18 11:03 AM   Result Value Ref Range    Glucose POC hhh mg/dL   AMB POC ISTAT CHEM 8+    Collection Time: 07/06/18 11:05 AM   Result Value Ref Range    Sodium,  MMOL/L    Potassium, POC 6. 0 MMOL/L    Chloride, POC 99 MMOL/L    Calcium, ionized (POC) 1.24     CO2, POC 24 MMOL/L    Glucose,  MG/DL    BUN, POC 56 MG/DL    Creatinine, POC 2.0 (A) 0.6 - 1.3 MG/DL    Hematocrit, POC 53 %    Hemoglobin, POC  G/DL    Anion gap, POC 19 MMOL/L   URINALYSIS W/ REFLEX CULTURE    Collection Time: 07/06/18  1:01 PM   Result Value Ref Range    Color YELLOW/STRAW      Appearance CLEAR CLEAR      Specific gravity 1.022 1.003 - 1.030      pH (UA) 5.0 5.0 - 8.0      Protein NEGATIVE  NEG mg/dL    Glucose >1000 (A) NEG mg/dL    Ketone 15 (A) NEG mg/dL    Bilirubin NEGATIVE  NEG      Blood NEGATIVE  NEG      Urobilinogen 0.2 0.2 - 1.0 EU/dL    Nitrites NEGATIVE  NEG      Leukocyte Esterase NEGATIVE  NEG      WBC 0-4 0 - 4 /hpf    RBC 0-5 0 - 5 /hpf    Epithelial cells FEW FEW /lpf    Bacteria NEGATIVE  NEG /hpf    UA:UC IF INDICATED CULTURE NOT INDICATED BY UA RESULT CNI      Hyaline cast 0-2 0 - 5 /lpf   CBC W/O DIFF    Collection Time: 07/06/18  1:01 PM   Result Value Ref Range    WBC 9.6 4.1 - 11.1 K/uL    RBC 4.55 4.10 - 5.70 M/uL    HGB 14.2 12.1 - 17.0 g/dL    HCT 42.4 36.6 - 50.3 %    MCV 93.2 80.0 - 99.0 FL    MCH 31.2 26.0 - 34.0 PG    MCHC 33.5 30.0 - 36.5 g/dL    RDW 12.0 11.5 - 14.5 %    PLATELET 745 516 - 387 K/uL    MPV 10.8 8.9 - 12.9 FL    NRBC 0.0 0  WBC    ABSOLUTE NRBC 0.00 0.00 - 8.80 K/uL   METABOLIC PANEL, COMPREHENSIVE    Collection Time: 07/06/18  1:01 PM   Result Value Ref Range    Sodium 135 (L) 136 - 145 mmol/L    Potassium 5.1 3.5 - 5.1 mmol/L    Chloride 101 97 - 108 mmol/L    CO2 25 21 - 32 mmol/L    Anion gap 9 5 - 15 mmol/L    Glucose 439 (H) 65 - 100 mg/dL    BUN 57 (H) 6 - 20 MG/DL    Creatinine 2.32 (H) 0.70 - 1.30 MG/DL    BUN/Creatinine ratio 25 (H) 12 - 20      GFR est AA 35 (L) >60 ml/min/1.73m2    GFR est non-AA 29 (L) >60 ml/min/1.73m2    Calcium 8.8 8.5 - 10.1 MG/DL    Bilirubin, total 0.5 0.2 - 1.0 MG/DL    ALT (SGPT) 27 12 - 78 U/L    AST (SGOT) 10 (L) 15 - 37 U/L    Alk.  phosphatase 111 45 - 117 U/L    Protein, total 7.7 6.4 - 8.2 g/dL    Albumin 3.6 3.5 - 5.0 g/dL    Globulin 4.1 (H) 2.0 - 4.0 g/dL    A-G Ratio 0.9 (L) 1.1 - 2.2     VENOUS BLOOD GAS    Collection Time: 07/06/18  1:01 PM   Result Value Ref Range    VENOUS PH 7.27 (L) 7.32 - 7.42      VENOUS PCO2 50 41 - 51 mmHg    VENOUS PO2 26 25 - 40 mmHg    VENOUS O2 SATURATION PENDING %    VENOUS BICARBONATE 23 23 - 28 mmol/L    VENOUS BASE DEFICIT 4.8 mmol/L    O2 METHOD ROOM AIR      SPONTANEOUS RATE 18.0      Sample source VENOUS      SITE OTHER     MAGNESIUM    Collection Time: 07/06/18  1:01 PM   Result Value Ref Range    Magnesium 2.7 (H) 1.6 - 2.4 mg/dL   GLUCOSE, POC    Collection Time: 07/06/18  1:54 PM   Result Value Ref Range    Glucose (POC) 301 (H) 65 - 100 mg/dL    Performed by Gray Puente    GLUCOSE, POC    Collection Time: 07/06/18  3:12 PM   Result Value Ref Range    Glucose (POC) 278 (H) 65 - 100 mg/dL    Performed by Luisa Martino    GLUCOSE, POC    Collection Time: 07/06/18  3:49 PM   Result Value Ref Range    Glucose (POC) 227 (H) 65 - 100 mg/dL    Performed by Yuliana Martínez    Collection Time: 07/06/18  3:50 PM   Result Value Ref Range    Glucose 227 mg/dL    Insulin order 3.3 units/hour    Insulin adminstered 3.3 units/hour    Multiplier 0.020     Low target 150 mg/dL    High target 250 mg/dL    D50 order 0.0 ml    D50 administered 0.00 ml    Minutes until next BG 60 min    Order initials [address]     Administered initials [address]     GLSCOM Comments

## 2018-07-06 NOTE — ED NOTES
Assumed care of patient, pt changed into gown, placed on monitor times three, iv in place and fluids infusing, Dr Fausto Ralph bedside evaluating patientr

## 2018-07-06 NOTE — PATIENT INSTRUCTIONS
Referred to [id number] [facility] via Ambulance  Possible DKA

## 2018-07-06 NOTE — ED NOTES
TRANSFER - OUT REPORT:    Verbal report given to RN (name) on Franc Knee  being transferred to PCU (unit) for routine progression of care       Report consisted of patients Situation, Background, Assessment and   Recommendations(SBAR). Information from the following report(s) SBAR and ED Summary was reviewed with the receiving nurse. Lines:   Peripheral IV 07/06/18 Right Antecubital (Active)       Peripheral IV 07/06/18 Left Forearm (Active)   Site Assessment Clean, dry, & intact 7/6/2018  1:05 PM   Phlebitis Assessment 0 7/6/2018  1:05 PM   Infiltration Assessment 0 7/6/2018  1:05 PM   Dressing Status Clean, dry, & intact 7/6/2018  1:05 PM   Dressing Type Transparent 7/6/2018  1:05 PM        Opportunity for questions and clarification was provided.       Patient transported with:   [employer]

## 2018-07-06 NOTE — PROGRESS NOTES
Pharmacy Clarification of Prior to Admission Medication Regimen     The patient was interviewed regarding clarification of the prior to admission medication regimen. Patient's wife was present in room and obtained permission from patient to discuss drug regimen with visitor(s) present. Patient was questioned regarding use of any other inhalers, topical products, over the counter medications, herbal medications, vitamin products or ophthalmic/nasal/otic medication use. Information Obtained From: Patient and Rx Query    Pertinent Pharmacy Findings:   insulin lispro protamine/insulin lispro (HUMALOG MIX 75-25,U-100,INSULN) 100 unit/mL (75-25) injection: Patient stated that he bases the amount of insulin he takes on his blood sugar reading. Patient stated that he took \"40-42 units this morning due to sugar being about 459. \" Patient stated that he also has a sliding scale, but was not sure of the exact scale. Patient stated, \"If I have potatoes, I might take 2 units more than the normal dose.'    PTA medication list was corrected to the following:     Prior to Admission Medications   Prescriptions Last Dose Informant Patient Reported? Taking? CIALIS 20 mg tablet 6/6/2018 at Unknown time Self No Yes   Sig: TAKE 1 TABLET BY MOUTH 1 HOUR PRIOR TO SEX ON AN EMPTY STOMACH -Do NOT take if you are currently taking nitrates. amLODIPine (NORVASC) 5 mg tablet 7/5/2018 at Unknown time Self No Yes   Sig: TAKE 2 TABLETS BY MOUTH DAILY   atorvastatin (LIPITOR) 80 mg tablet 7/5/2018 at Unknown time Self No Yes   Sig: TAKE 1 TABLET BY MOUTH EVERY DAY   benazepril (LOTENSIN) 20 mg tablet 7/5/2018 at Unknown time Self No Yes   Sig: Take 1 Tab by mouth daily. doxazosin (CARDURA) 2 mg tablet 7/5/2018 at Unknown time Self No Yes   Sig: Take 0.5 Tabs by mouth nightly. hydroCHLOROthiazide (HYDRODIURIL) 12.5 mg tablet 7/5/2018 at Unknown time Self No Yes   Sig: Take 1 Tab by mouth daily.  For blood pressure   insulin lispro protamine/insulin lispro (HUMALOG MIX 75-25,U-100,INSULN) 100 unit/mL (75-25) injection 2018 at Unknown time Self Yes Yes   Si Units by SubCUTAneous route daily. insulin lispro protamine/insulin lispro (HUMALOG MIX 75-25,U-100,INSULN) 100 unit/mL (75-25) injection 2018 at Unknown time Self Yes Yes   Si Units by SubCUTAneous route every evening. insulin lispro protamine/insulin lispro (HUMALOG MIX 75-25,U-100,INSULN) 100 unit/mL (75-25) injection 2018 at Unknown time Self Yes Yes   Sig: by SubCUTAneous route See Admin Instructions. Patient also does a sliding scale, but was not sure of the exact scale. naproxen sodium (ALEVE) 220 mg tablet 2018 at Unknown time Self Yes Yes   Sig: Take 440 mg by mouth every eight (8) hours as needed ('Knee Pain').       Facility-Administered Medications: None          Thank you,  Radha Rivera CPhT  Medication History Pharmacy Technician

## 2018-07-06 NOTE — ED PROVIDER NOTES
EMERGENCY DEPARTMENT HISTORY AND PHYSICAL EXAM      Date: 7/6/2018  Patient Name: Jory Gómez    History of Presenting Illness     Chief Complaint   Patient presents with    Referral / Consult     pt reports to ed via EMS from [facility] with elevated potassium and elevated blood sugar-- pt reports elevated sugars for couple days, pt states due to price he changed his insulin within the last month,     High Blood Sugar       History Provided By: Patient    HPI: Jory Gómez, 59 y.o. male with PMHx significant for Type I DM, HTN, CKD, presents via EMS to ED from [facility] for further evaluation of elevated potassium and high blood sugar. Pt reports increased fatigue, nausea, and decreased appetite x 3 days. He reports occasional vomiting. Pt reports decreased urine output. He reports that his blood sugar was over 400 today. Pt states his insulin doses are dependent upon his sugar level. He endorses taking 50 units at the highest with no improvement. Pt states he received Zofran at [facility] that relieved his nausea. He notes that he recently had to change his brand of insulin due to a change in price. He affirms that he has been in DKA before. Pt denies associated abd pain or blurry vision. He denies SOB, CP, or cough. Pt denies diarrhea. There are no other complaints, changes, or physical findings at this time.     PCP: Ankur Vang MD    Current Facility-Administered Medications   Medication Dose Route Frequency Provider Last Rate Last Dose    insulin regular (NOVOLIN R, HUMULIN R) 100 Units in 0.9% sodium chloride 100 mL infusion  0-50 Units/hr IntraVENous TITRATE Juan Pablo Chase MD        insulin lispro (HUMALOG) injection   SubCUTAneous TIDAC Juan Pablo Chase MD        glucose chewable tablet 16 g  4 Tab Oral PRN Juan Pablo Chase MD        dextrose (D50W) injection syrg 12.5-25 g  12.5-25 g IntraVENous PRN Juan Pablo Chase MD        glucagon (GLUCAGEN) injection 1 mg  1 mg IntraMUSCular PRN Priyanka Rivera America Shafer MD         Current Outpatient Prescriptions   Medication Sig Dispense Refill    ondansetron (ZOFRAN ODT) 4 mg disintegrating tablet Take 1 Tab by mouth now for 1 dose. 1 Tab 0    amLODIPine (NORVASC) 5 mg tablet TAKE 2 TABLETS BY MOUTH DAILY 180 Tab 3    CIALIS 20 mg tablet TAKE 1 TABLET BY MOUTH 1 HOUR PRIOR TO SEX ON AN EMPTY STOMACH -Do NOT take if you are currently taking nitrates. 5 Tab 5    Insulin Syringe-Needle U-100 (BD INSULIN SYRINGE ULTRA-FINE) 1 mL 30 gauge x 1/2\" syrg USE AS DIRECTED 200 Syringe 12    naproxen sodium (ALEVE) 220 mg cap Take 2 Caps by mouth every eight (8) hours as needed. 40 Cap 2    benazepril (LOTENSIN) 20 mg tablet Take 1 Tab by mouth daily. 30 Tab 11    hydroCHLOROthiazide (HYDRODIURIL) 12.5 mg tablet Take 1 Tab by mouth daily. For blood pressure 90 Tab 3    atorvastatin (LIPITOR) 80 mg tablet TAKE 1 TABLET BY MOUTH EVERY DAY 90 Tab 1    ONETOUCH ULTRA TEST strip USE AS DIRECTED 300 Strip 3    doxazosin (CARDURA) 2 mg tablet Take 0.5 Tabs by mouth nightly.  39 Tab 3    tadalafil (CIALIS) 20 mg tablet TAKE 1 TABLET BY MOUTH 1 HOUR PRIOR TO SEX ON EMPTY STOMACH 5 Tab 6       Past History     Past Medical History:  Past Medical History:   Diagnosis Date    [facility]acute renal [facility]) [facility] 1/11/2013    Arthritis     knees    CKD stage 3 due to type 2 diabetes mellitus ([facility]     Diabetes ([facility] 1982    Type 1    DKA (diabetic ketoacidoses) ([facility]     Hyperaldosteronism ([facility]     s/p surgery    Hypercholesterolemia 11/30/2010    Hypertension 11/30/2010    Ventricular bigeminy 6/29/2011       Past Surgical History:  Past Surgical History:   Procedure Laterality Date    COLONOSCOPY  4-09    kat- normal    ENDOSCOPY, COLON, DIAGNOSTIC  2009    Dr Filiberto Sandoval [facility] GLAND  [id number]    left adenoma removed for hyperaldosteronism    HX BUNIONECTOMY Right     HX CATARACT REMOVAL Right     HX HEENT      laser surgery left eye       Family History:  Family History   Problem Relation Age of Onset    Hypertension Father     Diabetes Father      Type 2    Diabetes Brother      Type 2       Social History:  Social History   Substance Use Topics    Smoking status: Never Smoker    Smokeless tobacco: Never Used    Alcohol use 0.0 oz/week     0 Standard drinks or equivalent per week      Comment: may have beer once a week or mixed drink 2 times a month       Allergies: Allergies   Allergen Reactions    Benazepril Other (comments)     hyperkalemia         Review of Systems   Review of Systems   Constitutional: Positive for appetite change (decrease) and fatigue. Negative for chills and fever. Eyes: Negative for visual disturbance. Respiratory: Negative for cough and shortness of breath. Cardiovascular: Negative for chest pain. Gastrointestinal: Positive for nausea and vomiting. Negative for abdominal pain, constipation and diarrhea. Genitourinary: Positive for decreased urine volume. Neurological: Negative for weakness and numbness. All other systems reviewed and are negative. Physical Exam   Physical Exam   Constitutional: He is oriented to person, place, and time. He appears well-developed and well-nourished. HENT:   Head: Normocephalic and atraumatic. Mouth/Throat: Mucous membranes are dry. Eyes: Conjunctivae and EOM are normal.   Neck: Normal range of motion. Neck supple. Cardiovascular: Normal rate and regular rhythm. Pulmonary/Chest: Effort normal and breath sounds normal. No respiratory distress. Abdominal: Soft. He exhibits no distension. There is no tenderness. Musculoskeletal: Normal range of motion. Neurological: He is alert and oriented to person, place, and time. Skin: Skin is warm and dry. Psychiatric: He has a normal mood and affect. Nursing note and vitals reviewed.       Diagnostic Study Results     Labs -     Recent Results (from the past 12 hour(s))   AMB POC GLUCOSE BLOOD, BY GLUCOSE MONITORING DEVICE    Collection Time: 07/06/18 11:03 AM   Result Value Ref Range    Glucose POC hhh mg/dL   AMB POC ISTAT CHEM 8+    Collection Time: 07/06/18 11:05 AM   Result Value Ref Range    Sodium,  MMOL/L    Potassium, POC 6.0 MMOL/L    Chloride, POC 99 MMOL/L    Calcium, ionized (POC) 1.24     CO2, POC 24 MMOL/L    Glucose,  MG/DL    BUN, POC 56 MG/DL    Creatinine, POC 2.0 (A) 0.6 - 1.3 MG/DL    Hematocrit, POC 53 %    Hemoglobin, POC  G/DL    Anion gap, POC 19 MMOL/L   URINALYSIS W/ REFLEX CULTURE    Collection Time: 07/06/18  1:01 PM   Result Value Ref Range    Color YELLOW/STRAW      Appearance CLEAR CLEAR      Specific gravity 1.022 1.003 - 1.030      pH (UA) 5.0 5.0 - 8.0      Protein NEGATIVE  NEG mg/dL    Glucose >1000 (A) NEG mg/dL    Ketone 15 (A) NEG mg/dL    Bilirubin NEGATIVE  NEG      Blood NEGATIVE  NEG      Urobilinogen 0.2 0.2 - 1.0 EU/dL    Nitrites NEGATIVE  NEG      Leukocyte Esterase NEGATIVE  NEG      WBC 0-4 0 - 4 /hpf    RBC 0-5 0 - 5 /hpf    Epithelial cells FEW FEW /lpf    Bacteria NEGATIVE  NEG /hpf    UA:UC IF INDICATED CULTURE NOT INDICATED BY UA RESULT CNI      Hyaline cast 0-2 0 - 5 /lpf   CBC W/O DIFF    Collection Time: 07/06/18  1:01 PM   Result Value Ref Range    WBC 9.6 4.1 - 11.1 K/uL    RBC 4.55 4.10 - 5.70 M/uL    HGB 14.2 12.1 - 17.0 g/dL    HCT 42.4 36.6 - 50.3 %    MCV 93.2 80.0 - 99.0 FL    MCH 31.2 26.0 - 34.0 PG    MCHC 33.5 30.0 - 36.5 g/dL    RDW 12.0 11.5 - 14.5 %    PLATELET 402 026 - 281 K/uL    MPV 10.8 8.9 - 12.9 FL    NRBC 0.0 0  WBC    ABSOLUTE NRBC 0.00 0.00 - 2.49 K/uL   METABOLIC PANEL, COMPREHENSIVE    Collection Time: 07/06/18  1:01 PM   Result Value Ref Range    Sodium 135 (L) 136 - 145 mmol/L    Potassium 5.1 3.5 - 5.1 mmol/L    Chloride 101 97 - 108 mmol/L    CO2 25 21 - 32 mmol/L    Anion gap 9 5 - 15 mmol/L    Glucose 439 (H) 65 - 100 mg/dL    BUN 57 (H) 6 - 20 MG/DL    Creatinine 2.32 (H) 0.70 - 1.30 MG/DL    BUN/Creatinine ratio 25 (H) 12 - 20      GFR est AA 35 (L) >60 ml/min/1.73m2    GFR est non-AA 29 (L) >60 ml/min/1.73m2    Calcium 8.8 8.5 - 10.1 MG/DL    Bilirubin, total 0.5 0.2 - 1.0 MG/DL    ALT (SGPT) 27 12 - 78 U/L    AST (SGOT) 10 (L) 15 - 37 U/L    Alk. phosphatase 111 45 - 117 U/L    Protein, total 7.7 6.4 - 8.2 g/dL    Albumin 3.6 3.5 - 5.0 g/dL    Globulin 4.1 (H) 2.0 - 4.0 g/dL    A-G Ratio 0.9 (L) 1.1 - 2.2     VENOUS BLOOD GAS    Collection Time: 07/06/18  1:01 PM   Result Value Ref Range    VENOUS PH 7.27 (L) 7.32 - 7.42      VENOUS PCO2 50 41 - 51 mmHg    VENOUS PO2 26 25 - 40 mmHg    VENOUS O2 SATURATION PENDING %    VENOUS BICARBONATE 23 23 - 28 mmol/L    VENOUS BASE DEFICIT 4.8 mmol/L    O2 METHOD ROOM AIR      SPONTANEOUS RATE 18.0      Sample source VENOUS      SITE OTHER     MAGNESIUM    Collection Time: 07/06/18  1:01 PM   Result Value Ref Range    Magnesium 2.7 (H) 1.6 - 2.4 mg/dL   GLUCOSE, POC    Collection Time: 07/06/18  1:54 PM   Result Value Ref Range    Glucose (POC) 301 (H) 65 - 100 mg/dL    Performed by [facility]        Medical Decision Making   I am the first provider for this patient. I reviewed the vital signs, available nursing notes, past medical history, past surgical history, family history and social history. Vital Signs-Reviewed the patient's vital signs. Patient Vitals for the past 12 hrs:   Temp Pulse Resp BP SpO2   07/06/18 1345 - 75 14 150/71 100 %   07/06/18 1208 98 °F (36.7 °C) 71 16 150/86 100 %     EKG interpretation: (Preliminary) 11:19  Rhythm: normal sinus rhythm; and regular . Rate (approx.): 77; AK interval: 134; QRS interval: 80  EKG done at [facility] 106. Written by STORM Canales, as dictated by Guillaume Hillside, MD    Records Reviewed: Old Medical Records    Provider Notes (Medical Decision Making):   Patient presents with hyperglycemia. DDx: uncontrolled diabetes 2/2 noncompliance, infection, stressors.   Will obtain labs to r/o DKA or other metabolic anomalies, treat with insulin and fluids. Pt reportedly had AG of 19 in the office with normal bicarb. Received 1L NS in route by EMS    ED Course:   Initial assessment performed. The patients presenting problems have been discussed, and they are in agreement with the care plan formulated and outlined with them. I have encouraged them to ask questions as they arise throughout their visit. 1:55 PM  Pt's blood sugar now 301.     2:30 PM  Pt's anion gap normal which might be due to the fluids he got prior to arrival. However, pt has ketones in his urine and a pH of 7.27. Consult Note:  2:00 PM  Max العراقي MD spoke with Suyapa Lockhart MD  Specialty: Hospitalist  Discussed pt's hx, disposition, and available diagnostic and imaging results. Dr. Dewayne Pack will admit pt. Critical Care Time:   1:28 PM    IMPENDING DETERIORATION -Metabolic  ASSOCIATED RISK FACTORS - Metabolic changes  MANAGEMENT- Bedside Assessment and Supervision of Care  INTERPRETATION -  Blood Pressure and Cardiac Output Measures   INTERVENTIONS - Metobolic interventions  CASE REVIEW - Hospitalist, Nursing and Family  TREATMENT RESPONSE -Improved  PERFORMED BY - Self    NOTES   :    I have spent 40 minutes of critical care time involved in lab review, consultations with specialist, family decision- making, bedside attention and documentation. During this entire length of time I was immediately available to the patient . Disposition:  Admit Note:  2:05 PM  Pt is being admitted by Dr. Dewayne Pack. The results of their tests and reason(s) for their admission have been discussed with pt and/or available family. They convey agreement and understanding for the need to be admitted and for admission diagnosis. Diagnosis     Clinical Impression:   1. Type 1 diabetes mellitus with ketoacidosis without coma ([facility]        Attestations:     This note is prepared by Reza Case, acting as a Scribe for MD Alysa Walls Ori Baxter MD: The scribe's documentation has been prepared under my direction and personally reviewed by me in its entirety. I confirm that the notes above accurately reflects all work, treatment, procedures, and medical decision making performed by me.

## 2018-07-06 NOTE — ED NOTES
Bedside shift change report given to Med De La Paz RN  (oncoming nurse) by Indy García RN  (offgoing nurse). Report included the following information SBAR, Kardex and ED Summary.      Blood glucose check --278

## 2018-07-06 NOTE — IP AVS SNAPSHOT
[address] [facility]. 
[phone] Patient: Veronica Elena MRN: [id number] AEH:[id number] About your hospitalization You were admitted on:  July 6, 2018 You last received care in the:  [facility] 2 PROGRESSIVE CARE You were discharged on:  July 7, 2018 Why you were hospitalized Your primary diagnosis was:  Not on File Your diagnoses also included:  Hyperglycemia, Eleazar (Acute Kidney Injury) (Hcc), Type 2 Diabetes With Nephropathy (Hcc), Diabetic Ketoacidosis Without Coma Associated With Type 1 Diabetes Mellitus (Hcc) Follow-up Information Follow up With Details Comments Contact Info Lizbeth Corbett MD Schedule an appointment as soon as possible for a visit in 5 days hospital follow-up [address] [place] [id number] [phone] Your Scheduled Appointments Monday July 23, 2018  9:30 AM EDT ROUTINE CARE with Lizbeth Corbett MD  
[facility] [address]) [address] [facility] 7 [address] [phone] Discharge Orders None A check jovanni indicates which time of day the medication should be taken. My Medications START taking these medications Instructions Each Dose to Equal  
 Morning Noon Evening Bedtime  
 ondansetron 4 mg disintegrating tablet Commonly known as:  ZOFRAN ODT Your last dose was: Your next dose is: Take 1 Tab by mouth every eight (8) hours as needed for Nausea. 4 mg CHANGE how you take these medications Instructions Each Dose to Equal  
 Morning Noon Evening Bedtime CIALIS 20 mg tablet Generic drug:  tadalafil What changed:  Another medication with the same name was removed. Continue taking this medication, and follow the directions you see here. Your last dose was: Your next dose is: TAKE 1 TABLET BY MOUTH 1 HOUR PRIOR TO SEX ON AN EMPTY STOMACH -Do NOT take if you are currently taking nitrates. * HumaLOG Mix 75-25(U-100)Insuln 100 unit/mL (75-25) injection Generic drug:  insulin lispro protamine/insulin lispro What changed:  Another medication with the same name was removed. Continue taking this medication, and follow the directions you see here. Your last dose was: Your next dose is:    
   
   
 30 Units by SubCUTAneous route daily. 30 Units * HumaLOG Mix 75-25(U-100)Insuln 100 unit/mL (75-25) injection Generic drug:  insulin lispro protamine/insulin lispro What changed:  Another medication with the same name was removed. Continue taking this medication, and follow the directions you see here. Your last dose was: Your next dose is:    
   
   
 20 Units by SubCUTAneous route every evening. 20 Units * HumaLOG Mix 75-25(U-100)Insuln 100 unit/mL (75-25) injection Generic drug:  insulin lispro protamine/insulin lispro What changed:  Another medication with the same name was removed. Continue taking this medication, and follow the directions you see here. Your last dose was: Your next dose is:    
   
   
 by SubCUTAneous route See Admin Instructions. Patient also does a sliding scale, but was not sure of the exact scale. * Notice: This list has 3 medication(s) that are the same as other medications prescribed for you. Read the directions carefully, and ask your doctor or other care provider to review them with you. CONTINUE taking these medications Instructions Each Dose to Equal  
 Morning Noon Evening Bedtime  
 amLODIPine 5 mg tablet Commonly known as:  Maria Luisa Barnett Your last dose was: Your next dose is: TAKE 2 TABLETS BY MOUTH DAILY  
     
   
   
   
  
 atorvastatin 80 mg tablet Commonly known as:  LIPITOR Your last dose was: Your next dose is: TAKE 1 TABLET BY MOUTH EVERY DAY  
     
   
   
   
  
 benazepril 20 mg tablet Commonly known as:  LOTENSIN Your last dose was: Your next dose is: Take 1 Tab by mouth daily. 20 mg  
    
   
   
   
  
 doxazosin 2 mg tablet Commonly known as:  CARDURA Your last dose was: Your next dose is: Take 0.5 Tabs by mouth nightly. 1 mg  
    
   
   
   
  
 hydroCHLOROthiazide 12.5 mg tablet Commonly known as:  HYDRODIURIL Your last dose was: Your next dose is: Take 1 Tab by mouth daily. For blood pressure 12.5 mg  
    
   
   
   
  
  
STOP taking these medications ALEVE 220 mg tablet Generic drug:  naproxen sodium  
   
  
 naproxen sodium 220 mg Cap Commonly known as:  Yaya Garcia Where to Get Your Medications These medications were sent to [facility]/pharmacy #[address] [place] [address], [address] [place] [address], [address] [place] [address] Phone:  [phone]  
  ondansetron 4 mg disintegrating tablet Discharge Instructions PATIENT DISCHARGE INSTRUCTIONS PATIENT DISCHARGE INSTRUCTIONS Siobhan Young / [id number] : 1960 Admitted 2018 Discharged: 2018 · It is important that you take the medication exactly as they are prescribed. · Keep your medication in the bottles provided by the pharmacist and keep a list of the medication names, dosages, and times to be taken in your wallet. · Do not take other medications without consulting your doctor. What to do at [facility] Recommended Diet: Diabetic Diet Recommended Activity: Activity as tolerated If you experience any of the following symptoms nausea,vomiting, please follow up with Evangelista Xavier MD 
. Signed By: Evangelista Xavier MD   
 2018 [employer] Announcement We are excited to announce that we are making your provider's discharge notes available to you in [employer]. You will see these notes when they are completed and signed by the physician that discharged you from your recent hospital stay. If you have any questions or concerns about any information you see in [employer], please call the Health Information Department where you were seen or reach out to your Primary Care Provider for more information about your plan of care. Introducing hospitals & HEALTH SERVICES! Scarlett Peck introduces [employer] patient portal. Now you can access parts of your medical record, email your doctor's office, and request medication refills online. 1. In your internet browser, go to [email or web address] 2. Click on the First Time User? Click Here link in the Sign In box. You will see the New Member Sign Up page. 3. Enter your [employer] Access Code exactly as it appears below. You will not need to use this code after youve completed the sign-up process. If you do not sign up before the expiration date, you must request a new code. · [employer] Access Code: [email or web address] Expires: 10/4/2018 10:46 AM 
 
4. Enter the last four digits of your Social Security Number (xxxx) and Date of Birth (mm/dd/yyyy) as indicated and click Submit. You will be taken to the next sign-up page. 5. Create a [employer]Medical ID. This will be your [employer] login ID and cannot be changed, so think of one that is secure and easy to remember. 6. Create a [employer] password. You can change your password at any time. 7. Enter your Password Reset Question and Answer. This can be used at a later time if you forget your password. 8. Enter your e-mail address. You will receive e-mail notification when new information is available in [address] 9. Click Sign Up. You can now view and download portions of your medical record. 10. Click the Download Summary menu link to download a portable copy of your medical information. If you have questions, please visit the Frequently Asked Questions section of the M [employer]t website. Remember, Vivastream is NOT to be used for urgent needs. For medical emergencies, dial 911. Now available from your iPhone and Android! Introducing Gualberto Waller As a [employer] patient, I wanted to make you aware of our electronic visit tool called Gualberto Waller. Smallknot 24/7 allows you to connect within minutes with a medical provider 24 hours a day, seven days a week via a mobile device or tablet or logging into a secure website from your computer. You can access Gualberto Waller from anywhere in the [place]. A virtual visit might be right for you when you have a simple condition and feel like you just dont want to get out of bed, or cant get away from work for an appointment, when your regular [facility] provider is not available (evenings, weekends or holidays), or when youre out of town and need minor care. Electronic visits cost only $49 and if the [employer] provider determines a prescription is needed to treat your condition, one can be electronically transmitted to a nearby pharmacy*. Please take a moment to enroll today if you have not already done so. The enrollment process is free and takes just a few minutes. To enroll, please download the Lovelogica enmanuel to your tablet or phone, or visit [email or web address] to enroll on your computer. And, as an [address] [place] [address] patient with a ApptheGame account, the results of your visits will be scanned into your electronic medical record and your primary care provider will be able to view the scanned results. We urge you to continue to see your regular [facility] provider for your ongoing medical care.   And while your primary care provider may not be the one available when you seek a Gualberto Turkrodriguezfin virtual visit, the peace of mind you get from getting a real diagnosis real time can be priceless. For more information on Gualberto Madridfin, view our Frequently Asked Questions (FAQs) at [email or web address]. Sincerely, 
 
Darlene Boland MD 
Chief Medical Officer Connor Bloom *:  certain medications cannot be prescribed via [facility] Unresulted Labs-Please follow up with your PCP about these lab tests Order Current Status VENOUS BLOOD GAS Preliminary result Providers Seen During Your Hospitalization Provider Specialty Primary office phone Max العراقي MD Emergency Medicine [phone] Bart Richmond MD [facility] [phone] Your Primary Care Physician (PCP) Primary Care Physician Office Phone Office Fax Kia Sewell [phone] You are allergic to the following Allergen Reactions Benazepril Other (comments)  
 hyperkalemia Recent Documentation Height Weight BMI Smoking Status 1.727 m 72 kg 24.14 kg/m2 Never Smoker Emergency Contacts Name Discharge Info Relation Home Work Mobile Joce CAREGIVER [3] Spouse [3] [phone] Kraig Noonann  Spouse [3] [phone] Patient Belongings The following personal items are in your possession at time of discharge: 
  Dental Appliances: None  Visual Aid: None      Home Medications: None   Jewelry: Watch, With patient  Clothing: Shirt, Shorts, With patient    Other Valuables: Kayla Resendez, With patient Please provide this summary of care documentation to your next provider. Signatures-by signing, you are acknowledging that this After Visit Summary has been reviewed with you and you have received a copy. Patient Signature:  ____________________________________________________________ Date:  ____________________________________________________________  
  
Gaby Winters Provider Signature:  ____________________________________________________________ Date:  ____________________________________________________________

## 2018-07-06 NOTE — DIABETES MGMT
DTC Consult Note    Recommendations/ Comments: Noted pt current BG 278mg/dl and pt does not currently have an elevated anion gap. If appropriate, may consider starting Lantus 34 units daily, this is appox 80% of his home basal dose. Once diet ordered pt will also require prandial coverage. Met with pt for consult, pt reported that he has not been feeling well since Monday due to some type of stomach bug. Pt able to correctly verbalize proper storage and usage of insulin. Pt stated that he has not missed any of his insulin doses and takes it as prescribed. Pt reported he has a f/u appt with his endo Dr. Marcos Boss in October, he stated his wife contacted the office to make them aware pt is currently at hospital. Encouraged pt to f/u with endo via phone prior to October if still having difficulties with BG after d/c. Provided education materials and DTC contact info. Current hospital DM medication:       Consult received for:  [x]             Assessment of home management                []      Medication Recommendations                []             Meter/monitoring     []             Insulin instruction     []             New diagnosis     []             Outpatient education     []             Insulin pump patient     []             Insulin infusion     []             DKA/HHS    Chart reviewed and initial evaluation complete on Claudialucila Khan. Patient is a 59 y.o. male with known history of Type 2 Diabetes on Humalog mix 75/25 - 34 units acB and 24 units acD at home.       Assessed and instructed patient on the following:   ·  interpretation of lab results, blood sugar goals, complications of diabetes mellitus, illness management, SMBG skills, nutrition, referred to Diabetes Educator and site rotation    Encouraged the following:   · regular blood sugar monitoring: at least 2 times daily prior to insulin injections    Provided patient with the following: [x]             Survival skills education materials               [x]             Insulin education materials               []             CHO counting education materials               [x]             Outpatient DTC contact number               []             Glucometer                  Discussed with patient and/or family need for follow up appointment for diabetes management after discharge. A1c:   Lab Results   Component Value Date/Time    Hemoglobin A1c 8.3 (H) 11/14/2017 03:46 PM       Recent Glucose Results:   Lab Results   Component Value Date/Time     (H) 07/06/2018 01:01 PM    GLUCPOC 301 (H) 07/06/2018 01:54 PM        Lab Results   Component Value Date/Time    Creatinine 2.32 (H) 07/06/2018 01:01 PM     Estimated Creatinine Clearance: 33.2 mL/min (based on Cr of 2.32). Active Orders   There are no active orders of the following type(s): Diet. PO intake: No data found. Will continue to follow as needed. Thank you.     Gabriella Lancaster, [address] [place] [address], [address]  Office: [phone]

## 2018-07-06 NOTE — PROGRESS NOTES
HPI Comments: Akbar Cruz, with hx of DM1, CKD, HTN, HLD presents with fatigue, nausea and vomiting x 2 days. Vomiting once yesterday and once today. Has been dry heaving as well. Has not be able to eat/drink for 2 days. Has been hospitalized with DKA once, in 2013. Reports BS has persistently been running \"high\" 300s-400s. Took BS this AM which was 390s. Has been compliant with insulin regimen. Sees Dr. Rajesh Arredondo (endocrinology). Denies CP, SOB, dizziness, abdominal pain, HA, palpitations. The history is provided by the patient. Past Medical History:   Diagnosis Date    ARF (acute renal failure) ([address] 1/11/2013    Arthritis     knees    CKD stage 3 due to type 2 diabetes mellitus ([address]     Diabetes ([address] 1982    Type 1    DKA (diabetic ketoacidoses) ([address]     Hyperaldosteronism ([address]     s/p surgery    Hypercholesterolemia 11/30/2010    Hypertension 11/30/2010    Ventricular bigeminy 6/29/2011        Past Surgical History:   Procedure Laterality Date    COLONOSCOPY  4-09    kat- normal    ENDOSCOPY, COLON, DIAGNOSTIC  2009    Dr Demetria Alvarez [facility]  [id number]    left adenoma removed for hyperaldosteronism    HX BUNIONECTOMY Right     HX CATARACT REMOVAL Right     HX HEENT      laser surgery left eye         Family History   Problem Relation Age of Onset    Hypertension Father     Diabetes Father      Type 2    Diabetes Brother      Type 2        Social History     Social History    Marital status:      Spouse name: N/A    Number of children: 1    Years of education: N/A     Occupational History    Not on file.      Social History Main Topics    Smoking status: Never Smoker    Smokeless tobacco: Never Used    Alcohol use 0.0 oz/week     0 Standard drinks or equivalent per week      Comment: may have beer once a week or mixed drink 2 times a month    Drug use: No    Sexual activity: Yes     Partners: Female     Birth control/ protection: None     Other Topics Concern    Not on file     Social History Narrative    Lives in [facility] with wife and mother-in-law and 25year old daughter. Works at [employer] for 37 years as a . Likes to do yardwork. ALLERGIES: Benazepril    Review of Systems   Constitutional: Positive for activity change and appetite change. Negative for chills and fever. HENT: Negative for congestion, rhinorrhea and sore throat. Respiratory: Negative for cough, shortness of breath and wheezing. Cardiovascular: Negative for chest pain and palpitations. Gastrointestinal: Positive for nausea and vomiting. Negative for abdominal pain and diarrhea. Genitourinary: Negative for dysuria. Musculoskeletal: Negative for myalgias. Skin: Negative for rash. Neurological: Negative for dizziness and headaches. Hematological: Negative for adenopathy. Vitals:    07/06/18 1045   BP: 159/86   Pulse: 84   Resp: 18   Temp: 98.6 °F (37 °C)   SpO2: 98%   Weight: 159 lb (72.1 kg)   Height: 5' 8\" (1.727 m)       Physical Exam   Constitutional: He appears well-developed and well-nourished. No distress. HENT:   Mouth/Throat: Mucous membranes are dry. Cardiovascular: Normal rate, regular rhythm and normal heart sounds. Pulmonary/Chest: Effort normal and breath sounds normal. No respiratory distress. He has no wheezes. He has no rales. Abdominal: Soft. Bowel sounds are normal. He exhibits no distension. There is no tenderness. There is no rigidity, no rebound, no guarding and no CVA tenderness. Neurological: He is alert. Skin: He is not diaphoretic. Psychiatric: He has a normal mood and affect. His behavior is normal. Judgment and thought content normal.   Nursing note and vitals reviewed.       [facility]    EKG    Date/Time: 7/6/2018 12:01 PM  Performed by: Kamilla Sanchez  Authorized by: Kamilla aSnchez   Rhythm: sinus rhythm  Rate: normal  BPM: 77  QRS axis: normal  Conduction: conduction normal  ST Segments: ST segments normal  T Waves: T waves normal                     ICD-10-CM ICD-9-CM    1. Vomiting, intractability of vomiting not specified, presence of nausea not specified, unspecified vomiting type R11.10 787.03 AMB POC GLUCOSE BLOOD, BY GLUCOSE MONITORING DEVICE      AMB POC ISTAT CHEM 8+   2. Hyperkalemia E87.5 276.7 AMB POC EKG ROUTINE W/ 12 LEADS, INTER & REP      CANCELED: EKG, 12 LEAD, INITIAL   3. Hyperglycemia due to type 1 diabetes mellitus ([facility] E10.65 250.01    4. Dehydration E86.0 276.51      Medications Ordered Today   Medications    ondansetron (ZOFRAN ODT) 4 mg disintegrating tablet     Sig: Take 1 Tab by mouth now for 1 dose. Dispense:  1 Tab     Refill:  0     Order Specific Question:   Expiration Date     Answer:   11/6/2019     Order Specific Question:   Lot#     Answer:   [id number]     Order Specific Question:        Answer:   Gui         Results for orders placed or performed in visit on 07/06/18   AMB POC GLUCOSE BLOOD, BY GLUCOSE MONITORING DEVICE   Result Value Ref Range    Glucose POC hhh mg/dL   AMB POC ISTAT CHEM 8+   Result Value Ref Range    Sodium,  MMOL/L    Potassium, POC 6.0 MMOL/L    Chloride, POC 99 MMOL/L    Calcium, ionized (POC) 1.24     CO2, POC 24 MMOL/L    Glucose,  MG/DL    BUN, POC 56 MG/DL    Creatinine, POC 2.0 (A) 0.6 - 1.3 MG/DL    Hematocrit, POC 53 %    Hemoglobin, POC  G/DL    Anion gap, POC 19 MMOL/L     Possible DKA  Referred to ER for further management. [facility] ED via EMS.   D/w ER Attending

## 2018-07-07 VITALS
WEIGHT: 158.73 LBS | BODY MASS INDEX: 24.06 KG/M2 | RESPIRATION RATE: 16 BRPM | OXYGEN SATURATION: 99 % | HEIGHT: 68 IN | SYSTOLIC BLOOD PRESSURE: 120 MMHG | HEART RATE: 65 BPM | TEMPERATURE: 98.6 F | DIASTOLIC BLOOD PRESSURE: 77 MMHG

## 2018-07-07 LAB
ADMINISTERED INITIALS, ADMINIT: NORMAL
ALBUMIN SERPL-MCNC: 3.2 G/DL (ref 3.5–5)
ALBUMIN/GLOB SERPL: 0.9 {RATIO} (ref 1.1–2.2)
ALP SERPL-CCNC: 95 U/L (ref 45–117)
ALT SERPL-CCNC: 27 U/L (ref 12–78)
ANION GAP SERPL CALC-SCNC: 7 MMOL/L (ref 5–15)
ANION GAP SERPL CALC-SCNC: 8 MMOL/L (ref 5–15)
AST SERPL-CCNC: 19 U/L (ref 15–37)
BILIRUB SERPL-MCNC: 0.6 MG/DL (ref 0.2–1)
BUN SERPL-MCNC: 31 MG/DL (ref 6–20)
BUN SERPL-MCNC: 36 MG/DL (ref 6–20)
BUN/CREAT SERPL: 15 (ref 12–20)
BUN/CREAT SERPL: 21 (ref 12–20)
CALCIUM SERPL-MCNC: 8.2 MG/DL (ref 8.5–10.1)
CALCIUM SERPL-MCNC: 8.5 MG/DL (ref 8.5–10.1)
CHLORIDE SERPL-SCNC: 105 MMOL/L (ref 97–108)
CHLORIDE SERPL-SCNC: 110 MMOL/L (ref 97–108)
CO2 SERPL-SCNC: 24 MMOL/L (ref 21–32)
CO2 SERPL-SCNC: 26 MMOL/L (ref 21–32)
CREAT SERPL-MCNC: 1.73 MG/DL (ref 0.7–1.3)
CREAT SERPL-MCNC: 2.01 MG/DL (ref 0.7–1.3)
D50 ADMINISTERED, D50ADM: 0 ML
D50 ORDER, D50ORD: 0 ML
GLOBULIN SER CALC-MCNC: 3.7 G/DL (ref 2–4)
GLSCOM COMMENTS: NORMAL
GLUCOSE BLD STRIP.AUTO-MCNC: 162 MG/DL (ref 65–100)
GLUCOSE BLD STRIP.AUTO-MCNC: 177 MG/DL (ref 65–100)
GLUCOSE BLD STRIP.AUTO-MCNC: 178 MG/DL (ref 65–100)
GLUCOSE BLD STRIP.AUTO-MCNC: 188 MG/DL (ref 65–100)
GLUCOSE BLD STRIP.AUTO-MCNC: 192 MG/DL (ref 65–100)
GLUCOSE BLD STRIP.AUTO-MCNC: 199 MG/DL (ref 65–100)
GLUCOSE BLD STRIP.AUTO-MCNC: 223 MG/DL (ref 65–100)
GLUCOSE BLD STRIP.AUTO-MCNC: 232 MG/DL (ref 65–100)
GLUCOSE BLD STRIP.AUTO-MCNC: 240 MG/DL (ref 65–100)
GLUCOSE BLD STRIP.AUTO-MCNC: 301 MG/DL (ref 65–100)
GLUCOSE SERPL-MCNC: 238 MG/DL (ref 65–100)
GLUCOSE SERPL-MCNC: 284 MG/DL (ref 65–100)
GLUCOSE, GLC: 162 MG/DL
GLUCOSE, GLC: 177 MG/DL
GLUCOSE, GLC: 178 MG/DL
GLUCOSE, GLC: 188 MG/DL
GLUCOSE, GLC: 192 MG/DL
GLUCOSE, GLC: 199 MG/DL
GLUCOSE, GLC: 240 MG/DL
HIGH TARGET, HITG: 250 MG/DL
INSULIN ADMINSTERED, INSADM: 0.5 UNITS/HOUR
INSULIN ADMINSTERED, INSADM: 0.6 UNITS/HOUR
INSULIN ADMINSTERED, INSADM: 0.7 UNITS/HOUR
INSULIN ADMINSTERED, INSADM: 0.7 UNITS/HOUR
INSULIN ADMINSTERED, INSADM: 0.9 UNITS/HOUR
INSULIN ORDER, INSORD: 0.5 UNITS/HOUR
INSULIN ORDER, INSORD: 0.6 UNITS/HOUR
INSULIN ORDER, INSORD: 0.7 UNITS/HOUR
INSULIN ORDER, INSORD: 0.7 UNITS/HOUR
INSULIN ORDER, INSORD: 0.9 UNITS/HOUR
LOW TARGET, LOT: 150 MG/DL
MAGNESIUM SERPL-MCNC: 2.4 MG/DL (ref 1.6–2.4)
MINUTES UNTIL NEXT BG, NBG: 120 MIN
MINUTES UNTIL NEXT BG, NBG: 60 MIN
MULTIPLIER, MUL: 0.01
ORDER INITIALS, ORDINIT: NORMAL
PHOSPHATE SERPL-MCNC: 3.4 MG/DL (ref 2.6–4.7)
POTASSIUM SERPL-SCNC: 4 MMOL/L (ref 3.5–5.1)
POTASSIUM SERPL-SCNC: 4.2 MMOL/L (ref 3.5–5.1)
PROT SERPL-MCNC: 6.9 G/DL (ref 6.4–8.2)
SERVICE CMNT-IMP: ABNORMAL
SODIUM SERPL-SCNC: 138 MMOL/L (ref 136–145)
SODIUM SERPL-SCNC: 142 MMOL/L (ref 136–145)

## 2018-07-07 PROCEDURE — 74011000258 HC RX REV CODE- 258: Performed by: EMERGENCY MEDICINE

## 2018-07-07 PROCEDURE — 74011250637 HC RX REV CODE- 250/637: Performed by: EMERGENCY MEDICINE

## 2018-07-07 PROCEDURE — 80053 COMPREHEN METABOLIC PANEL: CPT

## 2018-07-07 PROCEDURE — 83735 ASSAY OF MAGNESIUM: CPT | Performed by: FAMILY MEDICINE

## 2018-07-07 PROCEDURE — 74011250636 HC RX REV CODE- 250/636: Performed by: EMERGENCY MEDICINE

## 2018-07-07 PROCEDURE — 84100 ASSAY OF PHOSPHORUS: CPT | Performed by: FAMILY MEDICINE

## 2018-07-07 PROCEDURE — 82962 GLUCOSE BLOOD TEST: CPT

## 2018-07-07 PROCEDURE — 74011636637 HC RX REV CODE- 636/637: Performed by: FAMILY MEDICINE

## 2018-07-07 PROCEDURE — 36415 COLL VENOUS BLD VENIPUNCTURE: CPT

## 2018-07-07 RX ORDER — DEXTROSE 50 % IN WATER (D50W) INTRAVENOUS SYRINGE
12.5-25 AS NEEDED
Status: DISCONTINUED | OUTPATIENT
Start: 2018-07-07 | End: 2018-07-07 | Stop reason: HOSPADM

## 2018-07-07 RX ORDER — ONDANSETRON 4 MG/1
4 TABLET, ORALLY DISINTEGRATING ORAL
Qty: 10 TAB | Refills: 1 | Status: SHIPPED | OUTPATIENT
Start: 2018-07-07 | End: 2018-10-15

## 2018-07-07 RX ORDER — INSULIN LISPRO 100 [IU]/ML
INJECTION, SOLUTION INTRAVENOUS; SUBCUTANEOUS
Status: DISCONTINUED | OUTPATIENT
Start: 2018-07-07 | End: 2018-07-07 | Stop reason: HOSPADM

## 2018-07-07 RX ORDER — MAGNESIUM SULFATE 100 %
4 CRYSTALS MISCELLANEOUS AS NEEDED
Status: DISCONTINUED | OUTPATIENT
Start: 2018-07-07 | End: 2018-07-07 | Stop reason: SDUPTHER

## 2018-07-07 RX ADMIN — INSULIN HUMAN 25 UNITS: 100 INJECTION, SUSPENSION SUBCUTANEOUS at 17:51

## 2018-07-07 RX ADMIN — INSULIN LISPRO 7 UNITS: 100 INJECTION, SOLUTION INTRAVENOUS; SUBCUTANEOUS at 08:41

## 2018-07-07 RX ADMIN — INSULIN LISPRO 3 UNITS: 100 INJECTION, SOLUTION INTRAVENOUS; SUBCUTANEOUS at 11:08

## 2018-07-07 RX ADMIN — AMLODIPINE BESYLATE 5 MG: 5 TABLET ORAL at 08:41

## 2018-07-07 RX ADMIN — INSULIN LISPRO 3 UNITS: 100 INJECTION, SOLUTION INTRAVENOUS; SUBCUTANEOUS at 16:30

## 2018-07-07 RX ADMIN — INSULIN HUMAN 25 UNITS: 100 INJECTION, SUSPENSION SUBCUTANEOUS at 08:40

## 2018-07-07 RX ADMIN — Medication 10 ML: at 05:31

## 2018-07-07 RX ADMIN — DEXTROSE MONOHYDRATE AND SODIUM CHLORIDE 125 ML/HR: 5; .9 INJECTION, SOLUTION INTRAVENOUS at 00:29

## 2018-07-07 RX ADMIN — HEPARIN SODIUM 5000 UNITS: 5000 INJECTION, SOLUTION INTRAVENOUS; SUBCUTANEOUS at 08:41

## 2018-07-07 NOTE — DISCHARGE SUMMARY
Physician Discharge Summary       Patient: Luzmaria Sales MRN: [id number]  SSN: [id number]    YOB: 1960  Age: 59 y.o. Sex: male    PCP: Anay Nogueira MD    Allergies: Benazepril    Admit date: 7/6/2018  Admitting Provider: Neelam Westfall MD    Discharge date: 7/7/2018  Discharging Provider: Jonelle Rosado MD    * Admission Diagnoses: NANDA (acute kidney injury) [facility]); Hyperglycemia    * Discharge Diagnoses:    Hospital Problems as of 7/7/2018  Date Reviewed: 3/19/2018          Codes Class Noted - Resolved POA    Hyperglycemia ICD-10-CM: R73.9  ICD-9-CM: 790.29  7/6/2018 - Present Unknown        NANDA (acute kidney injury) ([facility] ICD-10-CM: N17.9  ICD-9-CM: 584.9  7/6/2018 - Present Unknown        Type 2 diabetes with nephropathy ([facility] ICD-10-CM: E11.21  ICD-9-CM: 250.40, 583.81  3/13/2018 - Present Yes        Diabetic ketoacidosis without coma associated with type 1 diabetes mellitus ([facility] ICD-10-CM: E10.10  ICD-9-CM: 250.11  1/12/2013 - Present Yes              * Hospital Course: elderly male admitted through the ER on 7/6/18,referred from Urgent Care for DKA,following several days of nausea and por po intake. Admitting sugar was 468. He was admitted to telemetry on NS and Insulin drip by protocol. Labs and sugars corrected rapidly,he tolerated diet and was in good condition on discharge on 7/7/18    * Procedures: *  * No surgery found *      Consults: None    Significant Diagnostic Studies: labs: ,Cr  3    Discharge Exam:  Visit Vitals    /77    Pulse 65    Temp 98.6 °F (37 °C)    Resp 16    Ht 5' 8\" (1.727 m)    Wt 158 lb 11.7 oz (72 kg)    SpO2 99%    BMI 24.14 kg/m2     General appearance: alert, cooperative, no distress, appears stated age  Lungs: clear to auscultation bilaterally  Heart: regular rate and rhythm, S1, S2 normal, no murmur, click, rub or gallop  Abdomen: soft, non-tender.  Bowel sounds normal. No masses,  no organomegaly    * Discharge Condition: good  * Disposition: Home    Discharge Medications:  Current Discharge Medication List      START taking these medications    Details   ondansetron (ZOFRAN ODT) 4 mg disintegrating tablet Take 1 Tab by mouth every eight (8) hours as needed for Nausea. Qty: 10 Tab, Refills: 1         CONTINUE these medications which have NOT CHANGED    Details   !! insulin lispro protamine/insulin lispro (HUMALOG MIX 75-25,U-100,INSULN) 100 unit/mL (75-25) injection 30 Units by SubCUTAneous route daily. !! insulin lispro protamine/insulin lispro (HUMALOG MIX 75-25,U-100,INSULN) 100 unit/mL (75-25) injection 20 Units by SubCUTAneous route every evening. !! insulin lispro protamine/insulin lispro (HUMALOG MIX 75-25,U-100,INSULN) 100 unit/mL (75-25) injection by SubCUTAneous route See Admin Instructions. Patient also does a sliding scale, but was not sure of the exact scale. amLODIPine (NORVASC) 5 mg tablet TAKE 2 TABLETS BY MOUTH DAILY  Qty: 180 Tab, Refills: 3      CIALIS 20 mg tablet TAKE 1 TABLET BY MOUTH 1 HOUR PRIOR TO SEX ON AN EMPTY STOMACH -Do NOT take if you are currently taking nitrates. Qty: 5 Tab, Refills: 5      benazepril (LOTENSIN) 20 mg tablet Take 1 Tab by mouth daily. Qty: 30 Tab, Refills: 11      hydroCHLOROthiazide (HYDRODIURIL) 12.5 mg tablet Take 1 Tab by mouth daily. For blood pressure  Qty: 90 Tab, Refills: 3      atorvastatin (LIPITOR) 80 mg tablet TAKE 1 TABLET BY MOUTH EVERY DAY  Qty: 90 Tab, Refills: 1      doxazosin (CARDURA) 2 mg tablet Take 0.5 Tabs by mouth nightly. Qty: 45 Tab, Refills: 3       !! - Potential duplicate medications found. Please discuss with provider. STOP taking these medications       naproxen sodium (ALEVE) 220 mg tablet Comments:   Reason for Stopping:         naproxen sodium (ALEVE) 220 mg cap Comments:   Reason for Stopping:               * Follow-up Care/Patient Instructions:   Activity: Activity as tolerated  Diet: Diabetic Diet  Wound Care: None needed    Follow-up Information     Follow up With Details Comments Contact Info    Juventino Valentin MD   [facility] [address]  [facility] [address]  [phone]            Signed:  Marcello [place] MD  7/7/2018  4:07 PM

## 2018-07-07 NOTE — PROGRESS NOTES
General Daily Progress Note Admit Date: 7/6/2018 Hospital day 2 Subjective:  
 
Patient has no complaints. Argenis Marino Medication side effects: none Current Facility-Administered Medications Medication Dose Route Frequency  insulin NPH (NOVOLIN N, HUMULIN N) injection 25 Units  25 Units SubCUTAneous ACB&D  
 insulin lispro (HUMALOG) injection   SubCUTAneous AC&HS  
 glucose chewable tablet 16 g  4 Tab Oral PRN  
 dextrose (D50W) injection syrg 12.5-25 g  12.5-25 g IntraVENous PRN  
 glucagon (GLUCAGEN) injection 1 mg  1 mg IntraMUSCular PRN  
 insulin lispro (HUMALOG) injection   SubCUTAneous TIDAC  glucose chewable tablet 16 g  4 Tab Oral PRN  
 glucagon (GLUCAGEN) injection 1 mg  1 mg IntraMUSCular PRN  
 sodium chloride (NS) flush 5-10 mL  5-10 mL IntraVENous Q8H  
 sodium chloride (NS) flush 5-10 mL  5-10 mL IntraVENous PRN  
 acetaminophen (TYLENOL) tablet 650 mg  650 mg Oral Q4H PRN  
 ondansetron (ZOFRAN) injection 4 mg  4 mg IntraVENous Q4H PRN  
 heparin (porcine) injection 5,000 Units  5,000 Units SubCUTAneous Q8H  
 amLODIPine (NORVASC) tablet 5 mg  5 mg Oral DAILY  doxazosin (CARDURA) tablet 1 mg  1 mg Oral QHS  atorvastatin (LIPITOR) tablet 80 mg  80 mg Oral QHS  
 0.9% sodium chloride infusion  125 mL/hr IntraVENous CONTINUOUS  
 dextrose 5% and 0.9% NaCl infusion  125 mL/hr IntraVENous CONTINUOUS Review of Systems Constitutional: negative Respiratory: negative Cardiovascular: negative Gastrointestinal: negative Genitourinary:negative Objective:  
 
Patient Vitals for the past 8 hrs: 
 BP Temp Pulse Resp SpO2  
07/07/18 0235 132/81 98 °F (36.7 °C) 65 14 99 % 07/05 1901 - 07/07 0700 In: 480 [P.O.:480] Out: - Physical Exam:  
Visit Vitals  /81 (BP 1 Location: Right arm, BP Patient Position: At rest)  Pulse 65  Temp 98 °F (36.7 °C)  Resp 14  
 Ht 5' 8\" (1.727 m)  Wt 158 lb 11.7 oz (72 kg)  SpO2 99%  BMI 24.14 kg/m2 General appearance: alert, fatigued, cooperative, no distress, appears stated age Lungs: clear to auscultation bilaterally Heart: regular rate and rhythm Abdomen: soft, non-tender. Bowel sounds normal. No masses,  no organomegaly Male genitalia: normal 
Extremities: extremities normal, atraumatic, no cyanosis or edema ECG: normal sinus rhythm Data Review Recent Results (from the past 24 hour(s)) AMB POC GLUCOSE BLOOD, BY GLUCOSE MONITORING DEVICE Collection Time: 07/06/18 11:03 AM  
Result Value Ref Range Glucose POC hhh mg/dL AMB POC ISTAT CHEM 8+ Collection Time: 07/06/18 11:05 AM  
Result Value Ref Range Sodium,  MMOL/L Potassium, POC 6.0 MMOL/L Chloride, POC 99 MMOL/L Calcium, ionized (POC) 1.24   
 CO2, POC 24 MMOL/L Glucose,  MG/DL  
 BUN, POC 56 MG/DL Creatinine, POC 2.0 (A) 0.6 - 1.3 MG/DL Hematocrit, POC 53 % Hemoglobin, POC  G/DL Anion gap, POC 19 MMOL/L  
URINALYSIS W/ REFLEX CULTURE Collection Time: 07/06/18  1:01 PM  
Result Value Ref Range Color YELLOW/STRAW Appearance CLEAR CLEAR Specific gravity 1.022 1.003 - 1.030    
 pH (UA) 5.0 5.0 - 8.0 Protein NEGATIVE  NEG mg/dL Glucose >1000 (A) NEG mg/dL Ketone 15 (A) NEG mg/dL Bilirubin NEGATIVE  NEG Blood NEGATIVE  NEG Urobilinogen 0.2 0.2 - 1.0 EU/dL Nitrites NEGATIVE  NEG Leukocyte Esterase NEGATIVE  NEG    
 WBC 0-4 0 - 4 /hpf  
 RBC 0-5 0 - 5 /hpf Epithelial cells FEW FEW /lpf Bacteria NEGATIVE  NEG /hpf  
 UA:UC IF INDICATED CULTURE NOT INDICATED BY UA RESULT CNI Hyaline cast 0-2 0 - 5 /lpf  
CBC W/O DIFF Collection Time: 07/06/18  1:01 PM  
Result Value Ref Range WBC 9.6 4.1 - 11.1 K/uL  
 RBC 4.55 4.10 - 5.70 M/uL  
 HGB 14.2 12.1 - 17.0 g/dL HCT 42.4 36.6 - 50.3 % MCV 93.2 80.0 - 99.0 FL  
 MCH 31.2 26.0 - 34.0 PG  
 MCHC 33.5 30.0 - 36.5 g/dL  
 RDW 12.0 11.5 - 14.5 % PLATELET 351 067 - 676 K/uL  MPV 10.8 8.9 - 12.9 FL  
 NRBC 0.0 0  WBC ABSOLUTE NRBC 0.00 0.00 - 0.01 K/uL METABOLIC PANEL, COMPREHENSIVE Collection Time: 07/06/18  1:01 PM  
Result Value Ref Range Sodium 135 (L) 136 - 145 mmol/L Potassium 5.1 3.5 - 5.1 mmol/L Chloride 101 97 - 108 mmol/L  
 CO2 25 21 - 32 mmol/L Anion gap 9 5 - 15 mmol/L Glucose 439 (H) 65 - 100 mg/dL BUN 57 (H) 6 - 20 MG/DL Creatinine 2.32 (H) 0.70 - 1.30 MG/DL  
 BUN/Creatinine ratio 25 (H) 12 - 20 GFR est AA 35 (L) >60 ml/min/1.73m2 GFR est non-AA 29 (L) >60 ml/min/1.73m2 Calcium 8.8 8.5 - 10.1 MG/DL Bilirubin, total 0.5 0.2 - 1.0 MG/DL  
 ALT (SGPT) 27 12 - 78 U/L  
 AST (SGOT) 10 (L) 15 - 37 U/L Alk. phosphatase 111 45 - 117 U/L Protein, total 7.7 6.4 - 8.2 g/dL Albumin 3.6 3.5 - 5.0 g/dL Globulin 4.1 (H) 2.0 - 4.0 g/dL A-G Ratio 0.9 (L) 1.1 - 2.2 VENOUS BLOOD GAS Collection Time: 07/06/18  1:01 PM  
Result Value Ref Range VENOUS PH 7.27 (L) 7.32 - 7.42    
 VENOUS PCO2 50 41 - 51 mmHg VENOUS PO2 26 25 - 40 mmHg VENOUS O2 SATURATION PENDING % VENOUS BICARBONATE 23 23 - 28 mmol/L  
 VENOUS BASE DEFICIT 4.8 mmol/L  
 O2 METHOD ROOM AIR    
 SPONTANEOUS RATE 18.0 Sample source VENOUS    
 SITE OTHER    
HEMOGLOBIN A1C WITH EAG Collection Time: 07/06/18  1:01 PM  
Result Value Ref Range Hemoglobin A1c 11.3 (H) 4.2 - 6.3 % Est. average glucose 278 mg/dL MAGNESIUM Collection Time: 07/06/18  1:01 PM  
Result Value Ref Range Magnesium 2.7 (H) 1.6 - 2.4 mg/dL LIPASE Collection Time: 07/06/18  1:01 PM  
Result Value Ref Range Lipase 137 73 - 393 U/L  
GLUCOSE, POC Collection Time: 07/06/18  1:54 PM  
Result Value Ref Range Glucose (POC) 301 (H) 65 - 100 mg/dL Performed by [facility] Cindi GLUCOSE, POC Collection Time: 07/06/18  3:12 PM  
Result Value Ref Range Glucose (POC) 278 (H) 65 - 100 mg/dL Performed by Wing Jay GLUCOSE, POC  Collection Time: 07/06/18  3:49 PM  
Result Value Ref Range Glucose (POC) 227 (H) 65 - 100 mg/dL Performed by Daryl Hurley Collection Time: 07/06/18  3:50 PM  
Result Value Ref Range Glucose 227 mg/dL Insulin order 3.3 units/hour Insulin adminstered 3.3 units/hour Multiplier 0.020 Low target 150 mg/dL High target 250 mg/dL D50 order 0.0 ml  
 D50 administered 0.00 ml Minutes until next BG 60 min Order [facility] Administered [facility]   
 GLSCOM Comments METABOLIC PANEL, BASIC Collection Time: 07/06/18  4:27 PM  
Result Value Ref Range Sodium 140 136 - 145 mmol/L Potassium 4.4 3.5 - 5.1 mmol/L Chloride 106 97 - 108 mmol/L  
 CO2 25 21 - 32 mmol/L Anion gap 9 5 - 15 mmol/L Glucose 230 (H) 65 - 100 mg/dL BUN 52 (H) 6 - 20 MG/DL Creatinine 2.10 (H) 0.70 - 1.30 MG/DL  
 BUN/Creatinine ratio 25 (H) 12 - 20 GFR est AA 39 (L) >60 ml/min/1.73m2 GFR est non-AA 32 (L) >60 ml/min/1.73m2 Calcium 9.0 8.5 - 10.1 MG/DL  
GLUCOSE, POC Collection Time: 07/06/18  4:51 PM  
Result Value Ref Range Glucose (POC) 236 (H) 65 - 100 mg/dL Performed by Daryl Hurley Collection Time: 07/06/18  4:52 PM  
Result Value Ref Range Glucose 236 mg/dL Insulin order 3.5 units/hour Insulin adminstered 3.5 units/hour Multiplier 0.020 Low target 150 mg/dL High target 250 mg/dL D50 order 0.0 ml  
 D50 administered 0.00 ml Minutes until next BG 60 min Order [facility] Administered [facility]   
 GLSCOM Comments GLUCOSE, POC Collection Time: 07/06/18  5:44 PM  
Result Value Ref Range Glucose (POC) 230 (H) 65 - 100 mg/dL Performed by PRASHANT LEMA (PCT) CON   
GLUCOSTABILIZER Collection Time: 07/06/18  5:45 PM  
Result Value Ref Range Glucose 230 mg/dL Insulin order 3.4 units/hour Insulin adminstered 3.4 units/hour Multiplier 0.020 Low target 150 mg/dL High target 250 mg/dL D50 order 0.0 ml  
 D50 administered 0.00 ml Minutes until next BG 60 min Order initials se Administered initials se GLSCOM Comments GLUCOSE, POC Collection Time: 07/06/18  6:47 PM  
Result Value Ref Range Glucose (POC) 253 (H) 65 - 100 mg/dL Performed by PRASHANT LEMA (PCT) CON   
GLUCOSTABILIZER Collection Time: 07/06/18  6:48 PM  
Result Value Ref Range Glucose 253 mg/dL Insulin order 5.8 units/hour Insulin adminstered 5.8 units/hour Multiplier 0.030 Low target 150 mg/dL High target 250 mg/dL D50 order 0.0 ml  
 D50 administered 0.00 ml Minutes until next BG 60 min Order initials se Administered initials se GLSCOM Comments GLUCOSE, POC Collection Time: 07/06/18  7:52 PM  
Result Value Ref Range Glucose (POC) 223 (H) 65 - 100 mg/dL Performed by Harsha Conway Collection Time: 07/06/18  7:52 PM  
Result Value Ref Range Glucose 223 mg/dL Insulin order 4.9 units/hour Insulin adminstered 4.9 units/hour Multiplier 0.030 Low target 150 mg/dL High target 250 mg/dL D50 order 0.0 ml  
 D50 administered 0.00 ml Minutes until next BG 60 min Order initials [place] Administered initials [place]   
 GLSCOM Comments GLUCOSE, POC Collection Time: 07/06/18  8:39 PM  
Result Value Ref Range Glucose (POC) 159 (H) 65 - 100 mg/dL Performed by Gabbi Lea (PCT) Malini Ngo Collection Time: 07/06/18  8:54 PM  
Result Value Ref Range Glucose 159 mg/dL Insulin order 3.0 units/hour Insulin adminstered 3.0 units/hour Multiplier 0.030 Low target 150 mg/dL High target 250 mg/dL D50 order 0.0 ml  
 D50 administered 0.00 ml Minutes until next  min Order initials [place] Administered initials [place]   
 GLSCOM Comments METABOLIC PANEL, BASIC Collection Time: 07/06/18 10:20 PM  
Result Value Ref Range Sodium 144 136 - 145 mmol/L  Potassium 3.8 3.5 - 5.1 mmol/L Chloride 111 (H) 97 - 108 mmol/L  
 CO2 25 21 - 32 mmol/L Anion gap 8 5 - 15 mmol/L Glucose 81 65 - 100 mg/dL BUN 42 (H) 6 - 20 MG/DL Creatinine 1.72 (H) 0.70 - 1.30 MG/DL  
 BUN/Creatinine ratio 24 (H) 12 - 20 GFR est AA 50 (L) >60 ml/min/1.73m2 GFR est non-AA 41 (L) >60 ml/min/1.73m2 Calcium 8.8 8.5 - 10.1 MG/DL  
GLUCOSE, POC Collection Time: 07/06/18 11:00 PM  
Result Value Ref Range Glucose (POC) 67 65 - 100 mg/dL Performed by Manuela Lemos Collection Time: 07/06/18 11:01 PM  
Result Value Ref Range Glucose 67 mg/dL Insulin order 0.0 units/hour Insulin adminstered 0.0 units/hour Multiplier 0.015 Low target 150 mg/dL High target 250 mg/dL D50 order 13.0 ml  
 D50 administered 13.00 ml Minutes until next BG 15 min Order initials ca Administered initials ca GLSCOM Comments GLUCOSE, POC Collection Time: 07/06/18 11:17 PM  
Result Value Ref Range Glucose (POC) 122 (H) 65 - 100 mg/dL Performed by Manuela Lemos Collection Time: 07/06/18 11:18 PM  
Result Value Ref Range Glucose 122 mg/dL Insulin order 0.3 units/hour Insulin adminstered 0.3 units/hour Multiplier 0.005 Low target 150 mg/dL High target 250 mg/dL D50 order 0.0 ml  
 D50 administered 0.00 ml Minutes until next BG 60 min Order initials ca Administered initials ca GLSCOM Comments GLUCOSE, POC Collection Time: 07/07/18 12:26 AM  
Result Value Ref Range Glucose (POC) 188 (H) 65 - 100 mg/dL Performed by Lamin Caro Collection Time: 07/07/18 12:27 AM  
Result Value Ref Range Glucose 188 mg/dL Insulin order 0.6 units/hour Insulin adminstered 0.6 units/hour Multiplier 0.005 Low target 150 mg/dL High target 250 mg/dL D50 order 0.0 ml  
 D50 administered 0.00 ml Minutes until next BG 60 min Order initials akh  Administered initials arnaldo GLSCOM Comments GLUCOSE, POC Collection Time: 07/07/18  1:35 AM  
Result Value Ref Range Glucose (POC) 162 (H) 65 - 100 mg/dL Performed by Robbie Calixto Collection Time: 07/07/18  1:36 AM  
Result Value Ref Range Glucose 162 mg/dL Insulin order 0.5 units/hour Insulin adminstered 0.5 units/hour Multiplier 0.005 Low target 150 mg/dL High target 250 mg/dL D50 order 0.0 ml  
 D50 administered 0.00 ml Minutes until next BG 60 min Order initials ca Administered initials ca GLSCOM Comments GLUCOSE, POC Collection Time: 07/07/18  2:28 AM  
Result Value Ref Range Glucose (POC) 177 (H) 65 - 100 mg/dL Performed by Robbie Calixto Collection Time: 07/07/18  2:29 AM  
Result Value Ref Range Glucose 177 mg/dL Insulin order 0.6 units/hour Insulin adminstered 0.6 units/hour Multiplier 0.005 Low target 150 mg/dL High target 250 mg/dL D50 order 0.0 ml  
 D50 administered 0.00 ml Minutes until next BG 60 min Order initials ca Administered initials ca GLSCOM Comments GLUCOSE, POC Collection Time: 07/07/18  3:21 AM  
Result Value Ref Range Glucose (POC) 178 (H) 65 - 100 mg/dL Performed by Robbie Calixto Collection Time: 07/07/18  3:22 AM  
Result Value Ref Range Glucose 178 mg/dL Insulin order 0.6 units/hour Insulin adminstered 0.6 units/hour Multiplier 0.005 Low target 150 mg/dL High target 250 mg/dL D50 order 0.0 ml  
 D50 administered 0.00 ml Minutes until next BG 60 min Order initials ca Administered initials ca GLSCOM Comments METABOLIC PANEL, BASIC Collection Time: 07/07/18  3:24 AM  
Result Value Ref Range Sodium 142 136 - 145 mmol/L Potassium 4.0 3.5 - 5.1 mmol/L Chloride 110 (H) 97 - 108 mmol/L  
 CO2 24 21 - 32 mmol/L Anion gap 8 5 - 15 mmol/L  Glucose 284 (H) 65 - 100 mg/dL BUN 36 (H) 6 - 20 MG/DL Creatinine 1.73 (H) 0.70 - 1.30 MG/DL  
 BUN/Creatinine ratio 21 (H) 12 - 20 GFR est AA 49 (L) >60 ml/min/1.73m2 GFR est non-AA 41 (L) >60 ml/min/1.73m2 Calcium 8.2 (L) 8.5 - 10.1 MG/DL  
GLUCOSE, POC Collection Time: 07/07/18  4:20 AM  
Result Value Ref Range Glucose (POC) 199 (H) 65 - 100 mg/dL Performed by [place] Collection Time: 07/07/18  4:21 AM  
Result Value Ref Range Glucose 199 mg/dL Insulin order 0.7 units/hour Insulin adminstered 0.7 units/hour Multiplier 0.005 Low target 150 mg/dL High target 250 mg/dL D50 order 0.0 ml  
 D50 administered 0.00 ml Minutes until next BG 60 min Order initials ca Administered initials ca GLSCOM Comments GLUCOSE, POC Collection Time: 07/07/18  5:30 AM  
Result Value Ref Range Glucose (POC) 240 (H) 65 - 100 mg/dL Performed by [place] Collection Time: 07/07/18  5:30 AM  
Result Value Ref Range Glucose 240 mg/dL Insulin order 0.9 units/hour Insulin adminstered 0.9 units/hour Multiplier 0.005 Low target 150 mg/dL High target 250 mg/dL D50 order 0.0 ml  
 D50 administered 0.00 ml Minutes until next BG 60 min Order initials ca Administered initials ca GLSCOM Comments GLUCOSE, POC Collection Time: 07/07/18  6:30 AM  
Result Value Ref Range Glucose (POC) 192 (H) 65 - 100 mg/dL Performed by [place] Collection Time: 07/07/18  6:31 AM  
Result Value Ref Range Glucose 192 mg/dL Insulin order 0.7 units/hour Insulin adminstered 0.7 units/hour Multiplier 0.005 Low target 150 mg/dL High target 250 mg/dL D50 order 0.0 ml  
 D50 administered 0.00 ml Minutes until next  min Order initials ca Administered initials ca GLSCOM Comments Assessment:  
 
Active Problems: 
  Diabetic ketoacidosis without coma associated with type [facility] (1/12/2013) Type 2 diabetes with nephropathy ([facility] (3/13/2018) Hyperglycemia (7/6/2018) NANDA (acute kidney injury) ([facility] (7/6/2018) Plan:  
 
Feeling better,labs improving. Will start nph bid ,diet

## 2018-07-07 NOTE — PROGRESS NOTES
Pt to discharge home today by private vehicle with family. Pt has no PT/OT needs, no H2H diagnosis. CM unable to schedule PCP f/u appointment at this time due to weekend. Pt to schedule PCP f/u appointment. Information placed on AVS.  
 
Pt has no additional CM needs. Care Management Interventions PCP Verified by CM: Yes Mode of Transport at Discharge: Other (see comment) (by private vehicle with family) Transition of Care Consult (CM Consult): Discharge Planning Discharge Durable Medical Equipment: No 
Health Maintenance Reviewed: Yes Physical Therapy Consult: No 
Occupational Therapy Consult: No 
Speech Therapy Consult: No 
Current Support Network: Lives with Spouse Confirm Follow Up Transport: Self Plan discussed with Pt/Family/Caregiver: Yes Discharge Location Discharge Placement: Home with family assistance TRINO Patiño [employer] in Social Work, NOT[place] [facility] [phone]

## 2018-07-07 NOTE — DISCHARGE INSTRUCTIONS
PATIENT DISCHARGE INSTRUCTIONS      PATIENT DISCHARGE INSTRUCTIONS    Elliot Dickey / [id number] : 1960    Admitted 2018 Discharged: 2018       · It is important that you take the medication exactly as they are prescribed. · Keep your medication in the bottles provided by the pharmacist and keep a list of the medication names, dosages, and times to be taken in your wallet. · Do not take other medications without consulting your doctor.      What to do at Home    Recommended Diet: Diabetic Diet    Recommended Activity: Activity as tolerated    If you experience any of the following symptoms nausea,vomiting, please follow up with Maurice Heller MD  .        Signed By: Maurice Heller MD     2018

## 2018-07-10 ENCOUNTER — OFFICE VISIT (OUTPATIENT)
Dept: FAMILY MEDICINE CLINIC | Age: 58
End: 2018-07-10

## 2018-07-10 VITALS
TEMPERATURE: 97.7 F | OXYGEN SATURATION: 100 % | HEIGHT: 68 IN | HEART RATE: 73 BPM | RESPIRATION RATE: 14 BRPM | WEIGHT: 168.8 LBS | DIASTOLIC BLOOD PRESSURE: 59 MMHG | SYSTOLIC BLOOD PRESSURE: 119 MMHG | BODY MASS INDEX: 25.58 KG/M2

## 2018-07-10 DIAGNOSIS — E87.5 HYPERKALEMIA: ICD-10-CM

## 2018-07-10 DIAGNOSIS — Z79.4 CONTROLLED TYPE 2 DIABETES MELLITUS WITH STAGE 3 CHRONIC KIDNEY DISEASE, WITH LONG-TERM CURRENT USE OF INSULIN (HCC): Primary | ICD-10-CM

## 2018-07-10 DIAGNOSIS — E11.22 CONTROLLED TYPE 2 DIABETES MELLITUS WITH STAGE 3 CHRONIC KIDNEY DISEASE, WITH LONG-TERM CURRENT USE OF INSULIN (HCC): Primary | ICD-10-CM

## 2018-07-10 DIAGNOSIS — N18.30 CONTROLLED TYPE 2 DIABETES MELLITUS WITH STAGE 3 CHRONIC KIDNEY DISEASE, WITH LONG-TERM CURRENT USE OF INSULIN (HCC): Primary | ICD-10-CM

## 2018-07-10 NOTE — MR AVS SNAPSHOT
[address] [facility] 
 
 
 [address] [facility] [place] [address] [address] 
[phone] Patient: Saskia Craig MRN: [id number] Visit Information Date & Time Provider Department Dept. Phone Encounter #  
 7/10/2018 11:15 AM Ragini Arora MD [facility] [phone] [id number] Your Appointments 7/23/2018  9:30 AM  
ROUTINE CARE with Ragini Arora MD  
[address]) Appt Note: f/u  
 [address] [facility] [place] [address] [address]  
[phone] [address] [phone]  
  
    
 10/15/2018  3:10 PM  
Follow Up with Alvaro Roblero MD  
[facility] [address] [place] [address]) Appt Note: f/u visit [facility] [address] [phone] [address] [facility] Upcoming Health Maintenance Date Due  
 EYE EXAM RETINAL OR DILATED Q1 3/27/2019* Influenza Age 5 to Adult 8/1/2018 MICROALBUMIN Q1 11/14/2018 LIPID PANEL Q1 11/14/2018 FOOT EXAM Q1 12/4/2018 HEMOGLOBIN A1C Q6M 1/6/2019 COLONOSCOPY 4/10/2019 Pneumococcal 19-64 Highest Risk (3 of 3 - PPSV23) 9/15/2021 DTaP/Tdap/Td series (2 - Td) 9/15/2026 *Topic was postponed. The date shown is not the original due date. Allergies as of 7/10/2018  Review Complete On: 7/10/2018 By: Ragini Arora MD  
  
 Severity Noted Reaction Type Reactions Benazepril Low 09/21/2015   Side Effect Other (comments)  
 hyperkalemia Current Immunizations  Reviewed on 1/18/2013 Name Date Influenza Vaccine 10/31/2013, 10/29/2012 Influenza Vaccine Split 9/30/2010 Influenza Vaccine Whole 11/28/2011 Pneumococcal Vaccine (Pcv) 10/30/2004 Not reviewed this visit You Were Diagnosed With   
  
 Codes Comments Controlled type 2 diabetes mellitus with stage 3 chronic kidney disease, with long-term current use of insulin (HCC)    -  Primary ICD-10-CM: E11.22, N18.3, Z79.4 ICD-9-CM: 250.40, 585.3, V58.67 Vitals BP Pulse Temp Resp Height(growth percentile) Weight(growth percentile) 119/59 73 97.7 °F (36.5 °C) (Oral) 14 5' 8\" (1.727 m) 168 lb 12.8 oz (76.6 kg) SpO2 BMI Smoking Status 100% 25.67 kg/m2 Never Smoker Vitals History BMI and BSA Data Body Mass Index Body Surface Area  
 25.67 kg/m 2 1.92 m 2 Preferred Pharmacy Pharmacy Name Phone [id number] [phone] Your Updated Medication List  
  
   
This list is accurate as of 7/10/18 12:19 PM.  Always use your most recent med list. amLODIPine 5 mg tablet Commonly known as:  [place] TAKE 2 TABLETS BY MOUTH DAILY  
  
 atorvastatin 80 mg tablet Commonly known as:  LIPITOR  
TAKE 1 TABLET BY MOUTH EVERY DAY  
  
 benazepril 20 mg tablet Commonly known as:  LOTENSIN Take 1 Tab by mouth daily. CIALIS 20 mg tablet Generic drug:  tadalafil TAKE 1 TABLET BY MOUTH 1 HOUR PRIOR TO SEX ON AN EMPTY STOMACH -Do NOT take if you are currently taking nitrates. doxazosin 2 mg tablet Commonly known as:  CARDURA Take 0.5 Tabs by mouth nightly. * HumaLOG Mix 75-25(U-100)Insuln 100 unit/mL (75-25) injection Generic drug:  insulin lispro protamine/insulin lispro 30 Units by SubCUTAneous route daily. * HumaLOG Mix 75-25(U-100)Insuln 100 unit/mL (75-25) injection Generic drug:  insulin lispro protamine/insulin lispro 20 Units by SubCUTAneous route every evening. * HumaLOG Mix 75-25(U-100)Insuln 100 unit/mL (75-25) injection Generic drug:  insulin lispro protamine/insulin lispro  
by SubCUTAneous route See Admin Instructions.  Patient also does a sliding scale, but was not sure of the exact scale.  
  
 hydroCHLOROthiazide 12.5 mg tablet Commonly known as:  HYDRODIURIL Take 1 Tab by mouth daily. For blood pressure  
  
 ondansetron 4 mg disintegrating tablet Commonly known as:  ZOFRAN ODT Take 1 Tab by mouth every eight (8) hours as needed for Nausea. * Notice: This list has 3 medication(s) that are the same as other medications prescribed for you. Read the directions carefully, and ask your doctor or other care provider to review them with you. We Performed the Following METABOLIC PANEL, BASIC [94005 CPT(R)] Introducing [facility] SERVICES! Wilma Osborne introduces [employer] patient portal. Now you can access parts of your medical record, email your doctor's office, and request medication refills online. 1. In your internet browser, go to [email or web address] 2. Click on the First Time User? Click Here link in the Sign In box. You will see the New Member Sign Up page. 3. Enter your [employer] Access Code exactly as it appears below. You will not need to use this code after youve completed the sign-up process. If you do not sign up before the expiration date, you must request a new code. · [employer] Access Code: [email or web address] Expires: 10/4/2018 10:46 AM 
 
4. Enter the last four digits of your Social Security Number (xxxx) and Date of Birth (mm/dd/yyyy) as indicated and click Submit. You will be taken to the next sign-up page. 5. Create a [employer] ID. This will be your [employer] login ID and cannot be changed, so think of one that is secure and easy to remember. 6. Create a [employer] password. You can change your password at any time. 7. Enter your Password Reset Question and Answer. This can be used at a later time if you forget your password. 8. Enter your e-mail address. You will receive e-mail notification when new information is available in [address] 9. Click Sign Up. You can now view and download portions of your medical record. 10. Click the Download Summary menu link to download a portable copy of your medical information. If you have questions, please visit the Frequently Asked Questions section of the [employer] website. Remember, [employer] is NOT to be used for urgent needs. For medical emergencies, dial 911. Now available from your iPhone and Android! Please provide this summary of care documentation to your next provider. Your primary care clinician is listed as Jessa Santos. If you have any questions after today's visit, please call [phone].

## 2018-07-10 NOTE — PROGRESS NOTES
HISTORY OF PRESENT ILLNESS  Troy Gorman is a 59 y.o. male. HPI In for hospital followup. Had an episode of nausea, anorexia, vomiting, blood sugars were high and potassium was high. Is also followed by Dr. Shine Covington for diabetes, since January of last year. Also does a sliding scale, twice a day. Overall is feeling much better, no nausea, fever, chills, diarrhea or anorexia. Blood sugar was in the 400s on admission and apparently K was 5.9 at Urgent Care  Past Medical History:   Diagnosis Date    ARF (acute renal failure) ([facility] 1/11/2013    Arthritis     knees    CKD stage 3 due to type 2 diabetes mellitus ([facility]     Diabetes ([facility] 1982    Type 1    [facility]di[facility]acidoses[facility] ([facility]     Hyperaldosteronism ([facility]     s/p surgery    Hypercholesterolemia 11/30/2010    Hypertension 11/30/2010    Ventricular bigeminy 6/29/2011       Social History     Social History    Marital status:      Spouse name: N/A    Number of children: 1    Years of education: N/A     Social History Main Topics    Smoking status: Never Smoker    Smokeless tobacco: Never Used    Alcohol use 0.0 oz/week     0 Standard drinks or equivalent per week      Comment: may have beer once a week or mixed drink 2 times a month    Drug use: No    Sexual activity: Yes     Partners: Female     Birth control/ protection: None     Other Topics Concern    None     Social History Narrative    L[place] in [place] with wife and mother-in-law and 25year old daughter. Works at [employer] for 37 years as a . Likes to do yardwork. Current Outpatient Prescriptions   Medication Sig Dispense Refill    insulin lispro protamine/insulin lispro (HUMALOG MIX 75-25,U-100,INSULN) 100 unit/mL (75-25) injection 30 Units by SubCUTAneous route daily.  insulin lispro protamine/insulin lispro (HUMALOG MIX 75-25,U-100,INSULN) 100 unit/mL (75-25) injection 20 Units by SubCUTAneous route every evening.       insulin lispro protamine/insulin lispro (HUMALOG MIX 75-25,U-100,INSULN) 100 unit/mL (75-25) injection by SubCUTAneous route See Admin Instructions. Patient also does a sliding scale, but was not sure of the exact scale.  amLODIPine (NORVASC) 5 mg tablet TAKE 2 TABLETS BY MOUTH DAILY 180 Tab 3    CIALIS 20 mg tablet TAKE 1 TABLET BY MOUTH 1 HOUR PRIOR TO SEX ON AN EMPTY STOMACH -Do NOT take if you are currently taking nitrates. 5 Tab 5    benazepril (LOTENSIN) 20 mg tablet Take 1 Tab by mouth daily. 30 Tab 11    hydroCHLOROthiazide (HYDRODIURIL) 12.5 mg tablet Take 1 Tab by mouth daily. For blood pressure 90 Tab 3    atorvastatin (LIPITOR) 80 mg tablet TAKE 1 TABLET BY MOUTH EVERY DAY 90 Tab 1    doxazosin (CARDURA) 2 mg tablet Take 0.5 Tabs by mouth nightly. 45 Tab 3    ondansetron (ZOFRAN ODT) 4 mg disintegrating tablet Take 1 Tab by mouth every eight (8) hours as needed for Nausea. 10 Tab 1          ROS    Physical Exam   Constitutional: He appears well-developed and well-nourished. HENT:   Right Ear: External ear normal.   Left Ear: External ear normal.   Mouth/Throat: Oropharynx is clear and moist.   Neck: No thyromegaly present. Cardiovascular: Normal rate, regular rhythm, normal heart sounds and intact distal pulses. Pulmonary/Chest: Effort normal and breath sounds normal. No respiratory distress. He has no wheezes. Abdominal: Soft. Bowel sounds are normal. He exhibits no distension and no mass. There is no tenderness. There is no guarding. Musculoskeletal: Normal range of motion. He exhibits no edema. Lymphadenopathy:     He has no cervical adenopathy. Nursing note and vitals reviewed. ASSESSMENT and PLAN  Orders Placed This Encounter    METABOLIC PANEL, BASIC     Orders Placed This Encounter    METABOLIC PANEL, BASIC     Diagnoses and all orders for this visit:    1.  Controlled type 2 diabetes mellitus with stage 3 chronic kidney disease, with long-term current use of insulin ([address]  - METABOLIC PANEL, BASIC    2.  Hyperkalemia

## 2018-07-10 NOTE — PROGRESS NOTES
Chief Complaint   Patient presents with    High Blood Sugar    ED Follow-up     hyperglycemia  [facility]   7/6/18     1. Have you been to the ER, urgent care clinic since your last visit? Hospitalized since your last visit? No    2. Have you seen or consulted any other health care providers outside of the [address] [place] [address] since your last visit? Include any pap smears or colon screening. No     There are no preventive care reminders to display for this patient.

## 2018-07-11 LAB
BUN SERPL-MCNC: 27 MG/DL (ref 6–24)
BUN/CREAT SERPL: 18 (ref 9–20)
CALCIUM SERPL-MCNC: 9.4 MG/DL (ref 8.7–10.2)
CHLORIDE SERPL-SCNC: 101 MMOL/L (ref 96–106)
CO2 SERPL-SCNC: 21 MMOL/L (ref 20–29)
CREAT SERPL-MCNC: 1.49 MG/DL (ref 0.76–1.27)
GLUCOSE SERPL-MCNC: 463 MG/DL (ref 65–99)
INTERPRETATION: NORMAL
Lab: NORMAL
POTASSIUM SERPL-SCNC: 5.1 MMOL/L (ref 3.5–5.2)
SODIUM SERPL-SCNC: 137 MMOL/L (ref 134–144)

## 2018-07-16 NOTE — PROGRESS NOTES
pc with pt blood sugars are running around 400. Will increase Humalog 75/25 to 40 units in am and 30 units in pm He has an appt with Dr. Hemanth Posey in October. If sugars dont come down to 150-200, to call me back.

## 2018-07-31 RX ORDER — ATORVASTATIN CALCIUM 80 MG/1
TABLET, FILM COATED ORAL
Qty: 90 TAB | Refills: 3 | Status: SHIPPED | OUTPATIENT
Start: 2018-07-31 | End: 2019-09-10 | Stop reason: SDUPTHER

## 2018-07-31 RX ORDER — ATORVASTATIN CALCIUM 80 MG/1
TABLET, FILM COATED ORAL
Qty: 90 TAB | Refills: 1 | Status: SHIPPED | OUTPATIENT
Start: 2018-07-31 | End: 2018-10-15 | Stop reason: SDUPTHER

## 2018-07-31 NOTE — TELEPHONE ENCOUNTER
Last Visit: 7/10-Hernesto Puente Appt: None  Previous Refill Encounter: 4/21/17-90+1    Requested Prescriptions     Pending Prescriptions Disp Refills    atorvastatin (LIPITOR) 80 mg tablet 90 Tab 3     Sig: TAKE 1 TABLET BY MOUTH EVERY DAY

## 2018-09-11 NOTE — TELEPHONE ENCOUNTER
----- Message from Pat Tamiko sent at 9/10/2018  5:22 PM EDT -----  Regarding: Dr. Duane Horowitz requesting a refill on \"Humalog Insulin\" [facility] . Pt has run out of insulin.    Best contact number [phone]

## 2018-09-11 NOTE — TELEPHONE ENCOUNTER
Patient needs a refill on Humalog 75/25 vial. Patient stated that the pharmacy has been trying to obtain the refill for over a week and now he is out of medication. Patient would like a call at [phone] when the prescription is sent in so he can pick it up.

## 2018-09-25 NOTE — TELEPHONE ENCOUNTER
Patient is waiting for this prescription:    nsulin lispro protamine/insulin lispro (HUMALOG MIX 75-25,U-100,INSULN) 100 unit/mL (75-25) injection

## 2018-09-25 NOTE — TELEPHONE ENCOUNTER
Last Visit: 7/10-Hernesto Puente Appt: None  Previous Refill Encounter: -2+12 (Mail Order    Requested Prescriptions     Pending Prescriptions Disp Refills    insulin lispro protamine/insulin lispro (HUMALOG MIX 75-25,U-100,INSULN) 100 unit/mL (75-25) injection 2 Vial 12     Si units in am and 30 units in pm. For diabetes.

## 2018-10-03 LAB
BASE DEFICIT BLDV-SCNC: 4.8 MMOL/L
BDY SITE: ABNORMAL
BREATHS.SPONTANEOUS ON VENT: 18
HCO3 BLDV-SCNC: 23 MMOL/L (ref 23–28)
PCO2 BLDV: 50 MMHG (ref 41–51)
PH BLDV: 7.27 [PH] (ref 7.32–7.42)
PO2 BLDV: 26 MMHG (ref 25–40)
SAO2% DEVICE SAO2% SENSOR NAME: ABNORMAL
SPECIMEN SITE: ABNORMAL

## 2018-10-15 ENCOUNTER — OFFICE VISIT (OUTPATIENT)
Dept: ENDOCRINOLOGY | Age: 58
End: 2018-10-15

## 2018-10-15 VITALS
BODY MASS INDEX: 25.94 KG/M2 | WEIGHT: 171.2 LBS | DIASTOLIC BLOOD PRESSURE: 87 MMHG | HEART RATE: 68 BPM | SYSTOLIC BLOOD PRESSURE: 170 MMHG | HEIGHT: 68 IN

## 2018-10-15 DIAGNOSIS — E78.00 HYPERCHOLESTEROLEMIA: ICD-10-CM

## 2018-10-15 DIAGNOSIS — E11.21 TYPE 2 DIABETES WITH NEPHROPATHY (HCC): Primary | ICD-10-CM

## 2018-10-15 DIAGNOSIS — I10 ESSENTIAL HYPERTENSION, BENIGN: ICD-10-CM

## 2018-10-15 LAB — HBA1C MFR BLD HPLC: 8.2 %

## 2018-10-15 RX ORDER — HYDROCHLOROTHIAZIDE 12.5 MG/1
12.5 TABLET ORAL DAILY
Qty: 90 TAB | Refills: 3 | Status: SHIPPED | OUTPATIENT
Start: 2018-10-15 | End: 2019-09-10 | Stop reason: SDUPTHER

## 2018-10-15 RX ORDER — AMLODIPINE AND BENAZEPRIL HYDROCHLORIDE 5; 20 MG/1; MG/1
1 CAPSULE ORAL DAILY
Qty: 90 CAP | Refills: 3 | Status: SHIPPED | OUTPATIENT
Start: 2018-10-15 | End: 2019-03-18 | Stop reason: DRUGHIGH

## 2018-10-15 RX ORDER — FLASH GLUCOSE SENSOR
KIT MISCELLANEOUS
Qty: 3 KIT | Refills: 11 | Status: SHIPPED | OUTPATIENT
Start: 2018-10-15 | End: 2019-09-10

## 2018-10-15 RX ORDER — FLASH GLUCOSE SENSOR
KIT MISCELLANEOUS
Qty: 1 EACH | Refills: 0 | Status: SHIPPED | OUTPATIENT
Start: 2018-10-15 | End: 2019-09-10

## 2018-10-15 NOTE — MR AVS SNAPSHOT
[address] [facility] [address] [address] [phone] Patient: Chandra Gomez MRN:  [id number] Visit Information Date & Time Provider Department Dept. Phone Encounter #  
 10/15/2018  3:10 PM Yahaira Gonzales, [address] [facility] [phone] [id number] Follow-up Instructions Return in about 5 months (around 3/15/2019). Upcoming Health Maintenance Date Due Shingrix Vaccine Age 50> (1 of 2) 11/20/2008 Influenza Age 5 to Adult 8/1/2018 MICROALBUMIN Q1 11/14/2018 LIPID PANEL Q1 11/14/2018 COLONOSCOPY 4/10/2019 EYE EXAM RETINAL OR DILATED Q1 3/27/2019* FOOT EXAM Q1 12/4/2018 HEMOGLOBIN A1C Q6M 1/6/2019 Pneumococcal 19-64 Highest Risk (3 of 3 - PPSV23) 9/15/2021 DTaP/Tdap/Td series (2 - Td) 9/15/2026 *Topic was postponed. The date shown is not the original due date. Allergies as of 10/15/2018  Review Complete On: 10/15/2018 By: Yahaira Gonzales MD  
  
 Severity Noted Reaction Type Reactions Benazepril Low 09/21/2015   Side Effect Other (comments)  
 hyperkalemia Current Immunizations  Reviewed on 1/18/2013 Name Date Influenza Vaccine 10/31/2013, 10/29/2012 Influenza Vaccine Split 9/30/2010 Influenza Vaccine Whole 11/28/2011 Pneumococcal Vaccine (Pcv) 10/30/2004 Not reviewed this visit You Were Diagnosed With   
  
 Codes Comments Type 2 diabetes with nephropathy (HCC)    -  Primary ICD-10-CM: E11.21 
ICD-9-CM: 250.40, 583.81 Essential hypertension, benign     ICD-10-CM: I10 
ICD-9-CM: 401.1 Hypercholesterolemia     ICD-10-CM: E78.00 ICD-9-CM: 272.0 Vitals BP Pulse Height(growth percentile) Weight(growth percentile) BMI Smoking Status 170/87 68 5' 8\" (1.727 m) 171 lb 3.2 oz (77.7 kg) 26.03 kg/m2 Never Smoker Vitals History BMI and BSA Data Body Mass Index Body Surface Area 26.03 kg/m 2 1.93 m 2 Preferred Pharmacy Pharmacy Name Phone [facility], [address] [phone] Your Updated Medication List  
  
   
This list is accurate as of 10/15/18  4:29 PM.  Always use your most recent med list. amLODIPine-benazepril 5-20 mg per capsule Commonly known as:  Monika Nilesua Take 1 Cap by mouth daily. Replaces amlodipine  
  
 atorvastatin 80 mg tablet Commonly known as:  LIPITOR  
TAKE 1 TABLET BY MOUTH EVERY DAY  
  
 CIALIS 20 mg tablet Generic drug:  tadalafil TAKE 1 TABLET BY MOUTH 1 HOUR PRIOR TO SEX ON AN EMPTY STOMACH -Do NOT take if you are currently taking nitrates. doxazosin 2 mg tablet Commonly known as:  CARDURA Take 0.5 Tabs by mouth nightly. hydroCHLOROthiazide 12.5 mg tablet Commonly known as:  HYDRODIURIL Take 1 Tab by mouth daily. For blood pressure  
  
 insulin lispro protamine/insulin lispro 100 unit/mL (75-25) injection Commonly known as:  HumaLOG Mix 75-25(U-100)Insuln 30 units in am and 24 units in pm + 2 units for every 50 mg/dl above 150 mg/dl--Dose change 10/15/18--updated med list--did not send prescription to the pharmacy ONETOUCH ULTRA BLUE TEST STRIP strip Generic drug:  glucose blood VI test strips USE AS DIRECTED Prescriptions Sent to Pharmacy Refills  
 amLODIPine-benazepril (LOTREL) 5-20 mg per capsule 3 Sig: Take 1 Cap by mouth daily. Replaces amlodipine Class: Normal  
 Pharmacy: [address] [place] [address] [id number]  #: [phone] Route: Oral  
  
We Performed the Following AMB POC HEMOGLOBIN A1C [55465 CPT(R)] HEMOGLOBIN A1C WITH EAG [84703 CPT(R)] LIPID PANEL [98090 CPT(R)] METABOLIC PANEL, COMPREHENSIVE [65585 CPT(R)] MICROALBUMIN, UR, RAND W/ MICROALB/CREAT RATIO [id number] CPT(R)] Follow-up Instructions Return in about 5 months (around 3/15/2019). Patient Instructions 1) Stop the cardura (doxazosin) and amlodipine. We will start a combination pill called lotrel that has 5 mg of amlodipine and 20 mg of benazepril to take 1 tab daily in the morning. This was sent to mail order so start this regimen when it arrives. 2) Your Hemoglobin A1c is a 3 month marker of your diabetes control. Goal is less than 7% which means your average blood sugar is less than 150. Your Hemoglobin A1c is 8.2% which means your diabetes is under better control than 11.3% at your last check. Continue to work on your diet and exercise and take all your medications as directed. 3) I will call you or send you a letter with your lab results. 4) Start monitoring blood pressure about 2-3 times per week at alternating times either in the morning or evening after resting for 5 minutes and sitting upright in a chair with your arm at heart level. Please let me know if you are having readings over 140 on the top number or 90 on the bottom number after 2 weeks on the new pill. 5) Feel free to go up or down on the scale based on what you actually are eating--more carbs take 2-6 more units, less carbs 2-6 less units. Introducing [facility]! [employer] introduces [employer] patient portal. Now you can access parts of your medical record, email your doctor's office, and request medication refills online. 1. In your internet browser, go to [email or web address] 2. Click on the First Time User? Click Here link in the Sign In box. You will see the New Member Sign Up page. 3. Enter your [employer] Access Code exactly as it appears below. You will not need to use this code after youve completed the sign-up process. If you do not sign up before the expiration date, you must request a new code. · [employer] Access Code: [email or web address] Expires: 1/13/2019  4:29 PM 
 
4.  Enter the last four digits of your Social Security Number (xxxx) and Date of Birth (mm/dd/yyyy) as indicated and click Submit. You will be taken to the next sign-up page. 5. Create a [employer] ID. This will be your [employer] login ID and cannot be changed, so think of one that is secure and easy to remember. 6. Create a [employer] password. You can change your password at any time. 7. Enter your Password Reset Question and Answer. This can be used at a later time if you forget your password. 8. Enter your e-mail address. You will receive e-mail notification when new information is available in [address] 9. Click Sign Up. You can now view and download portions of your medical record. 10. Click the Download Summary menu link to download a portable copy of your medical information. If you have questions, please visit the Frequently Asked Questions section of the [employer] website. Remember, [employer] is NOT to be used for urgent needs. For medical emergencies, dial 911. Now available from your iPhone and Android! Please provide this summary of care documentation to your next provider. Your primary care clinician is listed as Melinda Ma. If you have any questions after today's visit, please call [phone].

## 2018-10-15 NOTE — PATIENT INSTRUCTIONS
1) Stop the cardura (doxazosin) and amlodipine. We will start a combination pill called lotrel that has 5 mg of amlodipine and 20 mg of benazepril to take 1 tab daily in the morning. This was sent to mail order so start this regimen when it arrives. 2) Your Hemoglobin A1c is a 3 month marker of your diabetes control. Goal is less than 7% which means your average blood sugar is less than 150. Your Hemoglobin A1c is 8.2% which means your diabetes is under better control than 11.3% at your last check. Continue to work on your diet and exercise and take all your medications as directed. 3) I will call you or send you a letter with your lab results. 4) Start monitoring blood pressure about 2-3 times per week at alternating times either in the morning or evening after resting for 5 minutes and sitting upright in a chair with your arm at heart level. Please let me know if you are having readings over 140 on the top number or 90 on the bottom number after 2 weeks on the new pill. 5) Feel free to go up or down on the scale based on what you actually are eating--more carbs take 2-6 more units, less carbs 2-6 less units.

## 2018-10-15 NOTE — PROGRESS NOTES
Chief Complaint   Patient presents with    Diabetes     pcp and pharmacy confirmed    Other     labs drawn today     History of Present Illness: Zachariah Grant is a 59 y.o. male here for follow up of diabetes. Weight down 22 lbs since last visit in 12/17. Had 2 ER visit in 3/18 and then needed right 2nd toe amputation for an infection. In 7/18 was admitted for dehydration from the stomach bug overnight and was discharged. Has mostly been following my scale that I gave him in 12/17 but will sometimes take less based on what he is eating. Mostly checking 2 times per day over the past 90 days and can be in the 100-180 range if he doesn't eat too many carbs but can be in the 200-400s if he does. Recalls taking benazepril after last visit for about a month but never refilled this. Not always getting his amlodipine and doxazosin and hctz everyday. Would like to consolidate his regimen to help with compliance. Interested in [employer] but unclear if this is covered so gave him a rx to check the cost.    Current Outpatient Prescriptions   Medication Sig    insulin lispro protamine/insulin lispro (HUMALOG MIX 75-25,U-100,INSULN) 100 unit/mL (75-25) injection 30 units in am and 24 units in pm + 2 units for every 50 mg/dl above 150 mg/dl--Dose change 10/15/18--updated med list--did not send prescription to the pharmacy    ONETOUCH ULTRA BLUE TEST STRIP strip USE AS DIRECTED    atorvastatin (LIPITOR) 80 mg tablet TAKE 1 TABLET BY MOUTH EVERY DAY    amLODIPine (NORVASC) 5 mg tablet TAKE 2 TABLETS BY MOUTH DAILY    CIALIS 20 mg tablet TAKE 1 TABLET BY MOUTH 1 HOUR PRIOR TO SEX ON AN EMPTY STOMACH -Do NOT take if you are currently taking nitrates.  hydroCHLOROthiazide (HYDRODIURIL) 12.5 mg tablet Take 1 Tab by mouth daily. For blood pressure    doxazosin (CARDURA) 2 mg tablet Take 0.5 Tabs by mouth nightly. No current facility-administered medications for this visit.       Allergies   Allergen Reactions  Benazepril Other (comments)     hyperkalemia     Review of Systems:  - Eyes: no blurry vision or double vision  - Cardiovascular: no chest pain  - Respiratory: no shortness of breath  - Musculoskeletal: no myalgias  - Neurological: no numbness/tingling in extremities    Physical Examination:  Blood pressure 170/87, pulse 68, height 5' 8\" (1.727 m), weight 171 lb 3.2 oz (77.7 kg). - General: pleasant, no distress, good eye contact   - Neck: no carotid bruits  - Cardiovascular: regular, normal rate, nl s1 and s2, no m/r/g,   - Respiratory: clear bilaterally  - Integumentary: no edema,   - Psychiatric: normal mood and affect    Data Reviewed:   Component      Latest Ref Rng & Units 10/15/2018 7/10/2018 7/6/2018           4:00 PM 12:28 PM  1:01 PM   Glucose      65 - 99 mg/dL  463 (H)    BUN      6 - 24 mg/dL  27 (H)    Creatinine      0.76 - 1.27 mg/dL  1.49 (H)    GFR est non-AA      >59 mL/min/1.73  51 (L)    GFR est AA      >59 mL/min/1.73  59 (L)    BUN/Creatinine ratio      9 - 20  18    Sodium      134 - 144 mmol/L  137    Potassium      3.5 - 5.2 mmol/L  5.1    Chloride      96 - 106 mmol/L  101    CO2      20 - 29 mmol/L  21    Calcium      8.7 - 10.2 mg/dL  9.4    Hemoglobin A1c, (calculated)      4.2 - 6.3 %   11.3 (H)   Est. average glucose      mg/dL   278   Hemoglobin A1c (POC)      % 8.2         Assessment/Plan:     1. Type 1 diabetes, uncontrolled, with retinopathy ([facility]): his most recent Hgb A1c was 8.2% in 10/18 down from 11.3% in 7/18 up from 8.3% in 11/17 down from 8.5% in 9/17 down from 9% in 6/17 up from 8.3% in 2/17 down from 9.5% in 10/16 stable from 7/16 down from 10% in 3/16. Control is improving but still not following scale as directed. Told him he can go up or down by 2-6 units from the scale based on the amount of carbs he is eating. May benefit from basal/bolus regimen in the future but has been on pre-mix the whole time so will continue this for now.   - cont humalog 75/25 mix insulin 30 units before breakfast and 24 units before dinner plus 2 units for every 50 mg/dl above 150 mg/dl   - check bs 2-3 times per day  - foot exam done 12/17  - microalbumin 106 in 3/16, down to 57 in 10/16 and stable in 6/17, up to 109 in 11/17--will add back 20 of benazepril but will be careful of hyperkalemia as had this in the past  - due for eye exam  - check Hgb A1c, cmp, and microalbumin prior to next visit      2. Essential hypertension, benign: his BP was above goal < 140/90 and given rise in microalbumin last year, will add back benazepril. Will stop cardura and lower amlodipine and combine with benazepril to simplify regimen. Does have history of hyperaldosteronism that was surgically treated by left adrenalectomy in 3/12.  - stop amlodipine 5 mg 2 tabs daily  - stop doxazosin 2 mg 1/2 tab at bedtime  - begin lotrel 5/20 mg daily  - cont hctz 12.5 mg daily      3. Hypercholesterolemia: Given DM, Goal LDL < 100, non-HDL < 130, and TG < 150.  in 3/16. Up to 171 in 7/16 with non-compliance but down to 93 in 10/16 with compliance and 94 in 3/17 and 110 in 6/17 and 94 in 9/17 and 112 in 11/17  - cont lipitor 80 mg daily  - check lipids today and prior to next visit  - check cmp today       Patient Instructions   1) Stop the cardura (doxazosin) and amlodipine. We will start a combination pill called lotrel that has 5 mg of amlodipine and 20 mg of benazepril to take 1 tab daily in the morning. This was sent to mail order so start this regimen when it arrives. 2) Your Hemoglobin A1c is a 3 month marker of your diabetes control. Goal is less than 7% which means your average blood sugar is less than 150. Your Hemoglobin A1c is 8.2% which means your diabetes is under better control than 11.3% at your last check. Continue to work on your diet and exercise and take all your medications as directed. 3) I will call you or send you a letter with your lab results.     4) Start monitoring blood pressure about 2-3 times per week at alternating times either in the morning or evening after resting for 5 minutes and sitting upright in a chair with your arm at heart level. Please let me know if you are having readings over 140 on the top number or 90 on the bottom number after 2 weeks on the new pill. 5) Feel free to go up or down on the scale based on what you actually are eating--more carbs take 2-6 more units, less carbs 2-6 less units. Follow-up Disposition:  Return in about 5 months (around 3/15/2019). Copy sent to:  Dr. Jeromy Copeland via [employer] [facility]    Lab follow up: 10/17/18    Sent him the following message in a letter:    METABOLIC PANEL, COMPREHENSIVE   Result Value Ref Range    Glucose 337 (H) 65 - 99 mg/dL    BUN 24 6 - 24 mg/dL    Creatinine 1.38 (H) 0.76 - 1.27 mg/dL    GFR est non-AA 56 (L) >59 mL/min/1.73    GFR est AA 64 >59 mL/min/1.73    BUN/Creatinine ratio 17 9 - 20    Sodium 142 134 - 144 mmol/L    Potassium 5.1 3.5 - 5.2 mmol/L    Chloride 104 96 - 106 mmol/L    CO2 20 20 - 29 mmol/L    Calcium 9.1 8.7 - 10.2 mg/dL    Protein, total 7.0 6.0 - 8.5 g/dL    Albumin 4.2 3.5 - 5.5 g/dL    GLOBULIN, TOTAL 2.8 1.5 - 4.5 g/dL    A-G Ratio 1.5 1.2 - 2.2    Bilirubin, total 0.2 0.0 - 1.2 mg/dL    Alk.  phosphatase 113 39 - 117 IU/L    AST (SGOT) 17 0 - 40 IU/L    ALT (SGPT) 25 0 - 44 IU/L    Narrative    Performed at:  [address]  [id number]  : Nithya Child MD, Phone:  [phone]   LIPID PANEL   Result Value Ref Range    Cholesterol, total 194 100 - 199 mg/dL    Triglyceride 84 0 - 149 mg/dL    HDL Cholesterol 72 >39 mg/dL    VLDL, calculated 17 5 - 40 mg/dL    LDL, calculated 105 (H) 0 - 99 mg/dL    Narrative    Performed at:  [facility] [address] [place] [address]  [id number]  : Nithya Child MD, Phone:  [phone]     I wanted to update you on your recent lab results:    -------------------------------------------------------------------------------------------------------------------  Total Cholesterol is the total number of cholesterol particles in your blood. Goal is less than 200. Your value is at goal.    Triglycerides are the short term fats in your blood. Goal is less than 150. Your value is at goal.    HDL is the good cholesterol in your blood. Goal is more than 50. Your value is at goal.    LDL is the bad cholesterol in your blood. Goal is less than 100. Your value is just above goal but improved from 112 in November. Continue to follow a low cholesterol diet. Try to limit the amount of fried foods, fatty foods, butter, gravy, red meat, ice cream, cheese, and eggs in your diet, which are all high in cholesterol. Take all of your medications (atorvastatin) as directed.  -------------------------------------------------------------------------------------------------------------------  BUN and creatinine are markers of kidney function. Your creatinine is abnormal at 1.38 but improved from 1.49 when you were in the hospital in July.  -------------------------------------------------------------------------------------------------------------------  ALT and AST are markers of liver function.   Your values are normal.

## 2018-10-15 NOTE — LETTER
10/17/2018 10:55 PM 
 
Mr. Chandra Cotton [facility] 
[id number] Dear Chandra Gomez: Please find your most recent results below. Resulted Orders AMB POC HEMOGLOBIN A1C Result Value Ref Range Hemoglobin A1c (POC) 8.2 % METABOLIC PANEL, COMPREHENSIVE Result Value Ref Range Glucose 337 (H) 65 - 99 mg/dL BUN 24 6 - 24 mg/dL Creatinine 1.38 (H) 0.76 - 1.27 mg/dL GFR est non-AA 56 (L) >59 mL/min/1.73 GFR est AA 64 >59 mL/min/1.73  
 BUN/Creatinine ratio 17 9 - 20 Sodium 142 134 - 144 mmol/L Potassium 5.1 3.5 - 5.2 mmol/L Chloride 104 96 - 106 mmol/L  
 CO2 20 20 - 29 mmol/L Calcium 9.1 8.7 - 10.2 mg/dL Protein, total 7.0 6.0 - 8.5 g/dL Albumin 4.2 3.5 - 5.5 g/dL GLOBULIN, TOTAL 2.8 1.5 - 4.5 g/dL A-G Ratio 1.5 1.2 - 2.2 Bilirubin, total 0.2 0.0 - 1.2 mg/dL Alk. phosphatase 113 39 - 117 IU/L  
 AST (SGOT) 17 0 - 40 IU/L  
 ALT (SGPT) 25 0 - 44 IU/L Narrative Performed at:  [address]  [id number] : Naye Weller MD, Phone:  [phone] LIPID PANEL Result Value Ref Range Cholesterol, total 194 100 - 199 mg/dL Triglyceride 84 0 - 149 mg/dL HDL Cholesterol 72 >39 mg/dL VLDL, calculated 17 5 - 40 mg/dL LDL, calculated 105 (H) 0 - 99 mg/dL Narrative Performed at:  [address]  [id number] : Naye Weller MD, Phone:  [phone] I wanted to update you on your recent lab results: 
 
------------------------------------------------------------------------------------------------------------------- Total Cholesterol is the total number of cholesterol particles in your blood. Goal is less than 200. Your value is at goal. 
 
Triglycerides are the short term fats in your blood. Goal is less than 150.   Your value is at goal. 
 
 HDL is the good cholesterol in your blood. Goal is more than 50. Your value is at goal. 
 
LDL is the bad cholesterol in your blood. Goal is less than 100. Your value is just above goal but improved from 112 in November. Continue to follow a low cholesterol diet. Try to limit the amount of fried foods, fatty foods, butter, gravy, red meat, ice cream, cheese, and eggs in your diet, which are all high in cholesterol. Take all of your medications (atorvastatin) as directed. 
------------------------------------------------------------------------------------------------------------------- 
BUN and creatinine are markers of kidney function. Your creatinine is abnormal at 1.38 but improved from 1.49 when you were in the hospital in July. 
------------------------------------------------------------------------------------------------------------------- 
ALT and AST are markers of liver function. Your values are normal. 
------------------------------------------------------------------------------------------------------------------- If you have any questions regarding your bloodwork, please don't hesitate to call the office at [phone]. Otherwise I will see you back at your next appointment.  
 
Sincerely, 
 
 
 
Twyla Carvalho MD

## 2018-10-16 LAB
ALBUMIN SERPL-MCNC: 4.2 G/DL (ref 3.5–5.5)
ALBUMIN/GLOB SERPL: 1.5 {RATIO} (ref 1.2–2.2)
ALP SERPL-CCNC: 113 IU/L (ref 39–117)
ALT SERPL-CCNC: 25 IU/L (ref 0–44)
AST SERPL-CCNC: 17 IU/L (ref 0–40)
BILIRUB SERPL-MCNC: 0.2 MG/DL (ref 0–1.2)
BUN SERPL-MCNC: 24 MG/DL (ref 6–24)
BUN/CREAT SERPL: 17 (ref 9–20)
CALCIUM SERPL-MCNC: 9.1 MG/DL (ref 8.7–10.2)
CHLORIDE SERPL-SCNC: 104 MMOL/L (ref 96–106)
CHOLEST SERPL-MCNC: 194 MG/DL (ref 100–199)
CO2 SERPL-SCNC: 20 MMOL/L (ref 20–29)
CREAT SERPL-MCNC: 1.38 MG/DL (ref 0.76–1.27)
GLOBULIN SER CALC-MCNC: 2.8 G/DL (ref 1.5–4.5)
GLUCOSE SERPL-MCNC: 337 MG/DL (ref 65–99)
HDLC SERPL-MCNC: 72 MG/DL
INTERPRETATION, 910389: NORMAL
INTERPRETATION: NORMAL
LDLC SERPL CALC-MCNC: 105 MG/DL (ref 0–99)
Lab: NORMAL
PDF IMAGE, 910387: NORMAL
POTASSIUM SERPL-SCNC: 5.1 MMOL/L (ref 3.5–5.2)
PROT SERPL-MCNC: 7 G/DL (ref 6–8.5)
SODIUM SERPL-SCNC: 142 MMOL/L (ref 134–144)
TRIGL SERPL-MCNC: 84 MG/DL (ref 0–149)
VLDLC SERPL CALC-MCNC: 17 MG/DL (ref 5–40)

## 2018-12-24 RX ORDER — TADALAFIL 20 MG/1
TABLET ORAL
Qty: 5 TAB | Refills: 11 | Status: SHIPPED | OUTPATIENT
Start: 2018-12-24 | End: 2020-06-18 | Stop reason: ALTCHOICE

## 2019-03-14 LAB
ALBUMIN SERPL-MCNC: 4.4 G/DL (ref 3.6–4.8)
ALBUMIN/CREAT UR: 58.6 MG/G CREAT (ref 0–30)
ALBUMIN/GLOB SERPL: 1.6 {RATIO} (ref 1.2–2.2)
ALP SERPL-CCNC: 100 IU/L (ref 39–117)
ALT SERPL-CCNC: 23 IU/L (ref 0–44)
AST SERPL-CCNC: 21 IU/L (ref 0–40)
BILIRUB SERPL-MCNC: 0.4 MG/DL (ref 0–1.2)
BUN SERPL-MCNC: 25 MG/DL (ref 8–27)
BUN/CREAT SERPL: 19 (ref 10–24)
CALCIUM SERPL-MCNC: 9.8 MG/DL (ref 8.6–10.2)
CHLORIDE SERPL-SCNC: 110 MMOL/L (ref 96–106)
CHOLEST SERPL-MCNC: 217 MG/DL (ref 100–199)
CO2 SERPL-SCNC: 23 MMOL/L (ref 20–29)
CREAT SERPL-MCNC: 1.32 MG/DL (ref 0.76–1.27)
CREAT UR-MCNC: 102 MG/DL
EST. AVERAGE GLUCOSE BLD GHB EST-MCNC: 192 MG/DL
GLOBULIN SER CALC-MCNC: 2.8 G/DL (ref 1.5–4.5)
GLUCOSE SERPL-MCNC: 64 MG/DL (ref 65–99)
HBA1C MFR BLD: 8.3 % (ref 4.8–5.6)
HDLC SERPL-MCNC: 79 MG/DL
INTERPRETATION, 910389: NORMAL
INTERPRETATION: NORMAL
LDLC SERPL CALC-MCNC: 127 MG/DL (ref 0–99)
Lab: NORMAL
MICROALBUMIN UR-MCNC: 59.8 UG/ML
PDF IMAGE, 910387: NORMAL
POTASSIUM SERPL-SCNC: 4.8 MMOL/L (ref 3.5–5.2)
PROT SERPL-MCNC: 7.2 G/DL (ref 6–8.5)
SODIUM SERPL-SCNC: 146 MMOL/L (ref 134–144)
TRIGL SERPL-MCNC: 53 MG/DL (ref 0–149)
VLDLC SERPL CALC-MCNC: 11 MG/DL (ref 5–40)

## 2019-03-18 ENCOUNTER — OFFICE VISIT (OUTPATIENT)
Dept: ENDOCRINOLOGY | Age: 59
End: 2019-03-18

## 2019-03-18 VITALS
WEIGHT: 186.2 LBS | SYSTOLIC BLOOD PRESSURE: 168 MMHG | DIASTOLIC BLOOD PRESSURE: 89 MMHG | BODY MASS INDEX: 28.22 KG/M2 | HEART RATE: 65 BPM | HEIGHT: 68 IN

## 2019-03-18 DIAGNOSIS — E78.00 HYPERCHOLESTEROLEMIA: ICD-10-CM

## 2019-03-18 DIAGNOSIS — I10 ESSENTIAL HYPERTENSION, BENIGN: ICD-10-CM

## 2019-03-18 DIAGNOSIS — E11.21 TYPE 2 DIABETES WITH NEPHROPATHY (HCC): Primary | ICD-10-CM

## 2019-03-18 RX ORDER — AMLODIPINE BESYLATE AND BENAZEPRIL HYDROCHLORIDE 10; 40 MG/1; MG/1
1 CAPSULE ORAL DAILY
Qty: 90 CAP | Refills: 3 | Status: SHIPPED | OUTPATIENT
Start: 2019-03-18 | End: 2019-11-11 | Stop reason: RX

## 2019-03-18 NOTE — PATIENT INSTRUCTIONS
1) When you go home, take a 2nd dose of amlodipine/benazepril and over the next week take 2 caps everyday. I will send a supply of 10/40 mg caps so you just need to take 1 cap per day when these arrive. 2) Your A1c is 8.3% up slightly from 8.2%. I think if you take a mid-day dose of 10-14 units then this will help you not spike up as much by dinner and likely will get your A1c back down closer to 7% or less. 3) Your LDL (bad cholesterol) went higher due to missing some doses of atorvastatin so try to get this everyday. 4) Start monitoring blood pressure about 2-3 times per week at alternating times either in the morning or evening after resting for 5 minutes and sitting upright in a chair with your arm at heart level. Please let me know if you are having readings over 140 on the top number or 90 on the bottom number after 2 weeks on the higher dose of the amlodipine/benazepril.

## 2019-03-18 NOTE — PROGRESS NOTES
Chief Complaint   Patient presents with    Diabetes     pcp and pharmacy confirmed    Diabetic Foot Exam     due     History of Present Illness: Alisson Marroquin is a 59 y.o. male here for follow up of diabetes. Weight up 15 lbs since last visit in 10/18. Has been compliant with the lotrel and the hctz and has checked home BP readings and most are right around the 140s with some lower and some higher. Has been taking the humalog mix per the scale and also started using the freestyle rosemary 10 day sensors which have helped him have better understanding of his trends in his sugars. He tends to have lower readings fasting closer to 150-200 but then can spike over 200-300 by dinner. Today he decided to take a dose of 14 units of humalog with lunch and by the time he was seen at 4:30pm his reader was showing a sugar of 127 that was trending flat. He is willing to give a dose of insulin at lunch to improve his control. Does admit to missing doses of the atorvastatin to explain the rise in his cholesterol. Current Outpatient Medications   Medication Sig    tadalafil (CIALIS) 20 mg tablet TAKE 1 TABLET BY MOUTH ONE HOUR PRIOR TO SEX ON AN EMPTY STOMACH -Do NOT take if you are currently taking nitrates.  insulin lispro protamine/insulin lispro (HUMALOG MIX 75-25,U-100,INSULN) 100 unit/mL (75-25) injection 30 units in am and 24 units in pm + 2 units for every 50 mg/dl above 150 mg/dl--Dose change 10/15/18--updated med list--did not send prescription to the pharmacy    amLODIPine-benazepril (LOTREL) 5-20 mg per capsule Take 1 Cap by mouth daily. Replaces amlodipine    FREESTYLE ROSEMARY 10 DAY READER [facility] Use as directed    FREESTYLE ROSEMARY 10 DAY SENSOR kit Use as directed    hydroCHLOROthiazide (HYDRODIURIL) 12.5 mg tablet Take 1 Tab by mouth daily.  For blood pressure    ONETOUCH ULTRA BLUE TEST STRIP strip USE AS DIRECTED    atorvastatin (LIPITOR) 80 mg tablet TAKE 1 TABLET BY MOUTH EVERY DAY     No current facility-administered medications for this visit. Allergies   Allergen Reactions    Benazepril Other (comments)     hyperkalemia     Review of Systems:  - Eyes: no blurry vision or double vision  - Cardiovascular: no chest pain  - Respiratory: no shortness of breath  - Musculoskeletal: no myalgias  - Neurological: no numbness/tingling in extremities    Physical Examination:  Blood pressure 168/89, pulse 65, height 5' 8\" (1.727 m), weight 186 lb 3.2 oz (84.5 kg). - General: pleasant, no distress, good eye contact   - Neck: no carotid bruits  - Cardiovascular: regular, normal rate, nl s1 and s2, no m/r/g,   - Respiratory: clear bilaterally  - Integumentary: no edema,   - Psychiatric: normal mood and affect    Diabetic foot exam:     Left Foot:   Visual Exam: callous - mild   Pulse DP: 2+ (normal)   Filament test: reduced sensation    Vibratory sensation: diminished      Right Foot:   Visual Exam: callous - mild   Pulse DP: 2+ (normal)   Filament test: reduced sensation    Vibratory sensation: diminished        Data Reviewed:   Component      Latest Ref Rng & Units 3/13/2019 3/13/2019 3/13/2019 3/13/2019           7:57 AM  7:57 AM  7:57 AM  7:57 AM   Glucose      65 - 99 mg/dL   64 (L)    BUN      8 - 27 mg/dL   25    Creatinine      0.76 - 1.27 mg/dL   1.32 (H)    GFR est non-AA      >59 mL/min/1.73   58 (L)    GFR est AA      >59 mL/min/1.73   67    BUN/Creatinine ratio      10 - 24   19    Sodium      134 - 144 mmol/L   146 (H)    Potassium      3.5 - 5.2 mmol/L   4.8    Chloride      96 - 106 mmol/L   110 (H)    CO2      20 - 29 mmol/L   23    Calcium      8.6 - 10.2 mg/dL   9.8    Protein, total      6.0 - 8.5 g/dL   7.2    Albumin      3.6 - 4.8 g/dL   4.4    GLOBULIN, TOTAL      1.5 - 4.5 g/dL   2.8    A-G Ratio      1.2 - 2.2   1.6    Bilirubin, total      0.0 - 1.2 mg/dL   0.4    Alk.  phosphatase      39 - 117 IU/L   100    AST      0 - 40 IU/L   21    ALT (SGPT)      0 - 44 IU/L   23    Cholesterol, total      100 - 199 mg/dL  217 (H)     Triglyceride      0 - 149 mg/dL  53     HDL Cholesterol      >39 mg/dL  79     VLDL, calculated      5 - 40 mg/dL  11     LDL, calculated      0 - 99 mg/dL  127 (H)     Creatinine, urine      Not Estab. mg/dL 102.0      Microalbumin, urine      Not Estab. ug/mL 59.8      Microalbumin/Creat. Ratio      0.0 - 30.0 mg/g creat 58.6 (H)      Hemoglobin A1c, (calculated)      4.8 - 5.6 %    8.3 (H)   Estimated average glucose      mg/dL    192       Assessment/Plan:     1. Type 1 diabetes, uncontrolled, with retinopathy ([facility] [address]): his most recent Hgb A1c was 8.3% in 3/19 up from 8.2% in 10/18 down from 11.3% in 7/18 up from 8.3% in 11/17 down from 8.5% in 9/17 down from 9% in 6/17 up from 8.3% in 2/17 down from 9.5% in 10/16 stable from 7/16 down from 10% in 3/16. Control is stable and freestyle [place] is helping with trends. Will being a dose of insulin at lunch to help with spikes in the pre-dinner time frame. May benefit from basal/bolus regimen in the future but has been on pre-mix the whole time so will continue this for now. - cont humalog 75/25 mix insulin 30 units before breakfast and 24 units before dinner plus 2 units for every 50 mg/dl above 150 mg/dl and 10-14 units before lunch  - check bs 3 times per day  - foot exam done 3/19  - microalbumin 106 in 3/16, down to 57 in 10/16 and stable in 6/17, up to 109 in 11/17--I added back 20 of benazepril in the form of lotrel 5/20 and down to 58 in 3/19 (will be careful of hyperkalemia as had this in the past) and will increase to 10/40 now  - due for eye exam  - check Hgb A1c, cmp, and microalbumin prior to next visit      2. Essential hypertension, benign: his BP was above goal < 140/90 so will max out lotrel. Does have history of hyperaldosteronism that was surgically treated by left adrenalectomy in 3/12.  - increase lotrel to 10/40 mg daily  - cont hctz 12.5 mg daily  - monitor home blood pressure readings        3. Hypercholesterolemia: Given DM, Goal LDL < 100, non-HDL < 130, and TG < 150.  in 3/16. Up to 171 in 7/16 with non-compliance but down to 93 in 10/16 with compliance and 94 in 3/17 and 110 in 6/17 and 94 in 9/17 and 112 in 11/17. Up to 127 in 3/19 due to missing doses  - cont lipitor 80 mg daily  - check lipids prior to next visit         Patient Instructions   1) When you go home, take a 2nd dose of amlodipine/benazepril and over the next week take 2 caps everyday. I will send a supply of 10/40 mg caps so you just need to take 1 cap per day when these arrive. 2) Your A1c is 8.3% up slightly from 8.2%. I think if you take a mid-day dose of 10-14 units then this will help you not spike up as much by dinner and likely will get your A1c back down closer to 7% or less. 3) Your LDL (bad cholesterol) went higher due to missing some doses of atorvastatin so try to get this everyday. 4) Start monitoring blood pressure about 2-3 times per week at alternating times either in the morning or evening after resting for 5 minutes and sitting upright in a chair with your arm at heart level. Please let me know if you are having readings over 140 on the top number or 90 on the bottom number after 2 weeks on the higher dose of the amlodipine/benazepril. Follow-up Disposition:  Return in about 5 months (around 8/18/2019).     Copy sent to:  Dr. Brandon Briseno via [employer]

## 2019-07-07 RX ORDER — PEN NEEDLE, DIABETIC 29 G X1/2"
NEEDLE, DISPOSABLE MISCELLANEOUS
Qty: 100 SYRINGE | Refills: 3 | Status: SHIPPED | OUTPATIENT
Start: 2019-07-07 | End: 2019-09-10 | Stop reason: SDUPTHER

## 2019-07-08 DIAGNOSIS — E11.21 TYPE 2 DIABETES WITH NEPHROPATHY (HCC): Primary | ICD-10-CM

## 2019-07-08 RX ORDER — FLASH GLUCOSE SCANNING READER
EACH MISCELLANEOUS
Qty: 1 EACH | Refills: 1 | Status: SHIPPED | OUTPATIENT
Start: 2019-07-08 | End: 2020-11-02 | Stop reason: SDUPTHER

## 2019-07-08 RX ORDER — FLASH GLUCOSE SENSOR
KIT MISCELLANEOUS
Qty: 2 KIT | Refills: 11 | Status: SHIPPED | OUTPATIENT
Start: 2019-07-08 | End: 2020-06-20

## 2019-09-07 LAB
ALBUMIN SERPL-MCNC: 4.2 G/DL (ref 3.6–4.8)
ALBUMIN/CREAT UR: 13.7 MG/G CREAT (ref 0–30)
ALBUMIN/GLOB SERPL: 1.8 {RATIO} (ref 1.2–2.2)
ALP SERPL-CCNC: 110 IU/L (ref 39–117)
ALT SERPL-CCNC: 26 IU/L (ref 0–44)
AST SERPL-CCNC: 19 IU/L (ref 0–40)
BILIRUB SERPL-MCNC: 0.4 MG/DL (ref 0–1.2)
BUN SERPL-MCNC: 45 MG/DL (ref 8–27)
BUN/CREAT SERPL: 28 (ref 10–24)
CALCIUM SERPL-MCNC: 9.5 MG/DL (ref 8.6–10.2)
CHLORIDE SERPL-SCNC: 103 MMOL/L (ref 96–106)
CHOLEST SERPL-MCNC: 191 MG/DL (ref 100–199)
CO2 SERPL-SCNC: 22 MMOL/L (ref 20–29)
CREAT SERPL-MCNC: 1.63 MG/DL (ref 0.76–1.27)
CREAT UR-MCNC: 83.2 MG/DL
EST. AVERAGE GLUCOSE BLD GHB EST-MCNC: 209 MG/DL
GLOBULIN SER CALC-MCNC: 2.4 G/DL (ref 1.5–4.5)
GLUCOSE SERPL-MCNC: 338 MG/DL (ref 65–99)
HBA1C MFR BLD: 8.9 % (ref 4.8–5.6)
HDLC SERPL-MCNC: 69 MG/DL
INTERPRETATION, 910389: NORMAL
INTERPRETATION: NORMAL
LDLC SERPL CALC-MCNC: 109 MG/DL (ref 0–99)
Lab: NORMAL
MICROALBUMIN UR-MCNC: 11.4 UG/ML
PDF IMAGE, 910387: NORMAL
POTASSIUM SERPL-SCNC: 5.6 MMOL/L (ref 3.5–5.2)
PROT SERPL-MCNC: 6.6 G/DL (ref 6–8.5)
SODIUM SERPL-SCNC: 139 MMOL/L (ref 134–144)
TRIGL SERPL-MCNC: 63 MG/DL (ref 0–149)
VLDLC SERPL CALC-MCNC: 13 MG/DL (ref 5–40)

## 2019-09-10 ENCOUNTER — OFFICE VISIT (OUTPATIENT)
Dept: ENDOCRINOLOGY | Age: 59
End: 2019-09-10

## 2019-09-10 VITALS
DIASTOLIC BLOOD PRESSURE: 72 MMHG | BODY MASS INDEX: 27.04 KG/M2 | HEIGHT: 68 IN | HEART RATE: 77 BPM | WEIGHT: 178.4 LBS | SYSTOLIC BLOOD PRESSURE: 130 MMHG

## 2019-09-10 DIAGNOSIS — I10 ESSENTIAL HYPERTENSION, BENIGN: ICD-10-CM

## 2019-09-10 DIAGNOSIS — E78.00 HYPERCHOLESTEROLEMIA: ICD-10-CM

## 2019-09-10 DIAGNOSIS — E11.21 TYPE 2 DIABETES WITH NEPHROPATHY (HCC): Primary | ICD-10-CM

## 2019-09-10 RX ORDER — HYDROCHLOROTHIAZIDE 12.5 MG/1
12.5 TABLET ORAL DAILY
Qty: 90 TAB | Refills: 3 | Status: SHIPPED | OUTPATIENT
Start: 2019-09-10 | End: 2021-02-11 | Stop reason: SDUPTHER

## 2019-09-10 RX ORDER — ATORVASTATIN CALCIUM 80 MG/1
TABLET, FILM COATED ORAL
Qty: 90 TAB | Refills: 3 | Status: SHIPPED | OUTPATIENT
Start: 2019-09-10 | End: 2021-02-11 | Stop reason: SDUPTHER

## 2019-09-10 RX ORDER — SYRINGE-NEEDLE,INSULIN,0.5 ML 27GX1/2"
SYRINGE, EMPTY DISPOSABLE MISCELLANEOUS
Qty: 300 SYRINGE | Refills: 3 | Status: SHIPPED | OUTPATIENT
Start: 2019-09-10 | End: 2020-11-30 | Stop reason: SDUPTHER

## 2019-09-10 NOTE — PATIENT INSTRUCTIONS
1) For the next 2 weeks, do your best to drink at least 32 oz of water per day as I want to repeat your kidney function and potassium with getting your sugar back down.     2) Check your sugar before breakfast and dinner and adjust your insulin based on the scale below:     Blood sugar  Breakfast   Dinner      Less than 90  Eat first, take 32 units  Eat first, take 24     34 units   28     151-200  36 units   30   201-250  38 units   32  251-300  40 units   34   301-350  42 units   36   351-400  44 units   38  401-450  46 units   40  451-500  48 units   42  501-550  50 units   44  551-600  52 units   46  Over 600  54 units   48    3) If you check your sugar at lunch, adjust it as below based on if you are eating any food with carbs or not    Blood sugar  Lunch with carbs  No lunch      Less than 90  Eat first, take 12 units  ----     14 units   ----    151-200  16 units   4   201-250  18 units   6  251-300  20 units   8   301-350  22 units   10   351-400  24 units   12  401-450  26 units   14  451-500  28 units   16  501-550  30 units   18  551-600  32 units   20  Over 600  34 units   22

## 2019-09-10 NOTE — PROGRESS NOTES
Chief Complaint   Patient presents with    Diabetes     pcp and pharmacy confirmed     History of Present Illness: Sami Colindres is a 59 y.o. male here for follow up of diabetes. Weight down 8 lbs since last visit in 3/19. Has been using the freestyle kye 14 day sensors for the past month after he used up his supply of the 10 day sensors. Finds that his sugars tend to stay over 200 frequently in the middle of the night and also tend to spike over 200 during the day. Has been trying to take a dose of insulin at lunch if he eats carbs but doesn't always do this. His sugar was 423 in the office today and I gave him 16 oz of water to drink. Compliant with higher dose of lotrel and his BP has been under 140/90 at home. Compliant with lipitor but his prescription ran out after his labs were drawn so I refilled this today. Current Outpatient Medications   Medication Sig    FREESTYLE KYE 14 DAY READER misc Use as directed    FREESTYLE KYE 14 DAY SENSOR kit Use as directed    BD INSULIN SYRINGE ULTRA-FINE 1 mL 30 gauge x 1/2\" syrg USE AS DIRECTED    insulin lispro protamine/insulin lispro (HUMALOG MIX 75-25,U-100,INSULN) 100 unit/mL (75-25) injection 30 units in am and 10-14 at lunch and 24 units in pm + 2 units for every 50 mg/dl above 150 mg/dl-- units/day    amLODIPine-benazepril (LOTREL) 10-40 mg per capsule Take 1 Cap by mouth daily. Replaces 5/20 mg dose    tadalafil (CIALIS) 20 mg tablet TAKE 1 TABLET BY MOUTH ONE HOUR PRIOR TO SEX ON AN EMPTY STOMACH -Do NOT take if you are currently taking nitrates.  FREESTYLE KYE 10 DAY READER misc Use as directed    FREESTYLE KYE 10 DAY SENSOR kit Use as directed    hydroCHLOROthiazide (HYDRODIURIL) 12.5 mg tablet Take 1 Tab by mouth daily.  For blood pressure    atorvastatin (LIPITOR) 80 mg tablet TAKE 1 TABLET BY MOUTH EVERY DAY    ONETOUCH ULTRA BLUE TEST STRIP strip USE AS DIRECTED     No current facility-administered medications for this visit. Allergies   Allergen Reactions    Benazepril Other (comments)     hyperkalemia     Review of Systems:  - Eyes: no blurry vision or double vision  - Cardiovascular: no chest pain  - Respiratory: no shortness of breath  - Musculoskeletal: no myalgias  - Neurological: no numbness/tingling in extremities    Physical Examination:  Blood pressure 130/72, pulse 77, height 5' 8\" (1.727 m), weight 178 lb 6.4 oz (80.9 kg). - General: pleasant, no distress, good eye contact   - Neck: no carotid bruits  - Cardiovascular: regular, normal rate, nl s1 and s2, no m/r/g,   - Respiratory: clear bilaterally  - Integumentary: no edema,   - Psychiatric: normal mood and affect    Data Reviewed:   Component      Latest Ref Rng & Units 9/6/2019 9/6/2019 9/6/2019 9/6/2019           3:48 PM  3:48 PM  3:48 PM  3:48 PM   Glucose      65 - 99 mg/dL  338 (H)     BUN      8 - 27 mg/dL  45 (H)     Creatinine      0.76 - 1.27 mg/dL  1.63 (H)     GFR est non-AA      >59 mL/min/1.73  45 (L)     GFR est AA      >59 mL/min/1.73  52 (L)     BUN/Creatinine ratio      10 - 24  28 (H)     Sodium      134 - 144 mmol/L  139     Potassium      3.5 - 5.2 mmol/L  5.6 (H)     Chloride      96 - 106 mmol/L  103     CO2      20 - 29 mmol/L  22     Calcium      8.6 - 10.2 mg/dL  9.5     Protein, total      6.0 - 8.5 g/dL  6.6     Albumin      3.6 - 4.8 g/dL  4.2     GLOBULIN, TOTAL      1.5 - 4.5 g/dL  2.4     A-G Ratio      1.2 - 2.2  1.8     Bilirubin, total      0.0 - 1.2 mg/dL  0.4     Alk. phosphatase      39 - 117 IU/L  110     AST      0 - 40 IU/L  19     ALT (SGPT)      0 - 44 IU/L  26     Cholesterol, total      100 - 199 mg/dL 191      Triglyceride      0 - 149 mg/dL 63      HDL Cholesterol      >39 mg/dL 69      VLDL, calculated      5 - 40 mg/dL 13      LDL, calculated      0 - 99 mg/dL 109 (H)      Creatinine, urine      Not Estab. mg/dL    83.2   Microalbumin, urine      Not Estab. ug/mL    11.4   Microalbumin/Creat.  Ratio      0.0 - 30.0 mg/g creat    13.7   Hemoglobin A1c, (calculated)      4.8 - 5.6 %   8.9 (H)    Estimated average glucose      mg/dL   209        Assessment/Plan:     1. Type 1 diabetes, uncontrolled, with retinopathy ([address]): his most recent Hgb A1c was 8.9% in 9/19 up from 8.3% in 3/19 up from 8.2% in 10/18 down from 11.3% in 7/18 up from 8.3% in 11/17 down from 8.5% in 9/17 down from 9% in 6/17 up from 8.3% in 2/17 down from 9.5% in 10/16 stable from 7/16 down from 10% in 3/16. Control is worse and he would like to have knee surgery in the future so will need his A1c < 8%. Gave him a more aggressive scale to follow as below. - increase humalog 75/25 mix insulin to 34 units before breakfast and 14 units before lunch and28 units before dinner plus 2 units for every 50 mg/dl above 150 mg/dl   - check bs 3 times per day  - foot exam done 3/19  - microalbumin 106 in 3/16, down to 57 in 10/16 and stable in 6/17, up to 109 in 11/17--I added back 20 of benazepril in the form of lotrel 5/20 and down to 58 in 3/19 (will be careful of hyperkalemia as had this in the past) and increased to 10/40 and back to normal at 13.7 but K up to 5.6  - due for eye exam  - check Hgb A1c and cmp prior to next visit      2. Essential hypertension, benign: his BP was at goal < 140/90. Does have history of hyperaldosteronism that was surgically treated by left adrenalectomy in 3/12. His K was higher but his sugar was over 300 on his lab draw so will repeat labs in 2 weeks. - cont lotrel 10/40 mg daily  - cont hctz 12.5 mg daily  - check bmp in 2 weeks  - monitor home blood pressure readings        3. Hypercholesterolemia: Given DM, Goal LDL < 100, non-HDL < 130, and TG < 150.  in 3/16. Up to 171 in 7/16 with non-compliance but down to 93 in 10/16 with compliance and 94 in 3/17 and 110 in 6/17 and 94 in 9/17 and 112 in 11/17.   Up to 127 in 3/19 due to missing doses and 109 in 9/19 with better compliance  - cont lipitor 80 mg daily  - check lipids prior to next visit         Patient Instructions   1) For the next 2 weeks, do your best to drink at least 32 oz of water per day as I want to repeat your kidney function and potassium with getting your sugar back down.     2) Check your sugar before breakfast and dinner and adjust your insulin based on the scale below:     Blood sugar  Breakfast   Dinner      Less than 90  Eat first, take 32 units  Eat first, take 24     34 units   28     151-200  36 units   30   201-250  38 units   32  251-300  40 units   34   301-350  42 units   36   351-400  44 units   38  401-450  46 units   40  451-500  48 units   42  501-550  50 units   44  551-600  52 units   46  Over 600  54 units   48    3) If you check your sugar at lunch, adjust it as below based on if you are eating any food with carbs or not    Blood sugar  Lunch with carbs  No lunch      Less than 90  Eat first, take 12 units  ----     14 units   ----    151-200  16 units   4   201-250  18 units   6  251-300  20 units   8   301-350  22 units   10   351-400  24 units   12  401-450  26 units   14  451-500  28 units   16  501-550  30 units   18  551-600  32 units   20  Over 600  34 units   22      Follow-up and Dispositions    · Return for 1/7/20 at 9:30am.               Copy sent to:  Dr. Chni Pandey via [facility]    Lab follow up: 9/28/19    Sent him the following message in a letter:    Resulted Orders   METABOLIC PANEL, BASIC (Collected: 9/26/2019  8:01 AM)   Result Value Ref Range    Glucose 263 (H) 65 - 99 mg/dL    BUN 39 (H) 8 - 27 mg/dL    Creatinine 1.64 (H) 0.76 - 1.27 mg/dL    GFR est non-AA 45 (L) >59 mL/min/1.73    GFR est AA 52 (L) >59 mL/min/1.73    BUN/Creatinine ratio 24 10 - 24    Sodium 138 134 - 144 mmol/L    Potassium 5.3 (H) 3.5 - 5.2 mmol/L    Chloride 101 96 - 106 mmol/L    CO2 18 (L) 20 - 29 mmol/L    Calcium 9.2 8.6 - 10.2 mg/dL    Narrative    Performed at:  [address] [id number]  : Florencia Chirinos MD, Phone:  [phone]       BUN and creatinine are markers of kidney function. Your values are still abnormal but stable from last check. Your potassium has come down with a lower blood sugar on your lab draw. I still think you are mildly dehydrated so please drink at least 4 8oz glasses of water everyday to stay hydrated to prevent your creatinine level from going higher.

## 2019-09-27 LAB
BUN SERPL-MCNC: 39 MG/DL (ref 8–27)
BUN/CREAT SERPL: 24 (ref 10–24)
CALCIUM SERPL-MCNC: 9.2 MG/DL (ref 8.6–10.2)
CHLORIDE SERPL-SCNC: 101 MMOL/L (ref 96–106)
CO2 SERPL-SCNC: 18 MMOL/L (ref 20–29)
CREAT SERPL-MCNC: 1.64 MG/DL (ref 0.76–1.27)
GLUCOSE SERPL-MCNC: 263 MG/DL (ref 65–99)
INTERPRETATION: NORMAL
Lab: NORMAL
POTASSIUM SERPL-SCNC: 5.3 MMOL/L (ref 3.5–5.2)
SODIUM SERPL-SCNC: 138 MMOL/L (ref 134–144)

## 2019-11-11 ENCOUNTER — TELEPHONE (OUTPATIENT)
Dept: ENDOCRINOLOGY | Age: 59
End: 2019-11-11

## 2019-11-11 RX ORDER — AMLODIPINE BESYLATE 10 MG/1
10 TABLET ORAL DAILY
Qty: 90 TAB | Refills: 3 | Status: SHIPPED | OUTPATIENT
Start: 2019-11-11 | End: 2020-06-18 | Stop reason: ALTCHOICE

## 2019-11-11 RX ORDER — BENAZEPRIL HYDROCHLORIDE 40 MG/1
40 TABLET ORAL DAILY
Qty: 90 TAB | Refills: 3 | Status: SHIPPED | OUTPATIENT
Start: 2019-11-11 | End: 2021-02-11 | Stop reason: SDUPTHER

## 2019-11-12 NOTE — TELEPHONE ENCOUNTER
Received a fax from [facility] that the amlodipine-benazepril 10/40 mg tabs are not available. They do have amlodipine 10 mg tabs and benazepril 40 mg tabs so have sent a new supply of each med to take 1 tab daily of each to [facility] delivery. Please notify him of this change.

## 2019-11-12 NOTE — TELEPHONE ENCOUNTER
Pt was made aware of the medication change after his name and  was verified. Pt  voiced understanding of the instructions.

## 2019-11-15 ENCOUNTER — TELEPHONE (OUTPATIENT)
Dept: ENDOCRINOLOGY | Age: 59
End: 2019-11-15

## 2019-11-15 NOTE — TELEPHONE ENCOUNTER
Spoke with Beverley Anaya the pharmacist at [facility] and he stated to disregard the recent refill request for the amlodipine/benazepril. The stated that the medication is still on backorder and  the individual bp rx was just sent out.

## 2020-01-04 DIAGNOSIS — E11.21 TYPE 2 DIABETES WITH NEPHROPATHY (HCC): ICD-10-CM

## 2020-01-04 DIAGNOSIS — I10 ESSENTIAL HYPERTENSION, BENIGN: ICD-10-CM

## 2020-01-04 DIAGNOSIS — E78.00 HYPERCHOLESTEROLEMIA: ICD-10-CM

## 2020-03-05 LAB
ALBUMIN SERPL-MCNC: 4.5 G/DL (ref 3.8–4.8)
ALBUMIN/GLOB SERPL: 1.8 {RATIO} (ref 1.2–2.2)
ALP SERPL-CCNC: 93 IU/L (ref 39–117)
ALT SERPL-CCNC: 20 IU/L (ref 0–44)
AST SERPL-CCNC: 19 IU/L (ref 0–40)
BILIRUB SERPL-MCNC: 0.4 MG/DL (ref 0–1.2)
BUN SERPL-MCNC: 22 MG/DL (ref 8–27)
BUN/CREAT SERPL: 18 (ref 10–24)
CALCIUM SERPL-MCNC: 9.7 MG/DL (ref 8.6–10.2)
CHLORIDE SERPL-SCNC: 107 MMOL/L (ref 96–106)
CHOLEST SERPL-MCNC: 191 MG/DL (ref 100–199)
CO2 SERPL-SCNC: 22 MMOL/L (ref 20–29)
CREAT SERPL-MCNC: 1.25 MG/DL (ref 0.76–1.27)
EST. AVERAGE GLUCOSE BLD GHB EST-MCNC: 200 MG/DL
GLOBULIN SER CALC-MCNC: 2.5 G/DL (ref 1.5–4.5)
GLUCOSE SERPL-MCNC: 76 MG/DL (ref 65–99)
HBA1C MFR BLD: 8.6 % (ref 4.8–5.6)
HDLC SERPL-MCNC: 66 MG/DL
INTERPRETATION, 910389: NORMAL
LDLC SERPL CALC-MCNC: 116 MG/DL (ref 0–99)
Lab: NORMAL
POTASSIUM SERPL-SCNC: 4.3 MMOL/L (ref 3.5–5.2)
PROT SERPL-MCNC: 7 G/DL (ref 6–8.5)
SODIUM SERPL-SCNC: 144 MMOL/L (ref 134–144)
TRIGL SERPL-MCNC: 43 MG/DL (ref 0–149)
VLDLC SERPL CALC-MCNC: 9 MG/DL (ref 5–40)

## 2020-03-09 ENCOUNTER — OFFICE VISIT (OUTPATIENT)
Dept: ENDOCRINOLOGY | Age: 60
End: 2020-03-09

## 2020-03-09 VITALS
HEART RATE: 67 BPM | WEIGHT: 181.2 LBS | BODY MASS INDEX: 27.46 KG/M2 | SYSTOLIC BLOOD PRESSURE: 146 MMHG | DIASTOLIC BLOOD PRESSURE: 70 MMHG | HEIGHT: 68 IN

## 2020-03-09 DIAGNOSIS — E11.21 TYPE 2 DIABETES WITH NEPHROPATHY (HCC): Primary | ICD-10-CM

## 2020-03-09 DIAGNOSIS — I10 ESSENTIAL HYPERTENSION, BENIGN: ICD-10-CM

## 2020-03-09 DIAGNOSIS — E78.00 HYPERCHOLESTEROLEMIA: ICD-10-CM

## 2020-03-09 NOTE — PROGRESS NOTES
Chief Complaint   Patient presents with    Diabetes     pcp and pharmacy confirmed     History of Present Illness: Skippchito Dickey is a 62 y.o. male here for follow up of diabetes. Weight up 3 lbs since last visit in 9/19. He has not been following the scale I gave him and tends to take less insulin than directed for fear of hypoglycemia but review of his Kassy Perkins tracing shows that he can have a lot of variability in his sugars sometimes as low as the 50 overnight and other times over 350. Still having a lot of spikes during the day if he doesn't take enough insulin so he will keep a food log and write down exactly what his sugar was and how much insulin he took so I can give better recommendations. Compliant with BP and lipid regimen. Home BP readings are in the [id number] systolic. Current Outpatient Medications   Medication Sig    amLODIPine (NORVASC) 10 mg tablet Take 1 Tab by mouth daily. Replaces amlodipine-benazepril 10/40 mg tabs    benazepril (LOTENSIN) 40 mg tablet Take 1 Tab by mouth daily. Replaces amlodipine-benazepril 10/40 mg tabs    insulin lispro protamine/insulin lispro (HUMALOG MIX 75-25,U-100,INSULN) 100 unit/mL (75-25) injection 34 units in am and 14 at lunch and 28 units in pm + 2 units for every 50 mg/dl above 150 mg/dl-- units/day--Dose change 9/10/19--updated med list--did not send prescription to the pharmacy    hydroCHLOROthiazide (HYDRODIURIL) 12.5 mg tablet Take 1 Tab by mouth daily. For blood pressure    atorvastatin (LIPITOR) 80 mg tablet TAKE 1 TABLET BY MOUTH EVERY DAY    Insulin Syringe-Needle U-100 (BD INSULIN SYRINGE ULTRA-FINE) 1 mL 30 gauge x 1/2\" syrg Inject 3 times daily    FREESTYLE KYE 14 DAY READER misc Use as directed    FREESTYLE KYE 14 DAY SENSOR kit Use as directed    tadalafil (CIALIS) 20 mg tablet TAKE 1 TABLET BY MOUTH ONE HOUR PRIOR TO SEX ON AN EMPTY STOMACH -Do NOT take if you are currently taking nitrates.     ONETOUCH ULTRA BLUE TEST STRIP strip USE AS DIRECTED     No current facility-administered medications for this visit. Allergies   Allergen Reactions    Benazepril Other (comments)     hyperkalemia     Review of Systems:  - Eyes: no blurry vision or double vision  - Cardiovascular: no chest pain  - Respiratory: no shortness of breath  - Musculoskeletal: no myalgias  - Neurological: no numbness/tingling in extremities    Physical Examination:  Blood pressure 146/70, pulse 67, height 5' 8\" (1.727 m), weight 181 lb 3.2 oz (82.2 kg). - General: pleasant, no distress, good eye contact   - Neck: no carotid bruits  - Cardiovascular: regular, normal rate, nl s1 and s2, no m/r/g,   - Respiratory: clear bilaterally  - Integumentary: no edema,   - Psychiatric: normal mood and affect    Diabetic foot exam:     Left Foot:   Visual Exam: callous - mild   Pulse DP: 2+ (normal)   Filament test: reduced sensation    Vibratory sensation: diminished      Right Foot:   Visual Exam: callous - mild   Pulse DP: 2+ (normal)   Filament test: reduced sensation    Vibratory sensation: diminished        Data Reviewed:   Component      Latest Ref Rng & Units 3/4/2020 3/4/2020 3/4/2020          10:39 AM 10:39 AM 10:39 AM   Glucose      65 - 99 mg/dL 76     BUN      8 - 27 mg/dL 22     Creatinine      0.76 - 1.27 mg/dL 1.25     GFR est non-AA      >59 mL/min/1.73 62     GFR est AA      >59 mL/min/1.73 71     BUN/Creatinine ratio      10 - 24 18     Sodium      134 - 144 mmol/L 144     Potassium      3.5 - 5.2 mmol/L 4.3     Chloride      96 - 106 mmol/L 107 (H)     CO2      20 - 29 mmol/L 22     Calcium      8.6 - 10.2 mg/dL 9.7     Protein, total      6.0 - 8.5 g/dL 7.0     Albumin      3.8 - 4.8 g/dL 4.5     GLOBULIN, TOTAL      1.5 - 4.5 g/dL 2.5     A-G Ratio      1.2 - 2.2 1.8     Bilirubin, total      0.0 - 1.2 mg/dL 0.4     Alk.  phosphatase      39 - 117 IU/L 93     AST      0 - 40 IU/L 19     ALT (SGPT)      0 - 44 IU/L 20     Cholesterol, total      100 - 199 mg/dL  191    Triglyceride      0 - 149 mg/dL  43    HDL Cholesterol      >39 mg/dL  66    VLDL, calculated      5 - 40 mg/dL  9    LDL, calculated      0 - 99 mg/dL  116 (H)    Hemoglobin A1c, (calculated)      4.8 - 5.6 %   8.6 (H)   Estimated average glucose      mg/dL   200       Assessment/Plan:     1. Type 1 diabetes, uncontrolled, with retinopathy ([facility] [address]): his most recent Hgb A1c was 8.6% in 3/20 down from 8.9% in 9/19 up from 8.3% in 3/19 up from 8.2% in 10/18 down from 11.3% in 7/18 up from 8.3% in 11/17 down from 8.5% in 9/17 down from 9% in 6/17 up from 8.3% in 2/17 down from 9.5% in 10/16 stable from 7/16 down from 10% in 3/16. Control is worse and he would like to have knee surgery in the future so will need his A1c < 8%. Still having too much variability and need more data to help him make better adjustments to his insulin doses. - cont humalog 75/25 mix insulin 34 units before breakfast and 14 units before lunch and 28 units before dinner plus 2 units for every 50 mg/dl above 150 mg/dl   - check bs 3 times per day  - foot exam done 3/20  - microalbumin 106 in 3/16, down to 57 in 10/16 and stable in 6/17, up to 109 in 11/17--I added back 20 of benazepril in the form of lotrel 5/20 and down to 58 in 3/19 (will be careful of hyperkalemia as had this in the past) and increased to 10/40 and back to normal at 13.7 but K up to 5.6.  K down to 4.3 in 3/20.  - eye exam 11/19  - check Hgb A1c and bmp prior to next visit      2. Essential hypertension, benign: his BP was above goal < 140/90 but home readings are at goal. Does have history of hyperaldosteronism that was surgically treated by left adrenalectomy in 3/12. His K was higher but his sugar was over 300 on his lab draw in 9/19 but back to normal in 3/20  - cont amlodipine 10 mg daily  - cont benazepril 40 mg daily  - cont hctz 12.5 mg daily  - monitor home blood pressure readings        3.  Hypercholesterolemia: Given DM, Goal LDL < 100, non-HDL < 130, and TG < 150.  in 3/16. Up to 171 in 7/16 with non-compliance but down to 93 in 10/16 with compliance and 94 in 3/17 and 110 in 6/17 and 94 in 9/17 and 112 in 11/17. Up to 127 in 3/19 due to missing doses and 109 in 9/19 with better compliance. Up to 116 in 3/20  - cont lipitor 80 mg daily  - check lipids prior to next visit         Patient Instructions   1) For one week write down your sugar before each meal and bedtime and how much insulin you took and what you ate and I can help you make adjustments to your doses as you seem to be having a lot of variability with lows in the 50s up to highs over 350.    2) Your A1c was better at 8.6% down from 8.9% but I would like to get this under 8% but with less variability. 3) Your BUN and creatinine are markers of kidney function. Your values are normal.    4) ALT and AST are markers of liver function. Your values are normal.    5) Your cholesterol is stable. 6) Your blood pressure was up but home readings have been at goal.          Follow-up and Dispositions    · Return in about 6 months (around 9/9/2020).                Copy sent to:  Dr. Roland Angel via [facility]

## 2020-03-09 NOTE — PATIENT INSTRUCTIONS
1) For one week write down your sugar before each meal and bedtime and how much insulin you took and what you ate and I can help you make adjustments to your doses as you seem to be having a lot of variability with lows in the 50s up to highs over 350. 
 
2) Your A1c was better at 8.6% down from 8.9% but I would like to get this under 8% but with less variability. 3) Your BUN and creatinine are markers of kidney function. Your values are normal. 
 
4) ALT and AST are markers of liver function. Your values are normal. 
 
5) Your cholesterol is stable.  
 
6) Your blood pressure was up but home readings have been at goal.

## 2020-03-28 ENCOUNTER — TELEPHONE (OUTPATIENT)
Dept: ENDOCRINOLOGY | Age: 60
End: 2020-03-28

## 2020-03-28 NOTE — TELEPHONE ENCOUNTER
Please call him to let him know that his blood sugars he dropped off 2 weeks ago look much better so I won't make any changes to his insulin regimen. He should continue to keep track of how much insulin he took and what he ate and if he has sugar spikes over 200, then he should eat less of these foods in the future or take 4 more units for these foods and if he has lows under 80, he should plan on taking 4 units less insulin for a similar meal in the future.

## 2020-05-01 ENCOUNTER — TELEPHONE (OUTPATIENT)
Dept: ENDOCRINOLOGY | Age: 60
End: 2020-05-01

## 2020-05-01 NOTE — TELEPHONE ENCOUNTER
----- Message from Smitha Ruano sent at 4/29/2020  9:56 AM EDT -----  Regarding: Dr. Ugo Duarte Message/Vendor Calls    Caller's first and last name:  pt    Reason for call:  Requesting a sooner appt    Callback required yes/no and why:  yes    Best contact number(s):  [phone]    Details to clarify the request:  Requesting a sooner appt then upcoming appt scheduled Monday 09/14/2020. Pt is willing and able to do virtual visit.      Smitha Ruano

## 2020-06-18 ENCOUNTER — VIRTUAL VISIT (OUTPATIENT)
Dept: FAMILY MEDICINE CLINIC | Age: 60
End: 2020-06-18

## 2020-06-18 DIAGNOSIS — Z79.4 CONTROLLED TYPE 2 DIABETES MELLITUS WITH STAGE 3 CHRONIC KIDNEY DISEASE, WITH LONG-TERM CURRENT USE OF INSULIN (HCC): ICD-10-CM

## 2020-06-18 DIAGNOSIS — E11.22 CONTROLLED TYPE 2 DIABETES MELLITUS WITH STAGE 3 CHRONIC KIDNEY DISEASE, WITH LONG-TERM CURRENT USE OF INSULIN (HCC): ICD-10-CM

## 2020-06-18 DIAGNOSIS — H25.012 CORTICAL AGE-RELATED CATARACT OF LEFT EYE: Primary | ICD-10-CM

## 2020-06-18 DIAGNOSIS — N18.30 CONTROLLED TYPE 2 DIABETES MELLITUS WITH STAGE 3 CHRONIC KIDNEY DISEASE, WITH LONG-TERM CURRENT USE OF INSULIN (HCC): ICD-10-CM

## 2020-06-18 RX ORDER — KETOROLAC TROMETHAMINE 5 MG/ML
1 SOLUTION OPHTHALMIC 4 TIMES DAILY
COMMUNITY
Start: 2020-06-08 | End: 2020-06-18 | Stop reason: ALTCHOICE

## 2020-06-18 RX ORDER — PREDNISOLONE ACETATE 10 MG/ML
1 SUSPENSION/ DROPS OPHTHALMIC 4 TIMES DAILY
COMMUNITY
Start: 2020-06-08 | End: 2021-09-20

## 2020-06-18 RX ORDER — OFLOXACIN 3 MG/ML
1 SOLUTION/ DROPS OPHTHALMIC 4 TIMES DAILY
COMMUNITY
Start: 2020-06-08 | End: 2020-06-18 | Stop reason: ALTCHOICE

## 2020-06-18 RX ORDER — AMLODIPINE BESYLATE AND BENAZEPRIL HYDROCHLORIDE 10; 40 MG/1; MG/1
1 CAPSULE ORAL DAILY
COMMUNITY
Start: 2019-03-18 | End: 2020-06-18 | Stop reason: ALTCHOICE

## 2020-06-18 NOTE — PROGRESS NOTES
Chief Complaint   Patient presents with    Pre-op Exam     1. Have you been to the ER, urgent care clinic since your last visit? Hospitalized since your last visit? No    2. Have you seen or consulted any other health care providers outside of the [address] [place] [address] since your last visit? Include any pap smears or colon screening. No     Patient here virtually for pre op exam for cataract surgery with Dr. Kevin GODOY on 7/6/20. Patient to drop off form to pcp.   No form at virtual exam.

## 2020-06-18 NOTE — PROGRESS NOTES
HISTORY OF PRESENT ILLNESS  Latrell Sullivan is a 62 y.o. male. HPI Consent:  Pt. was seen by synchronous (real-time) audio- video technololgy, and/or her healthcare decision maker, is aware that this patient-initiated Telehealth encounter on   is a billable service, with coverage as determined by her insurance carrier. They are aware that they may receive a bill. Pt needs a preop cataract physical. Doing well. Blood sugars have been in the 100s. A1C 8.6 in March. . No hypoglycemic episodes. No complaints of chest pain, shortness of breath, TIAs, claudication or edema. Past Medical History:   Diagnosis Date    ARF (acute renal failure) ([facility] [address] 1/11/2013    Arthritis     knees    CKD stage 3 due to type 2 diabetes mellitus ([facility] [address]     Diabetes ([facility] [address] 1982    Type 1    DKA (diabetic ketoacidoses) ([facility] [address]     Hyperaldosteronism ([facility] [address]     s/p surgery    Hypercholesterolemia 11/30/2010    Hypertension 11/30/2010    Ventricular bigeminy 6/29/2011       Social History     Socioeconomic History    Marital status:      Spouse name: Not on file    Number of children: 1    Years of education: Not on file    Highest education level: Not on file   Tobacco Use    Smoking status: Never Smoker    Smokeless tobacco: Never Used   Substance and Sexual Activity    Alcohol use: Yes     Alcohol/week: 0.0 standard drinks     Comment: may have beer once a week or mixed drink 2 times a month    Drug use: No    Sexual activity: Yes     Partners: Female     Birth control/protection: None   Social History Narrative    L[place] with wife and mother-in-law and 25year old daughter. Works at [employer] for 37 years as a . Likes to do yardwork. Current Outpatient Medications   Medication Sig Dispense Refill    prednisoLONE acetate (PRED FORTE) 1 % ophthalmic suspension Administer 1 Drop to left eye four (4) times daily.  STARTS AFTER EYE SURGERY      benazepril (LOTENSIN) 40 mg tablet Take 1 Tab by mouth daily. Replaces amlodipine-benazepril 10/40 mg tabs 90 Tab 3    insulin lispro protamine/insulin lispro (HUMALOG MIX 75-25,U-100,INSULN) 100 unit/mL (75-25) injection 34 units in am and 14 at lunch and 28 units in pm + 2 units for every 50 mg/dl above 150 mg/dl-- units/day--Dose change 9/10/19--updated med list--did not send prescription to the pharmacy 9 Vial 3    hydroCHLOROthiazide (HYDRODIURIL) 12.5 mg tablet Take 1 Tab by mouth daily. For blood pressure 90 Tab 3    atorvastatin (LIPITOR) 80 mg tablet TAKE 1 TABLET BY MOUTH EVERY DAY 90 Tab 3    Insulin Syringe-Needle U-100 (BD INSULIN SYRINGE ULTRA-FINE) 1 mL 30 gauge x 1/2\" syrg Inject 3 times daily 300 Syringe 3    FREESTYLE KYE 14 DAY READER misc Use as directed 1 Each 1    FREESTYLE KYE 14 DAY SENSOR kit Use as directed 2 Kit 11    ONETOUCH ULTRA BLUE TEST STRIP strip USE AS DIRECTED 300 Strip 2           ROS    Physical Exam    ASSESSMENT and PLAN  No orders of the defined types were placed in this encounter. No orders of the defined types were placed in this encounter. Diagnoses and all orders for this visit:    1. Cortical age-related cataract of left eye    2. Controlled type 2 diabetes mellitus with stage 3 chronic kidney disease, with long-term current use of insulin ([facility] [address]      Pt was evaluated by a video visit encounter for concerns as discussed above. A caregiver was present when appropriate. Due to this being a TeleHealth encounter ([address]) physical exam was limited. Pursuant to the emergency declaration under the [address] [place] [address], this Virtual Visit was conducted, with patients (and/or legal guardians's) consent, to reduce the patient's risk of exposure to COVID -19 and provide necessary medical care. Services were provided through a video synchronous discussion virtually to substitute for in-person clinic visit.  Patient and provider were located at their individual homes. Pt to bring physical form by.

## 2020-06-20 DIAGNOSIS — E11.21 TYPE 2 DIABETES WITH NEPHROPATHY (HCC): ICD-10-CM

## 2020-06-20 RX ORDER — FLASH GLUCOSE SENSOR
KIT MISCELLANEOUS
Qty: 2 KIT | Refills: 11 | Status: SHIPPED | OUTPATIENT
Start: 2020-06-20 | End: 2021-05-09 | Stop reason: SDUPTHER

## 2020-06-23 ENCOUNTER — TELEPHONE (OUTPATIENT)
Dept: FAMILY MEDICINE CLINIC | Age: 60
End: 2020-06-23

## 2020-06-23 NOTE — TELEPHONE ENCOUNTER
----- Message from Ria Shay sent at 6/23/2020  2:22 PM EDT -----  Regarding: DR Parris Cunha / Lisa Danielson Message/Vendor Calls    Tiffani Barbara from [facility] Dr. David Cano office is requesting vital signs and height and weight on patient.      F:  [phone]    Callback required     Best contact number(s):  [phone]          Ria Jaspal

## 2020-09-10 LAB
ALBUMIN SERPL-MCNC: 4.3 G/DL (ref 3.8–4.8)
ALBUMIN/CREAT UR: 25 MG/G CREAT (ref 0–29)
ALBUMIN/GLOB SERPL: 1.9 {RATIO} (ref 1.2–2.2)
ALP SERPL-CCNC: 100 IU/L (ref 39–117)
ALT SERPL-CCNC: 22 IU/L (ref 0–44)
AST SERPL-CCNC: 19 IU/L (ref 0–40)
BILIRUB SERPL-MCNC: 0.3 MG/DL (ref 0–1.2)
BUN SERPL-MCNC: 34 MG/DL (ref 8–27)
BUN/CREAT SERPL: 24 (ref 10–24)
CALCIUM SERPL-MCNC: 9.2 MG/DL (ref 8.6–10.2)
CHLORIDE SERPL-SCNC: 107 MMOL/L (ref 96–106)
CHOLEST SERPL-MCNC: 179 MG/DL (ref 100–199)
CO2 SERPL-SCNC: 22 MMOL/L (ref 20–29)
CREAT SERPL-MCNC: 1.44 MG/DL (ref 0.76–1.27)
CREAT UR-MCNC: 129.4 MG/DL
EST. AVERAGE GLUCOSE BLD GHB EST-MCNC: 177 MG/DL
GLOBULIN SER CALC-MCNC: 2.3 G/DL (ref 1.5–4.5)
GLUCOSE SERPL-MCNC: 159 MG/DL (ref 65–99)
HBA1C MFR BLD: 7.8 % (ref 4.8–5.6)
HDLC SERPL-MCNC: 64 MG/DL
INTERPRETATION, 910389: NORMAL
INTERPRETATION: NORMAL
LDLC SERPL CALC-MCNC: 106 MG/DL (ref 0–99)
Lab: NORMAL
MICROALBUMIN UR-MCNC: 32.8 UG/ML
PDF IMAGE, 910387: NORMAL
POTASSIUM SERPL-SCNC: 5 MMOL/L (ref 3.5–5.2)
PROT SERPL-MCNC: 6.6 G/DL (ref 6–8.5)
SODIUM SERPL-SCNC: 143 MMOL/L (ref 134–144)
TRIGL SERPL-MCNC: 47 MG/DL (ref 0–149)
VLDLC SERPL CALC-MCNC: 9 MG/DL (ref 5–40)

## 2020-09-14 ENCOUNTER — VIRTUAL VISIT (OUTPATIENT)
Dept: ENDOCRINOLOGY | Age: 60
End: 2020-09-14
Payer: COMMERCIAL

## 2020-09-14 DIAGNOSIS — E78.00 HYPERCHOLESTEROLEMIA: ICD-10-CM

## 2020-09-14 DIAGNOSIS — I10 ESSENTIAL HYPERTENSION, BENIGN: ICD-10-CM

## 2020-09-14 DIAGNOSIS — E11.21 TYPE 2 DIABETES WITH NEPHROPATHY (HCC): Primary | ICD-10-CM

## 2020-09-14 PROCEDURE — 99214 OFFICE O/P EST MOD 30 MIN: CPT | Performed by: INTERNAL MEDICINE

## 2020-09-14 RX ORDER — KETOROLAC TROMETHAMINE 5 MG/ML
SOLUTION OPHTHALMIC
COMMUNITY
Start: 2020-08-18 | End: 2021-03-15

## 2020-09-14 RX ORDER — OFLOXACIN 3 MG/ML
SOLUTION/ DROPS OPHTHALMIC
COMMUNITY
Start: 2020-07-20 | End: 2021-03-15

## 2020-09-14 RX ORDER — AMLODIPINE BESYLATE 10 MG/1
TABLET ORAL
COMMUNITY
Start: 2020-08-11 | End: 2021-02-11 | Stop reason: SDUPTHER

## 2020-09-14 NOTE — PROGRESS NOTES
Chief Complaint   Patient presents with    Diabetes     Doxy: [phone]    Other     Pharmacy: [facility]       **THIS IS A VIRTUAL VISIT VIA A VIDEO SYNCHRONOUS DISCUSSION. PATIENT AGREED TO HAVE THEIR CARE DELIVERED OVER A DOXY. ME VIDEO VISIT IN PLACE OF THEIR REGULARLY SCHEDULED OFFICE VISIT**    History of Present Illness: Sarahi Escobar is a 62 y.o. male here for follow up of diabetes. Had cataract surgery in July that went well. Has been following the scale I gave him and the majority of his sugars are staying between 100-200 but does have some down to the 60s and some up to the 300s. His BP is staying in the 130s/60s. Compliant with his meds below. Previously met with Dr. Mahsa Jerome for knee surgery and now that his A1c is under 8%, I told him he can meet with his partner, Dr. Patricia Huerta, to discuss surgery in the future. Current Outpatient Medications   Medication Sig    amLODIPine (NORVASC) 10 mg tablet     ketorolac (ACULAR) 0.5 % ophthalmic solution APPLY 1 DROP IN LEFT EYE FOUR TIMES A DAY STARTING 3 DAYS BEFORE SURGERY    ofloxacin (FLOXIN) 0.3 % ophthalmic solution APPLY 1 DROP IN LEFT EYE FOUR TIMES A DAY STARTING 3 DAYS BEFORE SURGERY    insulin lispro protamine/insulin lispro (HumaLOG Mix 75-25,U-100,Insuln) 100 unit/mL (75-25) injection 34 units in am and 14 at lunch and 28 units in pm + 2 units for every 50 mg/dl above 150 mg/dl-- units/day    FreeStyle Candy 14 Day Sensor kit USE AS DIRECTED    prednisoLONE acetate (PRED FORTE) 1 % ophthalmic suspension Administer 1 Drop to left eye four (4) times daily. STARTS AFTER EYE SURGERY    benazepril (LOTENSIN) 40 mg tablet Take 1 Tab by mouth daily. Replaces amlodipine-benazepril 10/40 mg tabs    hydroCHLOROthiazide (HYDRODIURIL) 12.5 mg tablet Take 1 Tab by mouth daily.  For blood pressure    atorvastatin (LIPITOR) 80 mg tablet TAKE 1 TABLET BY MOUTH EVERY DAY    Insulin Syringe-Needle U-100 (BD INSULIN SYRINGE ULTRA-FINE) 1 mL 30 gauge x 1/2\" syrg Inject 3 times daily    FREESTYLE KYE 14 DAY READER misc Use as directed    ONETOUCH ULTRA BLUE TEST STRIP strip USE AS DIRECTED     No current facility-administered medications for this visit. Allergies   Allergen Reactions    Benazepril Other (comments)     hyperkalemia     Review of Systems: PER HPI    Physical Examination:  - GENERAL: NCAT, Appears well nourished   - EYES: EOMI, non-icteric, no proptosis   - Ear/Nose/Throat: NCAT, no visible inflammation or masses   - CARDIOVASCULAR: no cyanosis, no visible JVD   - RESPIRATORY: respiratory effort normal without any distress or labored breathing   - MUSCULOSKELETAL: Normal ROM of neck and upper extremities observed   - SKIN: No rash on face  - NEUROLOGIC:  No facial asymmetry (Cranial nerve 7 motor function), No gaze palsy   - PSYCHIATRIC: Normal affect, Normal insight and judgement       Data Reviewed:   Component      Latest Ref Rng & Units 9/9/2020 9/9/2020 9/9/2020 9/9/2020           8:08 AM  8:08 AM  8:08 AM  8:08 AM   Glucose      65 - 99 mg/dL  159 (H)     BUN      8 - 27 mg/dL  34 (H)     Creatinine      0.76 - 1.27 mg/dL  1.44 (H)     GFR est non-AA      >59 mL/min/1.73  52 (L)     GFR est AA      >59 mL/min/1.73  60     BUN/Creatinine ratio      10 - 24  24     Sodium      134 - 144 mmol/L  143     Potassium      3.5 - 5.2 mmol/L  5.0     Chloride      96 - 106 mmol/L  107 (H)     CO2      20 - 29 mmol/L  22     Calcium      8.6 - 10.2 mg/dL  9.2     Protein, total      6.0 - 8.5 g/dL  6.6     Albumin      3.8 - 4.8 g/dL  4.3     GLOBULIN, TOTAL      1.5 - 4.5 g/dL  2.3     A-G Ratio      1.2 - 2.2  1.9     Bilirubin, total      0.0 - 1.2 mg/dL  0.3     Alk.  phosphatase      39 - 117 IU/L  100     AST      0 - 40 IU/L  19     ALT      0 - 44 IU/L  22     Cholesterol, total      100 - 199 mg/dL 179      Triglyceride      0 - 149 mg/dL 47      HDL Cholesterol      >39 mg/dL 64      VLDL Cholesterol Jace      5 - 40 mg/dL 9 LDL Cholesterol      0 - 99 mg/dL 106 (H)      Creatinine, urine      Not Estab. mg/dL   129.4    Microalbumin, urine      Not Estab. ug/mL   32.8    Microalbumin/Creat. Ratio      0 - 29 mg/g creat   25    Hemoglobin A1c, (calculated)      4.8 - 5.6 %    7.8 (H)   Estimated average glucose      mg/dL    177       Assessment/Plan:     1. Type 1 diabetes, uncontrolled, with retinopathy ([facility] [address]): his most recent Hgb A1c was 7.8% in 9/20 down from 8.6% in 3/20 down from 8.9% in 9/19 up from 8.3% in 3/19 up from 8.2% in 10/18 down from 11.3% in 7/18 up from 8.3% in 11/17 down from 8.5% in 9/17 down from 9% in 6/17 up from 8.3% in 2/17 down from 9.5% in 10/16 stable from 7/16 down from 10% in 3/16. A1c is the best it's been in 4 years and now is under 8% so he is stable to have knee surgery from my perspective. - cont humalog 75/25 mix insulin 34 units before breakfast and 14 units before lunch and 28 units before dinner plus 2 units for every 50 mg/dl above 150 mg/dl   - check bs 3 times per day  - foot exam done 3/20  - microalbumin 106 in 3/16, down to 57 in 10/16 and stable in 6/17, up to 109 in 11/17--I added back 20 of benazepril in the form of lotrel 5/20 and down to 58 in 3/19 (will be careful of hyperkalemia as had this in the past) and increased to 10/40 and back to normal at 13.7 but K up to 5.6.  K down to 4.3 in 3/20.  - eye exam 11/19  - check Hgb A1c and bmp prior to next visit      2. Essential hypertension, benign: his BP was above goal < 140/90 but home readings are at goal. Does have history of hyperaldosteronism that was surgically treated by left adrenalectomy in 3/12. His K was higher but his sugar was over 300 on his lab draw in 9/19 but back to normal in 3/20  - cont amlodipine 10 mg daily  - cont benazepril 40 mg daily  - cont hctz 12.5 mg daily  - monitor home blood pressure readings        3. Hypercholesterolemia: Given DM, Goal LDL < 100, non-HDL < 130, and TG < 150.  in 3/16.   Up to 171 in 7/16 with non-compliance but down to 93 in 10/16 with compliance and 94 in 3/17 and 110 in 6/17 and 94 in 9/17 and 112 in 11/17. Up to 127 in 3/19 due to missing doses and 109 in 9/19 with better compliance. Up to 116 in 3/20 and down   - cont lipitor 80 mg daily  - check lipids prior to next visit         Patient Instructions   1) Your Hemoglobin A1c (3 month test of blood sugar) is now 7.8% which is at goal of 8% or less. Please call Dr. Hi Lofton for knee surgery. [phone]. 2) BUN and creatinine are markers of kidney function. Your values are abnormal but still improved from 45 and 1.63 in Sept 2019.    3) Your LDL (bad cholesterol) is just above goal of 100 or less. 4) ALT and AST are markers of liver function. Your values are normal.    5) Please come for a follow up visit on 3/15/21 at 8:30am in our [place] office. 6) Take the enclosed lab slip to repeat your labs prior to next visit.               Follow-up and Dispositions    · Return 3/15/21 at 8:30am.               Copy sent to:  Dr. Hieu Sanchez via [facility]  Dr. Hi Lofton

## 2020-09-14 NOTE — LETTER
9/14/2020 Mr. Jennifer Payne [address] [facility] 
[address] [id number] Dear Jennifer Payne: Please find your most recent results below. Resulted Orders HEMOGLOBIN A1C WITH EAG (Collected: 9/9/2020  8:08 AM) Result Value Ref Range Hemoglobin A1c 7.8 (H) 4.8 - 5.6 % Comment:  
            Prediabetes: 5.7 - 6.4 Diabetes: >6.4 Glycemic control for adults with diabetes: <7.0 Estimated average glucose 177 mg/dL Narrative Performed at:  [address]  [id number] : Mirna Mireles MD, Phone:  [phone] MICROALBUMIN, UR, RAND W/ MICROALB/CREAT RATIO (Collected: 9/9/2020  8:08 AM) Result Value Ref Range Creatinine, urine 129.4 Not Estab. mg/dL Microalbumin, urine 32.8 Not Estab. ug/mL Microalb/Creat ratio (ug/mg creat.) 25 0 - 29 mg/g creat Comment:  
                          Normal:                0 -  29 Moderately increased: 30 - 300 Severely increased:       >300 **Please note reference interval change** Narrative Performed at:  [address]  [id number] : Mirna Mireles MD, Phone:  [phone] METABOLIC PANEL, COMPREHENSIVE (Collected: 9/9/2020  8:08 AM) Result Value Ref Range Glucose 159 (H) 65 - 99 mg/dL BUN 34 (H) 8 - 27 mg/dL Creatinine 1.44 (H) 0.76 - 1.27 mg/dL GFR est non-AA 52 (L) >59 mL/min/1.73 GFR est AA 60 >59 mL/min/1.73  
 BUN/Creatinine ratio 24 10 - 24 Sodium 143 134 - 144 mmol/L Potassium 5.0 3.5 - 5.2 mmol/L Chloride 107 (H) 96 - 106 mmol/L  
 CO2 22 20 - 29 mmol/L Calcium 9.2 8.6 - 10.2 mg/dL Protein, total 6.6 6.0 - 8.5 g/dL Albumin 4.3 3.8 - 4.8 g/dL GLOBULIN, TOTAL 2.3 1.5 - 4.5 g/dL A-G Ratio 1.9 1.2 - 2.2 Bilirubin, total 0.3 0.0 - 1.2 mg/dL Alk.  phosphatase 100 39 - 117 IU/L  
 AST (SGOT) 19 0 - 40 IU/L  
 ALT (SGPT) 22 0 - 44 IU/L Narrative Performed at:  [address]  [id number] : Paulo Currie MD, Phone:  [phone] LIPID PANEL (Collected: 9/9/2020  8:08 AM) Result Value Ref Range Cholesterol, total 179 100 - 199 mg/dL Triglyceride 47 0 - 149 mg/dL HDL Cholesterol 64 >39 mg/dL VLDL Cholesterol Jace 9 5 - 40 mg/dL LDL Chol Calc ([facility]) 106 (H) 0 - 99 mg/dL Narrative Performed at:  [address] [address], [place]  [id number] : Paulo Currie MD, Phone:  [phone] CVD REPORT (Collected: 9/9/2020  8:08 AM) Result Value Ref Range INTERPRETATION Note Comment:  
   Supplemental report is available. PDF IMAGE Not applicable Narrative Performed at:  [address]  [id number] : West Andrade MD, Phone:  [phone] CKD REPORT (Collected: 9/9/2020  8:08 AM) Result Value Ref Range Interpretation Note Comment:  
   Supplemental report is available. Narrative Performed at:  [facility] [address]  [id number] : West Andrade MD, Phone:  [phone] DIABETES PATIENT EDUCATION (Collected: 9/9/2020  8:08 AM) Result Value Ref Range PDF Image Not applicable Narrative Performed at:  [facility] [address]  [id number] : [place] Andrade MD, Phone:  [phone]  
 
 
1) Your Hemoglobin A1c (3 month test of blood sugar) is now 7.8% which is at goal of 8% or less. Please call Dr. Rikki Montgomery for knee surgery. [phone]. 2) BUN and creatinine are markers of kidney function. Your values are abnormal but still improved from 45 and 1.63 in Sept 2019. 
 
3) Your LDL (bad cholesterol) is just above goal of 100 or less. 4) ALT and AST are markers of liver function. Your values are normal. 
 
5) Please come for a follow up visit on 3/15/21 at 8:30am in our [place] office. 6) Take the enclosed lab slip to repeat your labs prior to next visit. Please call me if you have any questions: [phone] Sincerely, 
 
 
Gwen Deshpande MD

## 2020-09-14 NOTE — PROGRESS NOTES
Lab Results   Component Value Date/Time    Hemoglobin A1c 7.8 (H) 09/09/2020 08:08 AM    Hemoglobin A1c 8.6 (H) 03/04/2020 10:39 AM    Hemoglobin A1c 8.9 (H) 09/06/2019 03:48 PM     Lab Results   Component Value Date/Time    Cholesterol, total 179 09/09/2020 08:08 AM    HDL Cholesterol 64 09/09/2020 08:08 AM    LDL, calculated 116 (H) 03/04/2020 10:39 AM    LDL Chol Calc (NI[facility]) 106 (H) 09/09/2020 08:08 AM    VLDL, calculated 9 03/04/2020 10:39 AM    VLDL Cholesterol Jace 9 09/09/2020 08:08 AM    Triglyceride 47 09/09/2020 08:08 AM    CHOL/HDL Ratio 3.5 01/12/2013 04:15 AM     Lab Results   Component Value Date/Time    Sodium 143 09/09/2020 08:08 AM    Potassium 5.0 09/09/2020 08:08 AM    Chloride 107 (H) 09/09/2020 08:08 AM    CO2 22 09/09/2020 08:08 AM    Anion gap 7 07/07/2018 11:00 AM    Glucose 159 (H) 09/09/2020 08:08 AM    BUN 34 (H) 09/09/2020 08:08 AM    Creatinine 1.44 (H) 09/09/2020 08:08 AM    BUN/Creatinine ratio 24 09/09/2020 08:08 AM    GFR est AA 60 09/09/2020 08:08 AM    GFR est non-AA 52 (L) 09/09/2020 08:08 AM    Calcium 9.2 09/09/2020 08:08 AM    Bilirubin, total 0.3 09/09/2020 08:08 AM    Alk.  phosphatase 100 09/09/2020 08:08 AM    Protein, total 6.6 09/09/2020 08:08 AM    Albumin 4.3 09/09/2020 08:08 AM    Globulin 3.7 07/07/2018 11:00 AM    A-G Ratio 1.9 09/09/2020 08:08 AM    ALT (SGPT) 22 09/09/2020 08:08 AM    AST (SGOT) 19 09/09/2020 08:08 AM     Lab Results   Component Value Date/Time    Microalb/Creat ratio (ug/mg creat.) 25 09/09/2020 08:08 AM

## 2020-09-14 NOTE — PATIENT INSTRUCTIONS
1) Your Hemoglobin A1c (3 month test of blood sugar) is now 7.8% which is at goal of 8% or less. Please call Dr. Hi Lofton for knee surgery. [phone]. 2) BUN and creatinine are markers of kidney function. Your values are abnormal but still improved from 45 and 1.63 in Sept 2019. 
 
3) Your LDL (bad cholesterol) is just above goal of 100 or less. 4) ALT and AST are markers of liver function. Your values are normal. 
 
5) Please come for a follow up visit on 3/15/21 at 8:30am in our [place] office. 6) Take the enclosed lab slip to repeat your labs prior to next visit.

## 2020-11-02 ENCOUNTER — TELEPHONE (OUTPATIENT)
Dept: ENDOCRINOLOGY | Age: 60
End: 2020-11-02

## 2020-11-02 DIAGNOSIS — E11.21 TYPE 2 DIABETES WITH NEPHROPATHY (HCC): ICD-10-CM

## 2020-11-02 RX ORDER — FLASH GLUCOSE SCANNING READER
EACH MISCELLANEOUS
Qty: 1 EACH | Refills: 1 | Status: SHIPPED | OUTPATIENT
Start: 2020-11-02

## 2020-11-02 NOTE — TELEPHONE ENCOUNTER
----- Message from Isaiah Morning sent at 11/2/2020  1:05 PM EST -----  Regarding: Dr Raul Kuo  Pt lost the [facility] meter 14 day meter, and would like another one call into [facility], number on file, please call pt at [phone]

## 2020-11-02 NOTE — TELEPHONE ENCOUNTER
----- Message from Barbie Horne sent at 10/31/2020  1:17 PM EDT -----  Regarding: MD Lovell/ telephone  Patient's first and last name: Rupali Silver  : 1960    Caller's first and last name: pt    Reason for call:  [address] [place] [address] required yes/no and why: yes     Best contact number(s): [phone]    Details to clarify the request: Pt would like to request prescription for [place] 14 day meter to pharmacy [facility] on file.  Pt would like a call back to confirm status of request.

## 2020-11-30 RX ORDER — SYRINGE-NEEDLE,INSULIN,0.5 ML 27GX1/2"
SYRINGE, EMPTY DISPOSABLE MISCELLANEOUS
Qty: 300 SYRINGE | Refills: 3 | Status: SHIPPED | OUTPATIENT
Start: 2020-11-30 | End: 2020-12-02 | Stop reason: SDUPTHER

## 2020-12-02 RX ORDER — SYRINGE-NEEDLE,INSULIN,0.5 ML 27GX1/2"
SYRINGE, EMPTY DISPOSABLE MISCELLANEOUS
Qty: 300 SYRINGE | Refills: 3 | Status: SHIPPED | OUTPATIENT
Start: 2020-12-02 | End: 2021-11-09

## 2021-02-11 RX ORDER — BENAZEPRIL HYDROCHLORIDE 40 MG/1
40 TABLET ORAL DAILY
Qty: 90 TAB | Refills: 3 | Status: SHIPPED | OUTPATIENT
Start: 2021-02-11 | End: 2022-02-18 | Stop reason: SDUPTHER

## 2021-02-11 RX ORDER — AMLODIPINE BESYLATE 10 MG/1
10 TABLET ORAL DAILY
Qty: 90 TAB | Refills: 3 | Status: SHIPPED | OUTPATIENT
Start: 2021-02-11 | End: 2022-02-18 | Stop reason: SDUPTHER

## 2021-02-11 RX ORDER — ATORVASTATIN CALCIUM 80 MG/1
TABLET, FILM COATED ORAL
Qty: 90 TAB | Refills: 3 | Status: SHIPPED | OUTPATIENT
Start: 2021-02-11 | End: 2021-09-20 | Stop reason: ALTCHOICE

## 2021-02-11 RX ORDER — HYDROCHLOROTHIAZIDE 12.5 MG/1
12.5 TABLET ORAL DAILY
Qty: 90 TAB | Refills: 3 | Status: SHIPPED | OUTPATIENT
Start: 2021-02-11 | End: 2022-02-18 | Stop reason: SDUPTHER

## 2021-03-01 DIAGNOSIS — E11.21 TYPE 2 DIABETES WITH NEPHROPATHY (HCC): ICD-10-CM

## 2021-03-01 DIAGNOSIS — E78.00 HYPERCHOLESTEROLEMIA: ICD-10-CM

## 2021-03-01 DIAGNOSIS — I10 ESSENTIAL HYPERTENSION, BENIGN: ICD-10-CM

## 2021-03-11 LAB
BUN SERPL-MCNC: 24 MG/DL (ref 8–27)
BUN/CREAT SERPL: 16 (ref 10–24)
CALCIUM SERPL-MCNC: 9.5 MG/DL (ref 8.6–10.2)
CHLORIDE SERPL-SCNC: 102 MMOL/L (ref 96–106)
CHOLEST SERPL-MCNC: 176 MG/DL (ref 100–199)
CO2 SERPL-SCNC: 23 MMOL/L (ref 20–29)
CREAT SERPL-MCNC: 1.48 MG/DL (ref 0.76–1.27)
EST. AVERAGE GLUCOSE BLD GHB EST-MCNC: 189 MG/DL
GLUCOSE SERPL-MCNC: 172 MG/DL (ref 65–99)
HBA1C MFR BLD: 8.2 % (ref 4.8–5.6)
HDLC SERPL-MCNC: 60 MG/DL
IMP & REVIEW OF LAB RESULTS: NORMAL
INTERPRETATION: NORMAL
LDLC SERPL CALC-MCNC: 103 MG/DL (ref 0–99)
Lab: NORMAL
PDF IMAGE, 910387: NORMAL
POTASSIUM SERPL-SCNC: 3.9 MMOL/L (ref 3.5–5.2)
SODIUM SERPL-SCNC: 139 MMOL/L (ref 134–144)
TRIGL SERPL-MCNC: 71 MG/DL (ref 0–149)
VLDLC SERPL CALC-MCNC: 13 MG/DL (ref 5–40)

## 2021-03-15 ENCOUNTER — VIRTUAL VISIT (OUTPATIENT)
Dept: ENDOCRINOLOGY | Age: 61
End: 2021-03-15
Payer: COMMERCIAL

## 2021-03-15 DIAGNOSIS — E11.21 TYPE 2 DIABETES WITH NEPHROPATHY (HCC): Primary | ICD-10-CM

## 2021-03-15 DIAGNOSIS — E78.00 HYPERCHOLESTEROLEMIA: ICD-10-CM

## 2021-03-15 DIAGNOSIS — I10 ESSENTIAL HYPERTENSION, BENIGN: ICD-10-CM

## 2021-03-15 PROCEDURE — 99214 OFFICE O/P EST MOD 30 MIN: CPT | Performed by: INTERNAL MEDICINE

## 2021-03-15 NOTE — PROGRESS NOTES
Chief Complaint   Patient presents with   [facility] Diabetes     [phone] michael-Ying    Other     pcp and pharmacy confirmed       **THIS IS A VIRTUAL VISIT VIA A VIDEO SYNCHRONOUS DISCUSSION. PATIENT AGREED TO HAVE THEIR CARE DELIVERED OVER A DOXY. ME VIDEO VISIT IN PLACE OF THEIR REGULARLY SCHEDULED OFFICE VISIT**    History of Present Illness: Neelam Shah is a 56 y.o. male here for follow up of diabetes. His 90 day avg is about 203 and fasting sugars can be down to 85 at the lowest and 275 at the highest based on what he ate the night before like snacking on cookies. Most are under 150 in the morning. Just got his COVID vaccine with Pfizer shot on 3/12/21. He has not yet decided about whether to get a knee replacement and I explained his A1c is now over 8% so we would need to get this down if he does want to have this. Compliant with BP and lipid regimen. Current Outpatient Medications   Medication Sig    benazepriL (LOTENSIN) 40 mg tablet Take 1 Tab by mouth daily. Replaces amlodipine-benazepril 10/40 mg tabs    atorvastatin (LIPITOR) 80 mg tablet TAKE 1 TABLET BY MOUTH EVERY DAY    amLODIPine (NORVASC) 10 mg tablet Take 1 Tab by mouth daily.  hydroCHLOROthiazide (HYDRODIURIL) 12.5 mg tablet Take 1 Tab by mouth daily. For blood pressure    Insulin Syringe-Needle U-100 (BD Insulin Syringe Ultra-Fine) 1 mL 30 gauge x 1/2\" syrg Inject 3 times daily    FreeStyle Candy 14 Day [place] misc Use as directed    insulin lispro protamine/insulin lispro (HumaLOG Mix 75-25,U-100,Insuln) 100 unit/mL (75-25) injection 34 units in am and 14 at lunch and 28 units in pm + 2 units for every 50 mg/dl above 150 mg/dl-- units/day    FreeStyle Candy 14 Day Sensor kit USE AS DIRECTED    prednisoLONE acetate (PRED FORTE) 1 % ophthalmic suspension Administer 1 Drop to left eye four (4) times daily.  STARTS AFTER EYE SURGERY    ONETOUCH ULTRA BLUE TEST STRIP strip USE AS DIRECTED     No current facility-administered medications for this visit. Allergies   Allergen Reactions    Benazepril Other (comments)     hyperkalemia     Review of Systems: PER HPI    Physical Examination:  - GENERAL: NCAT, Appears well nourished   - EYES: EOMI, non-icteric, no proptosis   - Ear/Nose/Throat: NCAT, no visible inflammation or masses   - CARDIOVASCULAR: no cyanosis, no visible JVD   - RESPIRATORY: respiratory effort normal without any distress or labored breathing   - MUSCULOSKELETAL: Normal ROM of neck and upper extremities observed   - SKIN: No rash on face  - NEUROLOGIC:  No facial asymmetry (Cranial nerve 7 motor function), No gaze palsy   - PSYCHIATRIC: Normal affect, Normal insight and judgement       Data Reviewed:   Component      Latest Ref Rng & Units 3/9/2021 3/9/2021 3/9/2021           3:40 PM  3:40 PM  3:40 PM   Glucose      65 - 99 mg/dL 172 (H)     BUN      8 - 27 mg/dL 24     Creatinine      0.76 - 1.27 mg/dL 1.48 (H)     GFR est non-AA      >59 mL/min/1.73 50 (L)     GFR est AA      >59 mL/min/1.73 58 (L)     BUN/Creatinine ratio      10 - 24 16     Sodium      134 - 144 mmol/L 139     Potassium      3.5 - 5.2 mmol/L 3.9     Chloride      96 - 106 mmol/L 102     CO2      20 - 29 mmol/L 23     Calcium      8.6 - 10.2 mg/dL 9.5     Cholesterol, total      100 - 199 mg/dL  176    Triglyceride      0 - 149 mg/dL  71    HDL Cholesterol      >39 mg/dL  60    VLDL, calculated      5 - 40 mg/dL  13    LDL, calculated      0 - 99 mg/dL  103 (H)    Hemoglobin A1c, (calculated)      4.8 - 5.6 %   8.2 (H)   Estimated average glucose      mg/dL   189       Assessment/Plan:     1.  Type 1 diabetes, uncontrolled, with retinopathy ([facility] [address]): his most recent Hgb A1c was 8.2% in 3/21 up from 7.8% in 9/20 down from 8.6% in 3/20 down from 8.9% in 9/19 up from 8.3% in 3/19 up from 8.2% in 10/18 down from 11.3% in 7/18 up from 8.3% in 11/17 down from 8.5% in 9/17 down from 9% in 6/17 up from 8.3% in 2/17 down from 9.5% in 10/16 stable from 7/16 down from 10% in 3/16. A1c is just over 8% so he will need to get this back down if he does want to have knee surgery in the future. - cont humalog 75/25 mix insulin 34 units before breakfast and 14 units before lunch and 28 units before dinner plus 2 units for every 50 mg/dl above 150 mg/dl (give 10 units at bedtime for after dinner snacking)  - check bs 3 times per day  - foot exam done 3/20  - microalbumin 106 in 3/16, down to 57 in 10/16 and stable in 6/17, up to 109 in 11/17--I added back 20 of benazepril in the form of lotrel 5/20 and down to 58 in 3/19 (will be careful of hyperkalemia as had this in the past) and increased to 10/40 and back to normal at 13.7 but K up to 5.6.  K down to 4.3 in 3/20.  - eye exam 11/19  - check Hgb A1c and cmp and microalbumin prior to next visit      2. Essential hypertension, benign: his BP was above goal < 140/90 but home readings are at goal. Does have history of hyperaldosteronism that was surgically treated by left adrenalectomy in 3/12. His K was higher but his sugar was over 300 on his lab draw in 9/19 but back to normal in 3/20  - cont amlodipine 10 mg daily  - cont benazepril 40 mg daily  - cont hctz 12.5 mg daily  - monitor home blood pressure readings        3. Hypercholesterolemia: Given DM, Goal LDL < 100, non-HDL < 130, and TG < 150.  in 3/16. Up to 171 in 7/16 with non-compliance but down to 93 in 10/16 with compliance and 94 in 3/17 and 110 in 6/17 and 94 in 9/17 and 112 in 11/17. Up to 127 in 3/19 due to missing doses and 109 in 9/19 with better compliance. Up to 116 in 3/20 and down to 106 in 9/20 and 103 in 3/21.  - cont lipitor 80 mg daily  - check lipids prior to next visit         Patient Instructions   1) Your Hemoglobin A1c (3 month test of blood sugar) went up slightly from 7.8% to 8.2% and you will need to get this under 8% if you do want to have knee surgery.     2) Try not to snack on anything with sugar after dinner as this will cause your blood sugar to be higher the next morning. You can try sugar free jello or pudding or raw veggies or nuts as these won't cause your sugar to spike overnight. If you do have something with sugar, I would take 10 units at bedtime to prevent spikes in the morning. 3) Your creatinine (kidney test) is stable. 4) Your LDL (bad cholesterol) is just above goal of 100 or less. 5) Please come for a follow up visit on 9/20/21 at 3:50pm in our [address] [place] [address] [place] office. 6) I put an order directly into the [facility] system to repeat your labs in the 1-2 weeks prior to your next visit so just ask for the order under my name and you will receive a courtesy reminder through [employer] to have these drawn.             Follow-up and Dispositions    · Return 9/20/21 at 3:50pm.               Copy sent to:  Dr. Enzo De Leon via [facility]  Dr. Juaquin Hager

## 2021-03-15 NOTE — PATIENT INSTRUCTIONS
1) Your Hemoglobin A1c (3 month test of blood sugar) went up slightly from 7.8% to 8.2% and you will need to get this under 8% if you do want to have knee surgery. 2) Try not to snack on anything with sugar after dinner as this will cause your blood sugar to be higher the next morning. You can try sugar free jello or pudding or raw veggies or nuts as these won't cause your sugar to spike overnight. If you do have something with sugar, I would take 10 units at bedtime to prevent spikes in the morning. 3) Your creatinine (kidney test) is stable. 4) Your LDL (bad cholesterol) is just above goal of 100 or less. 5) Please come for a follow up visit on 9/20/21 at 3:50pm in our [place] office. 6) I put an order directly into the [facility] system to repeat your labs in the 1-2 weeks prior to your next visit so just ask for the order under my name and you will receive a courtesy reminder through [employer] to have these drawn.

## 2021-05-09 DIAGNOSIS — E11.21 TYPE 2 DIABETES WITH NEPHROPATHY (HCC): ICD-10-CM

## 2021-05-09 RX ORDER — FLASH GLUCOSE SENSOR
KIT MISCELLANEOUS
Qty: 2 KIT | Refills: 11 | Status: SHIPPED | OUTPATIENT
Start: 2021-05-09 | End: 2022-04-17

## 2021-08-23 ENCOUNTER — TELEPHONE (OUTPATIENT)
Dept: ENDOCRINOLOGY | Age: 61
End: 2021-08-23

## 2021-08-23 NOTE — TELEPHONE ENCOUNTER
8/23/2021  11:38 AM    Taylor Stratton from [employer] calling to check on the pre-auth for patient.      [phone]    Thanks,  Ismael Berry

## 2021-08-23 NOTE — TELEPHONE ENCOUNTER
Pratima Giron with [employer] was notified of the message per Dr. Fredi Worley and voiced understanding.

## 2021-08-23 NOTE — TELEPHONE ENCOUNTER
Please let one of the reps from [employer] know I need to speak to the patient first about this before completing any paperwork and the patient and I will decide if we will be moving forward with this pump or not.

## 2021-08-23 NOTE — TELEPHONE ENCOUNTER
----- Message from [facility] sent at 8/23/2021 11:44 AM EDT -----  Regarding: Dr. Leah Dhillon telephone  General Message/Vendor Calls    Caller's first and last name: Shanon Gaines      Reason for call: prior authorization      Callback required yes/no and why: yes       Best contact number(s): [phone]      Details to clarify the request: He stated a prior authorization is needed to be completed for the [employer]. He stated their fax number is [phone].       [facility] [employer]

## 2021-08-24 NOTE — TELEPHONE ENCOUNTER
----- Message from Rebecca Palacios sent at 8/23/2021  4:22 PM EDT -----  Regarding: Dr. Mihai Jimenez: [phone]  General Message/Vendor Calls    Caller's first and last name:Pt      Reason for call:Pt would like a call back to discuss being a candidate for the [employer]ipod system. Callback required yes/no and why:Yes, discuss.        Best contact number(s):[phone]        Details to clarify the request:n/a      Rebecca Palacios

## 2021-08-24 NOTE — TELEPHONE ENCOUNTER
Called and spoke with pt. I told him that I had received [employer] forms last week and held on completing them as I had never heard from him that he was interested in a pump and we had never discussed this before. I asked him how he came to pursuing this and he said his wife saw a commercial on TV about this and told him to pursue this so they contacted the company and this led to the forms being faxed to me. I told him that there is a lot to discuss in terms of going on an insulin pump and before filling out any forms, I feel I need to discuss this in more detail with him in person next month at his visit and I will hold onto his forms until I see him next month and he is agreeable to this plan.

## 2021-09-06 DIAGNOSIS — I10 ESSENTIAL HYPERTENSION, BENIGN: ICD-10-CM

## 2021-09-06 DIAGNOSIS — E78.00 HYPERCHOLESTEROLEMIA: ICD-10-CM

## 2021-09-06 DIAGNOSIS — E11.21 TYPE 2 DIABETES WITH NEPHROPATHY (HCC): ICD-10-CM

## 2021-09-18 LAB
ALBUMIN SERPL-MCNC: 4.9 G/DL (ref 3.8–4.8)
ALBUMIN/CREAT UR: 258 MG/G CREAT (ref 0–29)
ALBUMIN/GLOB SERPL: 1.9 {RATIO} (ref 1.2–2.2)
ALP SERPL-CCNC: 114 IU/L (ref 44–121)
ALT SERPL-CCNC: 31 IU/L (ref 0–44)
AST SERPL-CCNC: 22 IU/L (ref 0–40)
BILIRUB SERPL-MCNC: 0.4 MG/DL (ref 0–1.2)
BUN SERPL-MCNC: 20 MG/DL (ref 8–27)
BUN/CREAT SERPL: 16 (ref 10–24)
CALCIUM SERPL-MCNC: 9.5 MG/DL (ref 8.6–10.2)
CHLORIDE SERPL-SCNC: 105 MMOL/L (ref 96–106)
CHOLEST SERPL-MCNC: 222 MG/DL (ref 100–199)
CO2 SERPL-SCNC: 27 MMOL/L (ref 20–29)
CREAT SERPL-MCNC: 1.23 MG/DL (ref 0.76–1.27)
CREAT UR-MCNC: 125 MG/DL
EST. AVERAGE GLUCOSE BLD GHB EST-MCNC: 171 MG/DL
GLOBULIN SER CALC-MCNC: 2.6 G/DL (ref 1.5–4.5)
GLUCOSE SERPL-MCNC: 155 MG/DL (ref 65–99)
HBA1C MFR BLD: 7.6 % (ref 4.8–5.6)
HDLC SERPL-MCNC: 74 MG/DL
IMP & REVIEW OF LAB RESULTS: NORMAL
LDLC SERPL CALC-MCNC: 137 MG/DL (ref 0–99)
MICROALBUMIN UR-MCNC: 322.7 UG/ML
POTASSIUM SERPL-SCNC: 4.1 MMOL/L (ref 3.5–5.2)
PROT SERPL-MCNC: 7.5 G/DL (ref 6–8.5)
SODIUM SERPL-SCNC: 142 MMOL/L (ref 134–144)
TRIGL SERPL-MCNC: 63 MG/DL (ref 0–149)
VLDLC SERPL CALC-MCNC: 11 MG/DL (ref 5–40)

## 2021-09-20 ENCOUNTER — OFFICE VISIT (OUTPATIENT)
Dept: ENDOCRINOLOGY | Age: 61
End: 2021-09-20
Payer: COMMERCIAL

## 2021-09-20 VITALS
SYSTOLIC BLOOD PRESSURE: 160 MMHG | WEIGHT: 186.8 LBS | DIASTOLIC BLOOD PRESSURE: 79 MMHG | HEART RATE: 68 BPM | BODY MASS INDEX: 27.67 KG/M2 | HEIGHT: 69 IN

## 2021-09-20 DIAGNOSIS — E11.21 TYPE 2 DIABETES WITH NEPHROPATHY (HCC): Primary | ICD-10-CM

## 2021-09-20 DIAGNOSIS — E78.00 HYPERCHOLESTEROLEMIA: ICD-10-CM

## 2021-09-20 DIAGNOSIS — I10 ESSENTIAL HYPERTENSION, BENIGN: ICD-10-CM

## 2021-09-20 PROCEDURE — 3051F HG A1C>EQUAL 7.0%<8.0%: CPT | Performed by: INTERNAL MEDICINE

## 2021-09-20 PROCEDURE — 99214 OFFICE O/P EST MOD 30 MIN: CPT | Performed by: INTERNAL MEDICINE

## 2021-09-20 RX ORDER — ROSUVASTATIN CALCIUM 20 MG/1
20 TABLET, COATED ORAL DAILY
Qty: 90 TABLET | Refills: 3 | Status: SHIPPED | OUTPATIENT
Start: 2021-09-20 | End: 2022-03-21 | Stop reason: SDUPTHER

## 2021-09-20 NOTE — PROGRESS NOTES
Chief Complaint   Patient presents with    Diabetes     pcp and pharmacy confirmed    Other     form singed for eye report     History of Present Illness: Harshad Bunn is a 56 y.o. male here for follow up of diabetes. Has been compliant with his insulin scale and using the freestyle candy and this has helped with identifying trends to keep his sugars mostly in the 100-200 range. He has thought more about using the Omnipod pump and at this time has decided to hold on moving forward with this and I told him I agree with this plan especially since this will require more focus on carb counting and his A1c is the best it's been in 5 years. He admits to missing his lipitor a few times a week as it is a large pill and is hard to swallow at times. Normally gets his BP meds as directed and when he checked his BP at home over a month ago his readings were under 792 systolic and I was running an hour behind so we agreed not to make any changes and check some more readings at home. He is debating whether he wants to have knee surgery and I told him if he wants to move forward, he should do so now that his A1c is back under 8%. Current Outpatient Medications   Medication Sig    insulin lispro protamine/insulin lispro (HumaLOG Mix 75-25,U-100,Insuln) 100 unit/mL (75-25) injection 34 units in am and 14 at lunch and 28 units in pm + 2 units for every 50 mg/dl above 150 mg/dl-- units/day    FreeStyle Candy 14 Day Sensor kit USE AS DIRECTED    benazepriL (LOTENSIN) 40 mg tablet Take 1 Tab by mouth daily. Replaces amlodipine-benazepril 10/40 mg tabs    atorvastatin (LIPITOR) 80 mg tablet TAKE 1 TABLET BY MOUTH EVERY DAY    amLODIPine (NORVASC) 10 mg tablet Take 1 Tab by mouth daily.  hydroCHLOROthiazide (HYDRODIURIL) 12.5 mg tablet Take 1 Tab by mouth daily.  For blood pressure    Insulin Syringe-Needle U-100 (BD Insulin Syringe Ultra-Fine) 1 mL 30 gauge x 1/2\" syrg Inject 3 times daily   [facility] [facility] 14 Day [place] misc Use as directed    ONETOUCH ULTRA BLUE TEST STRIP strip USE AS DIRECTED (Patient not taking: Reported on 9/20/2021)     No current facility-administered medications for this visit. Allergies   Allergen Reactions    Benazepril Other (comments)     hyperkalemia     Review of Systems: PER HPI    Physical Examination:  Blood pressure (!) 160/79, pulse 68, height 5' 9\" (1.753 m), weight 186 lb 12.8 oz (84.7 kg). - General: pleasant, no distress, good eye contact   - Neck: no carotid bruits  - Cardiovascular: regular, normal rate, nl s1 and s2, no m/r/g,   - Respiratory: clear bilaterally  - Integumentary: no edema,   - Psychiatric: normal mood and affect    Data Reviewed:   Component      Latest Ref Rng & Units 9/16/2021 9/16/2021 9/16/2021 9/16/2021           3:55 PM  3:55 PM  3:55 PM  3:55 PM   Glucose      65 - 99 mg/dL  155 (H)     BUN      8 - 27 mg/dL  20     Creatinine      0.76 - 1.27 mg/dL  1.23     GFR est non-AA      >59 mL/min/1.73  63     GFR est AA      >59 mL/min/1.73  72     BUN/Creatinine ratio      10 - 24  16     Sodium      134 - 144 mmol/L  142     Potassium      3.5 - 5.2 mmol/L  4.1     Chloride      96 - 106 mmol/L  105     CO2      20 - 29 mmol/L  27     Calcium      8.6 - 10.2 mg/dL  9.5     Protein, total      6.0 - 8.5 g/dL  7.5     Albumin      3.8 - 4.8 g/dL  4.9 (H)     GLOBULIN, TOTAL      1.5 - 4.5 g/dL  2.6     A-G Ratio      1.2 - 2.2  1.9     Bilirubin, total      0.0 - 1.2 mg/dL  0.4     Alk. phosphatase      44 - 121 IU/L  114     AST      0 - 40 IU/L  22     ALT      0 - 44 IU/L  31     Cholesterol, total      100 - 199 mg/dL 222 (H)      Triglyceride      0 - 149 mg/dL 63      HDL Cholesterol      >39 mg/dL 74      VLDL, calculated      5 - 40 mg/dL 11      LDL, calculated      0 - 99 mg/dL 137 (H)      Creatinine, urine      Not Estab. mg/dL    125.0   Microalbumin, urine      Not Estab. ug/mL    322.7   Microalbumin/Creat.  Ratio      0 - 29 mg/g creat 258 (H)   Hemoglobin A1c, (calculated)      4.8 - 5.6 %   7.6 (H)    Estimated average glucose      mg/dL   171        Assessment/Plan:     1. Type 1 diabetes, uncontrolled, with retinopathy ([facility] [address]): his most recent Hgb A1c was 7.6% in 9/21 down from 8.2% in 3/21 up from 7.8% in 9/20 down from 8.6% in 3/20 down from 8.9% in 9/19 up from 8.3% in 3/19 up from 8.2% in 10/18 down from 11.3% in 7/18 up from 8.3% in 11/17 down from 8.5% in 9/17 down from 9% in 6/17 up from 8.3% in 2/17 down from 9.5% in 10/16 stable from 7/16 down from 10% in 3/16. A1c is back at goal under 8% and is the best it's been in 5 years so praised him for this. He is stable from an endocrine perspective to proceed with knee surgery if he desires. - cont humalog 75/25 mix insulin 34 units before breakfast and 14 units before lunch and 28 units before dinner plus 2 units for every 50 mg/dl above 150 mg/dl (give 10 units at bedtime for after dinner snacking)  - check bs 3 times per day  - foot exam done 3/20  - microalbumin 106 in 3/16, down to 57 in 10/16 and stable in 6/17, up to 109 in 11/17--I added back 20 of benazepril in the form of lotrel 5/20 and down to 58 in 3/19 (will be careful of hyperkalemia as had this in the past) and increased to 10/40 and back to normal at 13.7 in 9/19 but K up to 5.6.  K down to 4.3 in 3/20 and MA 25. MA up to 258 in 9/21 for unclear reasons. - eye exam 11/19  - check Hgb A1c and cmp and microalbumin prior to next visit        2. Essential hypertension, benign: his BP was above goal < 140/90 but home readings are at goal. Does have history of hyperaldosteronism that was surgically treated by left adrenalectomy in 3/12. His K was higher but his sugar was over 300 on his lab draw in 9/19 but back to normal in 3/20  - cont amlodipine 10 mg daily  - cont benazepril 40 mg daily  - cont hctz 12.5 mg daily  - monitor home blood pressure readings        3.  Hypercholesterolemia: Given DM, Goal LDL < 100, non-HDL < 130, and TG < 150.  in 3/16. Up to 171 in 7/16 with non-compliance but down to 93 in 10/16 with compliance and 94 in 3/17 and 110 in 6/17 and 94 in 9/17 and 112 in 11/17. Up to 127 in 3/19 due to missing doses and 109 in 9/19 with better compliance. Up to 116 in 3/20 and down to 106 in 9/20 and 103 in 3/21. Up to 137 in 9/21 with missing doses due to pill size so stopped lipitor 80 and changed to crestor 20 which is a smaller pill. - begin crestor 20 mg daily  - stop lipitor 80 mg daily  - check lipids prior to next visit         Patient Instructions   1) Your Hemoglobin A1c (3 month test of blood sugar) was 7.6% which is the best it's been in 5 years. Keep up the good work. 2) I recommend we hold on the insulin pump for now as I'm not convinced this will give you better control and will require more work but if you change your mind in the future, let me know. 3) Stop the atorvastatin as this pill is quite large and change to rosuvastatin (crestor) 20 mg 1 tab daily in the morning to help get your LDL (bad cholesterol) back under 100 as you have been missing the atorvastatin too frequently. This will be ready for  at the pharmacy today. Watch out for any muscle aches or cramps with this medication and let me know if they develop. We will watch your liver tests very carefully while on this medication. 4) Start monitoring blood pressure about 2-3 times per week at alternating times either in the morning or evening after resting for 5 minutes and sitting upright in a chair with your arm at heart level. Please let me know if you are having readings over 140 on the top number or 90 on the bottom number. Follow-up and Dispositions    · Return in about 6 months (around 3/20/2022).                Copy sent to:  Dr. Belle Schrader via [facility]  Dr. Darren Gustafson

## 2021-09-20 NOTE — PATIENT INSTRUCTIONS
1) Your Hemoglobin A1c (3 month test of blood sugar) was 7.6% which is the best it's been in 5 years. Keep up the good work. 2) I recommend we hold on the insulin pump for now as I'm not convinced this will give you better control and will require more work but if you change your mind in the future, let me know. 3) Stop the atorvastatin as this pill is quite large and change to rosuvastatin (crestor) 20 mg 1 tab daily in the morning to help get your LDL (bad cholesterol) back under 100 as you have been missing the atorvastatin too frequently. This will be ready for  at the pharmacy today. Watch out for any muscle aches or cramps with this medication and let me know if they develop. We will watch your liver tests very carefully while on this medication. 4) Start monitoring blood pressure about 2-3 times per week at alternating times either in the morning or evening after resting for 5 minutes and sitting upright in a chair with your arm at heart level. Please let me know if you are having readings over 140 on the top number or 90 on the bottom number.

## 2021-11-09 RX ORDER — SYRINGE-NEEDLE,INSULIN,0.5 ML 27GX1/2"
SYRINGE, EMPTY DISPOSABLE MISCELLANEOUS
Qty: 300 EACH | Refills: 3 | Status: SHIPPED | OUTPATIENT
Start: 2021-11-09 | End: 2022-05-13 | Stop reason: SDUPTHER

## 2021-12-22 ENCOUNTER — APPOINTMENT (OUTPATIENT)
Dept: GENERAL RADIOLOGY | Age: 61
End: 2021-12-22
Attending: EMERGENCY MEDICINE
Payer: COMMERCIAL

## 2021-12-22 ENCOUNTER — HOSPITAL ENCOUNTER (EMERGENCY)
Age: 61
Discharge: HOME | End: 2021-12-22
Attending: EMERGENCY MEDICINE
Payer: COMMERCIAL

## 2021-12-22 ENCOUNTER — APPOINTMENT (OUTPATIENT)
Dept: ULTRASOUND IMAGING | Age: 61
End: 2021-12-22
Attending: EMERGENCY MEDICINE
Payer: COMMERCIAL

## 2021-12-22 VITALS
DIASTOLIC BLOOD PRESSURE: 65 MMHG | HEIGHT: 68 IN | HEART RATE: 73 BPM | WEIGHT: 189.15 LBS | BODY MASS INDEX: 28.67 KG/M2 | TEMPERATURE: 97.8 F | OXYGEN SATURATION: 98 % | RESPIRATION RATE: 18 BRPM | SYSTOLIC BLOOD PRESSURE: 170 MMHG

## 2021-12-22 DIAGNOSIS — M79.605 ACUTE LEG PAIN, LEFT: Primary | ICD-10-CM

## 2021-12-22 DIAGNOSIS — M17.12 ARTHRITIS OF KNEE, LEFT: ICD-10-CM

## 2021-12-22 DIAGNOSIS — M19.072 ARTHRITIS OF LEFT ANKLE: ICD-10-CM

## 2021-12-22 PROCEDURE — 73562 X-RAY EXAM OF KNEE 3: CPT

## 2021-12-22 PROCEDURE — 74011250637 HC RX REV CODE- 250/637: Performed by: EMERGENCY MEDICINE

## 2021-12-22 PROCEDURE — 93971 EXTREMITY STUDY: CPT

## 2021-12-22 PROCEDURE — 99283 EMERGENCY DEPT VISIT LOW MDM: CPT

## 2021-12-22 PROCEDURE — 73610 X-RAY EXAM OF ANKLE: CPT

## 2021-12-22 PROCEDURE — 73560 X-RAY EXAM OF KNEE 1 OR 2: CPT

## 2021-12-22 RX ORDER — METHOCARBAMOL 750 MG/1
750 TABLET, FILM COATED ORAL
Qty: 20 TABLET | Refills: 0 | Status: SHIPPED | OUTPATIENT
Start: 2021-12-22 | End: 2022-03-21

## 2021-12-22 RX ORDER — METHOCARBAMOL 750 MG/1
750 TABLET, FILM COATED ORAL
Status: COMPLETED | OUTPATIENT
Start: 2021-12-22 | End: 2021-12-22

## 2021-12-22 RX ADMIN — METHOCARBAMOL 750 MG: 750 TABLET ORAL at 18:11

## 2021-12-22 NOTE — ED PROVIDER NOTES
EMERGENCY DEPARTMENT HISTORY AND PHYSICAL EXAM      Date: 12/22/2021  Patient Name: Maci Wang    History of Presenting Illness     Chief Complaint   Patient presents with    Leg Pain     2 days ago getting out of truck felt pain left ankle radiating up toward left knee. no specific injury. History Provided By: Patient     HPI: aMci Wang, 59 y.o. male presents to the ED with cc of left leg pain. Patient symptoms started 2 days ago. States that he was getting out of his truck and started to feel pain in the left knee and ankle. He denies any specific injury. Says that the pain was alleviated with Aleve. Pain was a 7 out of 10 yesterday and is currently a 3 out of 10 in severity. He denies chest pain, cough, fever or shortness of breath. He denies numbness or tingling. There are no other complaints, changes, or physical findings at this time. PCP: Tamie Villalpando MD    No current facility-administered medications on file prior to encounter. Current Outpatient Medications on File Prior to Encounter   Medication Sig Dispense Refill    Insulin Syringe-Needle U-100 (BD Insulin Syringe Ultra-Fine) 1 mL 30 gauge x 1/2\" syrg INJECT THREE TIMES DAILY 300 Each 3    rosuvastatin (CRESTOR) 20 mg tablet Take 1 Tablet by mouth daily. Replaces atorvastatin for cholesterol 90 Tablet 3    insulin lispro protamine/insulin lispro (HumaLOG Mix 75-25,U-100,Insuln) 100 unit/mL (75-25) injection 34 units in am and 14 at lunch and 28 units in pm + 2 units for every 50 mg/dl above 150 mg/dl-- units/day 90 mL 3    FreeStyle Candy 14 Day Sensor kit USE AS DIRECTED 2 Kit 11    benazepriL (LOTENSIN) 40 mg tablet Take 1 Tab by mouth daily. Replaces amlodipine-benazepril 10/40 mg tabs 90 Tab 3    amLODIPine (NORVASC) 10 mg tablet Take 1 Tab by mouth daily. 90 Tab 3    hydroCHLOROthiazide (HYDRODIURIL) 12.5 mg tablet Take 1 Tab by mouth daily.  For blood pressure 90 Tab 3    FreeStyle Candy 14 Day [place] misc Use as directed 1 Each 1    ONETOUCH ULTRA BLUE TEST STRIP strip USE AS DIRECTED (Patient not taking: Reported on 9/20/2021) 300 Strip 2       Past History     Past Medical History:  Past Medical History:   Diagnosis Date    ARF (acute renal failure) ([address] 1/11/2013    Arthritis     knees    CKD stage 3 due to type 2 diabetes mellitus ([address]     Diabetes ([address] 1982    Type 1    DKA [facility]diabetic ketoacidoses) ([facility] [address]     Hyperaldosteronism ([address]     s/p surgery    Hypercholesterolemia 11/30/2010    Hypertension 11/30/2010    Ventricular bigeminy 6/29/2011       Past Surgical History:  Past Surgical History:   Procedure Laterality Date    COLONOSCOPY  4-09    kat- normal    ENDOSCOPY, COLON, DIAGNOSTIC  2009    Dr Andrade Obando    HX AMPUTATION TOE  03/2018    right 2nd toe    HX BUNIONECTOMY Right     HX CATARACT REMOVAL Right     HX CATARACT REMOVAL Left 07/06/2020    HX HEENT      laser surgery left eye    DE EXCISE ADRENAL GLAND  [id number]    left adenoma removed for hyperaldosteronism       Family History:  Family History   Problem Relation Age of Onset    Hypertension Father     Diabetes Father         Type 2    Diabetes Brother         Type 2       Social History:  Social History     Tobacco Use    Smoking status: Never Smoker    Smokeless tobacco: Never Used   Substance Use Topics    Alcohol use: Yes     Alcohol/week: 2.0 standard drinks     Types: 2 Cans of beer per week     Comment: may have beer once a week or mixed drink 2 times a month    Drug use: No       Allergies: Allergies   Allergen Reactions    Benazepril Other (comments)     hyperkalemia         Review of Systems   Review of Systems   Constitutional: Negative for fever. HENT: Negative for congestion. Eyes: Negative. Respiratory: Negative for shortness of breath. Cardiovascular: Negative for chest pain. Gastrointestinal: Negative for abdominal pain. Endocrine: Negative for heat intolerance. Genitourinary: Negative for flank pain. Musculoskeletal: Negative for back pain. Skin: Negative for rash. Allergic/Immunologic: Negative for immunocompromised state. Neurological: Negative for dizziness. Hematological: Does not bruise/bleed easily. Psychiatric/Behavioral: Negative. All other systems reviewed and are negative. Physical Exam   Physical Exam  Vitals and nursing note reviewed. Constitutional:       General: He is not in acute distress. Appearance: He is well-developed. HENT:      Head: Normocephalic and atraumatic. Cardiovascular:      Rate and Rhythm: Normal rate and regular rhythm. Pulses: Normal pulses. Heart sounds: Normal heart sounds. Pulmonary:      Effort: Pulmonary effort is normal.      Breath sounds: Normal breath sounds. Abdominal:      General: Bowel sounds are normal.      Palpations: Abdomen is soft. Musculoskeletal:         General: Normal range of motion. Cervical back: Normal range of motion. Skin:     General: Skin is warm and dry. Neurological:      General: No focal deficit present. Mental Status: He is alert and oriented to person, place, and time. Coordination: Coordination normal.   Psychiatric:         Mood and Affect: Mood normal.         Behavior: Behavior normal.         Diagnostic Study Results     Labs -   No results found for this or any previous visit (from the past 12 hour(s)). Radiologic Studies -   DUPLEX LOWER EXT VENOUS LEFT   Final Result   :  NO DVT OF THE LEFT LEG. XR ANKLE LT MIN 3 V   Final Result   No acute abnormality. XR KNEE LT 3 V   Final Result   No acute abnormality. CT Results  (Last 48 hours)    None        CXR Results  (Last 48 hours)    None          Medical Decision Making   I am the first provider for this patient. I reviewed the vital signs, available nursing notes, past medical history, past surgical history, family history and social history.     Vital Signs-Reviewed the patient's vital signs. Patient Vitals for the past 12 hrs:   Temp Pulse Resp BP SpO2   12/22/21 1426 98.3 °F (36.8 °C) 69 14 (!) 180/76 100 %         Records Reviewed: Nursing Notes and Old Medical Records    Provider Notes (Medical Decision Making):   Pain, arthritis, DVT, Baker's cyst    ED Course:   Initial assessment performed. The patients presenting problems have been discussed, and they are in agreement with the care plan formulated and outlined with them. I have encouraged them to ask questions as they arise throughout their visit. Progress note:    Patient's results were reviewed. The patient is advised to follow-up and return to ER if worse             Critical Care Time:   0    Disposition:  home    DISCHARGE PLAN:  1. Discharge Medication List as of 12/22/2021  5:58 PM      START taking these medications    Details   methocarbamoL (ROBAXIN) 750 mg tablet Take 1 Tablet by mouth four (4) times daily as needed for Muscle Spasm(s). , Normal, Disp-20 Tablet, R-0         CONTINUE these medications which have NOT CHANGED    Details   Insulin Syringe-Needle U-100 (BD Insulin Syringe Ultra-Fine) 1 mL 30 gauge x 1/2\" syrg INJECT THREE TIMES DAILY, Normal, Disp-300 Each, R-3      rosuvastatin (CRESTOR) 20 mg tablet Take 1 Tablet by mouth daily. Replaces atorvastatin for cholesterol, Normal, Disp-90 Tablet, R-3      insulin lispro protamine/insulin lispro (HumaLOG Mix 75-25,U-100,Insuln) 100 unit/mL (75-25) injection 34 units in am and 14 at lunch and 28 units in pm + 2 units for every 50 mg/dl above 150 mg/dl-- units/day, Normal, Disp-90 mL, R-3      FreeStyle Candy 14 Day Sensor kit USE AS DIRECTED, Normal, Disp-2 Kit, R-11, DAWDX Code Needed  . benazepriL (LOTENSIN) 40 mg tablet Take 1 Tab by mouth daily. Replaces amlodipine-benazepril 10/40 mg tabs, Normal, Disp-90 Tab, R-3      amLODIPine (NORVASC) 10 mg tablet Take 1 Tab by mouth daily. , Normal, Disp-90 Tab, R-3 hydroCHLOROthiazide (HYDRODIURIL) 12.5 mg tablet Take 1 Tab by mouth daily. For blood pressure, Normal, Disp-90 Tab, R-3      FreeStyle Candy 14 Day [place] misc Use as directed, Normal, Disp-1 Each,R-1,JOSE EDUARDO      ONETOUCH ULTRA BLUE TEST STRIP strip USE AS DIRECTED, Normal, Disp-300 Strip, R-2           2. Follow-up Information     Follow up With Specialties Details Why Contact Info    Memo Hicks MD Family Medicine  As needed Aracelis Iverson.  [phone]      [facility] EMERGENCY DEPT Emergency Medicine  If symptoms worsen [address]  [address] [facility]  [phone]        3. Return to ED if worse     Diagnosis     Clinical Impression:   1. Acute leg pain, left    2. Arthritis of knee, left    3. Arthritis of left ankle        Attestations:    Cherelle Park MD    Please note that this dictation was completed with [email or web address], the computer voice recognition software. Quite often unanticipated grammatical, syntax, homophones, and other interpretive errors are inadvertently transcribed by the computer software. Please disregard these errors. Please excuse any errors that have escaped final proofreading. Thank you.

## 2022-02-18 RX ORDER — BENAZEPRIL HYDROCHLORIDE 40 MG/1
TABLET ORAL
Qty: 90 TABLET | Refills: 3 | Status: SHIPPED | OUTPATIENT
Start: 2022-02-18

## 2022-02-18 RX ORDER — AMLODIPINE BESYLATE 10 MG/1
TABLET ORAL
Qty: 90 TABLET | Refills: 3 | Status: SHIPPED | OUTPATIENT
Start: 2022-02-18

## 2022-02-18 RX ORDER — HYDROCHLOROTHIAZIDE 12.5 MG/1
TABLET ORAL
Qty: 90 TABLET | Refills: 3 | Status: SHIPPED | OUTPATIENT
Start: 2022-02-18

## 2022-03-04 DIAGNOSIS — E11.21 TYPE 2 DIABETES WITH NEPHROPATHY (HCC): ICD-10-CM

## 2022-03-04 DIAGNOSIS — E78.00 HYPERCHOLESTEROLEMIA: ICD-10-CM

## 2022-03-04 DIAGNOSIS — I10 ESSENTIAL HYPERTENSION, BENIGN: ICD-10-CM

## 2022-03-16 LAB
ALBUMIN SERPL-MCNC: 4.4 G/DL (ref 3.8–4.8)
ALBUMIN/CREAT UR: 85 MG/G CREAT (ref 0–29)
ALBUMIN/GLOB SERPL: 1.8 {RATIO} (ref 1.2–2.2)
ALP SERPL-CCNC: 116 IU/L (ref 44–121)
ALT SERPL-CCNC: 41 IU/L (ref 0–44)
AST SERPL-CCNC: 28 IU/L (ref 0–40)
BILIRUB SERPL-MCNC: 0.4 MG/DL (ref 0–1.2)
BUN SERPL-MCNC: 28 MG/DL (ref 8–27)
BUN/CREAT SERPL: 21 (ref 10–24)
CALCIUM SERPL-MCNC: 9.2 MG/DL (ref 8.6–10.2)
CHLORIDE SERPL-SCNC: 103 MMOL/L (ref 96–106)
CHOLEST SERPL-MCNC: 166 MG/DL (ref 100–199)
CO2 SERPL-SCNC: 23 MMOL/L (ref 20–29)
CREAT SERPL-MCNC: 1.33 MG/DL (ref 0.76–1.27)
CREAT UR-MCNC: 124.4 MG/DL
EGFR: 60 ML/MIN/1.73
EST. AVERAGE GLUCOSE BLD GHB EST-MCNC: 171 MG/DL
GLOBULIN SER CALC-MCNC: 2.5 G/DL (ref 1.5–4.5)
GLUCOSE SERPL-MCNC: 258 MG/DL (ref 65–99)
HBA1C MFR BLD: 7.6 % (ref 4.8–5.6)
HDLC SERPL-MCNC: 60 MG/DL
IMP & REVIEW OF LAB RESULTS: NORMAL
INTERPRETATION: NORMAL
LDLC SERPL CALC-MCNC: 92 MG/DL (ref 0–99)
MICROALBUMIN UR-MCNC: 106.3 UG/ML
POTASSIUM SERPL-SCNC: 4.8 MMOL/L (ref 3.5–5.2)
PROT SERPL-MCNC: 6.9 G/DL (ref 6–8.5)
SODIUM SERPL-SCNC: 141 MMOL/L (ref 134–144)
TRIGL SERPL-MCNC: 71 MG/DL (ref 0–149)
VLDLC SERPL CALC-MCNC: 14 MG/DL (ref 5–40)

## 2022-03-19 PROBLEM — E11.21 TYPE 2 DIABETES WITH NEPHROPATHY (HCC): Status: ACTIVE | Noted: 2018-03-13

## 2022-03-19 PROBLEM — R73.9 HYPERGLYCEMIA: Status: ACTIVE | Noted: 2018-07-06

## 2022-03-20 PROBLEM — N17.9 AKI (ACUTE KIDNEY INJURY) (HCC): Status: ACTIVE | Noted: 2018-07-06

## 2022-03-21 ENCOUNTER — OFFICE VISIT (OUTPATIENT)
Dept: ENDOCRINOLOGY | Age: 62
End: 2022-03-21
Payer: COMMERCIAL

## 2022-03-21 VITALS
SYSTOLIC BLOOD PRESSURE: 149 MMHG | HEART RATE: 64 BPM | BODY MASS INDEX: 28.35 KG/M2 | HEIGHT: 69 IN | WEIGHT: 191.4 LBS | DIASTOLIC BLOOD PRESSURE: 64 MMHG

## 2022-03-21 DIAGNOSIS — E11.21 TYPE 2 DIABETES WITH NEPHROPATHY (HCC): Primary | ICD-10-CM

## 2022-03-21 DIAGNOSIS — I10 ESSENTIAL HYPERTENSION, BENIGN: ICD-10-CM

## 2022-03-21 DIAGNOSIS — E78.00 HYPERCHOLESTEROLEMIA: ICD-10-CM

## 2022-03-21 PROCEDURE — 99214 OFFICE O/P EST MOD 30 MIN: CPT | Performed by: INTERNAL MEDICINE

## 2022-03-21 PROCEDURE — 3051F HG A1C>EQUAL 7.0%<8.0%: CPT | Performed by: INTERNAL MEDICINE

## 2022-03-21 RX ORDER — ROSUVASTATIN CALCIUM 20 MG/1
20 TABLET, COATED ORAL DAILY
Qty: 90 TABLET | Refills: 3 | Status: SHIPPED | OUTPATIENT
Start: 2022-03-21

## 2022-03-21 NOTE — PATIENT INSTRUCTIONS
1) Your Hemoglobin A1c (3 month test of blood sugar) is stable at 7.6% and now you have had 2 straight values under 8%. Keep up the good work. If you want to have knee surgery, I would consider doing this now while your A1c remains controlled. 2) Your creatinine (kidney test) was slightly high due to mild dehydration from your sugar being 258 on the day of the lab draw. Drink at least 4 8oz glasses of water everyday to stay hydrated to prevent your creatinine level from going higher. 3) ALT and AST are markers of liver function. Your values are normal.    4) Your cholesterol is back to normal on the rosuvastatin so stay on this in place of the atorvastatin and I sent a refill to [facility].    5) Your urine protein came back down. 6) Start monitoring blood pressure about 2-3 times per week at alternating times either in the morning or evening after resting for 5 minutes and sitting upright in a chair with your arm at heart level. Please let me know if you are having readings over 140 on the top number or 90 on the bottom number.

## 2022-03-21 NOTE — PROGRESS NOTES
Chief Complaint   Patient presents with    Diabetes     pcp and pharmacy confirmed    Other     eye exam is due     History of Present Illness: Fausto Gandhi is a 59 y.o. male here for follow up of diabetes. Weight up 5 lbs since last visit in 9/21. Still using candy to help control his sugars and fasting sugars are around 160 and sometimes lower and sometimes higher. By lunch can sometimes have sugars under 80 if he doesn't eat as much for breakfast.  Has found the crestor pills easier to swallow. Current Outpatient Medications   Medication Sig    hydroCHLOROthiazide (HYDRODIURIL) 12.5 mg tablet TAKE 1 TABLET DAILY FOR BLOOD PRESSURE    amLODIPine (NORVASC) 10 mg tablet TAKE 1 TABLET DAILY    benazepriL (LOTENSIN) 40 mg tablet TAKE 1 TABLET DAILY    rosuvastatin (CRESTOR) 20 mg tablet Take 1 Tablet by mouth daily. Replaces atorvastatin for cholesterol    insulin lispro protamine/insulin lispro (HumaLOG Mix 75-25,U-100,Insuln) 100 unit/mL (75-25) injection 34 units in am and 14 at lunch and 28 units in pm + 2 units for every 50 mg/dl above 150 mg/dl-- units/day    FreeStyle Candy 14 Day Sensor kit USE AS DIRECTED    FreeStyle Candy 14 Day [place] misc Use as directed    Insulin Syringe-Needle U-100 (BD Insulin Syringe Ultra-Fine) 1 mL 30 gauge x 1/2\" syrg INJECT THREE TIMES DAILY    ONETOUCH ULTRA BLUE TEST STRIP strip USE AS DIRECTED (Patient not taking: Reported on 9/20/2021)     No current facility-administered medications for this visit. Allergies   Allergen Reactions    Benazepril Other (comments)     Hyperkalemia ( patient is currently taking this medication)       Review of Systems: PER HPI    Physical Examination:  Blood pressure (!) 149/64, pulse 64, height 5' 9\" (1.753 m), weight 191 lb 6.4 oz (86.8 kg).   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   Pulse DP: 2+ (normal)   Filament test: reduced sensation    Vibratory sensation: diminished        Data Reviewed:   Component      Latest Ref Rng & Units 3/15/2022 3/15/2022 3/15/2022 3/15/2022           3:51 PM  3:51 PM  3:51 PM  3:51 PM   Glucose      65 - 99 mg/dL  258 (H)     BUN      8 - 27 mg/dL  28 (H)     Creatinine      0.76 - 1.27 mg/dL  1.33 (H)     eGFR      >59 mL/min/1.73  60     BUN/Creatinine ratio      10 - 24  21     Sodium      134 - 144 mmol/L  141     Potassium      3.5 - 5.2 mmol/L  4.8     Chloride      96 - 106 mmol/L  103     CO2      20 - 29 mmol/L  23     Calcium      8.6 - 10.2 mg/dL  9.2     Protein, total      6.0 - 8.5 g/dL  6.9     Albumin      3.8 - 4.8 g/dL  4.4     GLOBULIN, TOTAL      1.5 - 4.5 g/dL  2.5     A-G Ratio      1.2 - 2.2  1.8     Bilirubin, total      0.0 - 1.2 mg/dL  0.4     Alk. phosphatase      44 - 121 IU/L  116     AST      0 - 40 IU/L  28     ALT      0 - 44 IU/L  41     Cholesterol, total      100 - 199 mg/dL 166      Triglyceride      0 - 149 mg/dL 71      HDL Cholesterol      >39 mg/dL 60      VLDL, calculated      5 - 40 mg/dL 14      LDL, calculated      0 - 99 mg/dL 92      Creatinine, urine      Not Estab. mg/dL    124.4   Microalbumin, urine      Not Estab. ug/mL    106.3   Microalbumin/Creat. Ratio      0 - 29 mg/g creat    85 (H)   Hemoglobin A1c, (calculated)      4.8 - 5.6 %   7.6 (H)    Estimated average glucose      mg/dL   171      Assessment/Plan:     1.  Type 1 diabetes, uncontrolled, with retinopathy ([facility]): his most recent Hgb A1c was 7.6% in 3/22 stable from 9/21 down from 8.2% in 3/21 up from 7.8% in 9/20 down from 8.6% in 3/20 down from 8.9% in 9/19 up from 8.3% in 3/19 up from 8.2% in 10/18 down from 11.3% in 7/18 up from 8.3% in 11/17 down from 8.5% in 9/17 down from 9% in 6/17 up from 8.3% in 2/17 down from 9.5% in 10/16 stable from 7/16 down from 10% in 3/16. A1c remains at goal under 8%. He is stable from an endocrine perspective to proceed with knee surgery if he desires. - cont humalog 75/25 mix insulin 34 units before breakfast and 14 units before lunch and 28 units before dinner plus 2 units for every 50 mg/dl above 150 mg/dl (give 10 units at bedtime for after dinner snacking)  - check bs 3 times per day  - foot exam done 3/22  - microalbumin 106 in 3/16, down to 57 in 10/16 and stable in 6/17, up to 109 in 11/17--I added back 20 of benazepril in the form of lotrel 5/20 and down to 58 in 3/19 (will be careful of hyperkalemia as had this in the past) and increased to 10/40 and back to normal at 13.7 in 9/19 but K up to 5.6.  K down to 4.3 in 3/20 and MA 25. MA up to 258 in 9/21 for unclear reasons but down to 85 in 3/22  - eye exam 11/19  - check Hgb A1c and bmp prior to next visit        2. Essential hypertension, benign: his BP was above goal < 140/90 but home readings are at goal. Does have history of hyperaldosteronism that was surgically treated by left adrenalectomy in 3/12. His K was higher but his sugar was over 300 on his lab draw in 9/19 but back to normal in 3/20  - cont amlodipine 10 mg daily  - cont benazepril 40 mg daily  - cont hctz 12.5 mg daily  - monitor home blood pressure readings        3. Hypercholesterolemia: Given DM, Goal LDL < 100, non-HDL < 130, and TG < 150.  in 3/16. Up to 171 in 7/16 with non-compliance but down to 93 in 10/16 with compliance and 94 in 3/17 and 110 in 6/17 and 94 in 9/17 and 112 in 11/17. Up to 127 in 3/19 due to missing doses and 109 in 9/19 with better compliance. Up to 116 in 3/20 and down to 106 in 9/20 and 103 in 3/21.   Up to 137 in 9/21 with missing doses due to pill size so stopped lipitor 80 and changed to crestor 20 which is a smaller pill and down to 92 in 3/22  - cont crestor 20 mg daily  - check lipids prior to next visit         Patient Instructions   1) Your Hemoglobin A1c (3 month test of blood sugar) is stable at 7.6% and now you have had 2 straight values under 8%. Keep up the good work. If you want to have knee surgery, I would consider doing this now while your A1c remains controlled. 2) Your creatinine (kidney test) was slightly high due to mild dehydration from your sugar being 258 on the day of the lab draw. Drink at least 4 8oz glasses of water everyday to stay hydrated to prevent your creatinine level from going higher. 3) ALT and AST are markers of liver function. Your values are normal.    4) Your cholesterol is back to normal on the rosuvastatin so stay on this in place of the atorvastatin and I sent a refill to [facility].    5) Your urine protein came back down. 6) Start monitoring blood pressure about 2-3 times per week at alternating times either in the morning or evening after resting for 5 minutes and sitting upright in a chair with your arm at heart level. Please let me know if you are having readings over 140 on the top number or 90 on the bottom number. Follow-up and Dispositions    · Return in about 6 months (around 9/21/2022).                Copy sent to:  Dr. Royce Cooks via [facility]  Dr. Abiola Gregg

## 2022-04-17 DIAGNOSIS — E11.21 TYPE 2 DIABETES WITH NEPHROPATHY (HCC): ICD-10-CM

## 2022-04-17 RX ORDER — FLASH GLUCOSE SENSOR
KIT MISCELLANEOUS
Qty: 2 KIT | Refills: 11 | Status: SHIPPED | OUTPATIENT
Start: 2022-04-17

## 2022-05-13 RX ORDER — SYRINGE-NEEDLE,INSULIN,0.5 ML 27GX1/2"
SYRINGE, EMPTY DISPOSABLE MISCELLANEOUS
Qty: 300 EACH | Refills: 3 | Status: SHIPPED | OUTPATIENT
Start: 2022-05-13 | End: 2022-11-03 | Stop reason: SDUPTHER

## 2022-05-13 NOTE — TELEPHONE ENCOUNTER
5/13/2022    Pt called and left a vm at 11:20 am stating he needs a new prescription for BD Syringes. His insurance ([employer]) is not longer filling his BD Syringes. Pt can be reached at [phone].     Thanks,   Anita Harvey

## 2022-09-05 DIAGNOSIS — E78.00 HYPERCHOLESTEROLEMIA: ICD-10-CM

## 2022-09-05 DIAGNOSIS — E11.21 TYPE 2 DIABETES WITH NEPHROPATHY (HCC): ICD-10-CM

## 2022-09-05 DIAGNOSIS — I10 ESSENTIAL HYPERTENSION, BENIGN: ICD-10-CM

## 2022-11-03 RX ORDER — SYRINGE-NEEDLE,INSULIN,0.5 ML 27GX1/2"
SYRINGE, EMPTY DISPOSABLE MISCELLANEOUS
Qty: 300 EACH | Refills: 3 | Status: SHIPPED | OUTPATIENT
Start: 2022-11-03

## 2023-01-09 RX ORDER — ROSUVASTATIN CALCIUM 20 MG/1
TABLET, COATED ORAL
Qty: 90 TABLET | Refills: 3 | Status: SHIPPED | OUTPATIENT
Start: 2023-01-09

## 2023-01-17 LAB
ALBUMIN SERPL-MCNC: 4.2 G/DL (ref 3.8–4.8)
ALBUMIN/CREAT UR: 59 MG/G CREAT (ref 0–29)
ALBUMIN/GLOB SERPL: 1.7 {RATIO} (ref 1.2–2.2)
ALP SERPL-CCNC: 112 IU/L (ref 44–121)
ALT SERPL-CCNC: 27 IU/L (ref 0–44)
AST SERPL-CCNC: 21 IU/L (ref 0–40)
BILIRUB SERPL-MCNC: 0.5 MG/DL (ref 0–1.2)
BUN SERPL-MCNC: 27 MG/DL (ref 8–27)
BUN/CREAT SERPL: 18 (ref 10–24)
CALCIUM SERPL-MCNC: 9.2 MG/DL (ref 8.6–10.2)
CHLORIDE SERPL-SCNC: 110 MMOL/L (ref 96–106)
CHOLEST SERPL-MCNC: 228 MG/DL (ref 100–199)
CO2 SERPL-SCNC: 23 MMOL/L (ref 20–29)
CREAT SERPL-MCNC: 1.48 MG/DL (ref 0.76–1.27)
CREAT UR-MCNC: 93.9 MG/DL
EGFR: 53 ML/MIN/1.73
EST. AVERAGE GLUCOSE BLD GHB EST-MCNC: 186 MG/DL
GLOBULIN SER CALC-MCNC: 2.5 G/DL (ref 1.5–4.5)
GLUCOSE SERPL-MCNC: 106 MG/DL (ref 70–99)
HBA1C MFR BLD: 8.1 % (ref 4.8–5.6)
HDLC SERPL-MCNC: 70 MG/DL
IMP & REVIEW OF LAB RESULTS: NORMAL
INTERPRETATION: NORMAL
LDLC SERPL CALC-MCNC: 146 MG/DL (ref 0–99)
MICROALBUMIN UR-MCNC: 55 UG/ML
POTASSIUM SERPL-SCNC: 5 MMOL/L (ref 3.5–5.2)
PROT SERPL-MCNC: 6.7 G/DL (ref 6–8.5)
SODIUM SERPL-SCNC: 144 MMOL/L (ref 134–144)
TRIGL SERPL-MCNC: 67 MG/DL (ref 0–149)
VLDLC SERPL CALC-MCNC: 12 MG/DL (ref 5–40)

## 2023-01-20 ENCOUNTER — OFFICE VISIT (OUTPATIENT)
Dept: ENDOCRINOLOGY | Age: 63
End: 2023-01-20
Payer: COMMERCIAL

## 2023-01-20 VITALS
SYSTOLIC BLOOD PRESSURE: 152 MMHG | WEIGHT: 187.2 LBS | BODY MASS INDEX: 27.73 KG/M2 | HEART RATE: 65 BPM | DIASTOLIC BLOOD PRESSURE: 88 MMHG | HEIGHT: 69 IN

## 2023-01-20 DIAGNOSIS — I10 ESSENTIAL HYPERTENSION, BENIGN: ICD-10-CM

## 2023-01-20 DIAGNOSIS — E78.00 HYPERCHOLESTEROLEMIA: ICD-10-CM

## 2023-01-20 DIAGNOSIS — E11.21 TYPE 2 DIABETES WITH NEPHROPATHY (HCC): Primary | ICD-10-CM

## 2023-01-20 NOTE — PATIENT INSTRUCTIONS
1) Start monitoring blood pressure about 2-3 times per week at alternating times either in the morning or evening after resting for 5 minutes and sitting upright in a chair with your arm at heart level. Please let me know if you are having readings over 140 on the top number or 90 on the bottom number. 2) Your Hemoglobin A1c (3 month test of blood sugar) yovanny from 7.6% to 8.1% due to diet so I don't think we need to make any changes. 3) Your creatinine (kidney test) was slightly high due to mild dehydration. Drink at least 4 8oz glasses of water everyday to stay hydrated to prevent your creatinine level from going higher. 4) ALT and AST are markers of liver function. Your values are normal.    5) Your cholesterol yovanny due to being out of rosuvastatin prior to the lab draw so we'll check this again next time to ensure it's come back down. 6) Your urine protein has come down.

## 2023-01-20 NOTE — PROGRESS NOTES
Chief Complaint   Patient presents with    Diabetes     PCP and pharmacy confirmed      History of Present Illness: Kcaie Ortiz is a 57 y.o. male here for follow up of diabetes. Weight down 4 lbs since last visit in 3/22. Compliant with insulin and still using candy. He admits to being off track with diet over the holidays and has seen more fasting readings over 200 but if on track they are usually in the 140-150s. Hasn't checked his BP in the past few months but is compliant with BP regimen. Ran out of crestor a week before the lab draw to explain the rise in LDL. Current Outpatient Medications   Medication Sig    rosuvastatin (CRESTOR) 20 mg tablet TAKE 1 TABLET DAILY FOR CHOLESTEROL (REPLACES ATORVASTATIN)    Insulin Syringe-Needle U-100 (BD Insulin Syringe Ultra-Fine) 1 mL 30 gauge x 1/2\" syrg USE TO INJECT THREE TIMES A DAY    insulin lispro protamine/insulin lispro (HumaLOG Mix 75-25,U-100,Insuln) 100 unit/mL (75-25) injection USE AS INSTRUCTED BY YOUR PRESCRIBER    FreeStyle Candy 14 Day Sensor kit USE AS DIRECTED    hydroCHLOROthiazide (HYDRODIURIL) 12.5 mg tablet TAKE 1 TABLET DAILY FOR BLOOD PRESSURE    amLODIPine (NORVASC) 10 mg tablet TAKE 1 TABLET DAILY    benazepriL (LOTENSIN) 40 mg tablet TAKE 1 TABLET DAILY    FreeStyle Candy 14 Day [place] misc Use as directed    ONETOUCH ULTRA BLUE TEST STRIP strip USE AS DIRECTED     No current facility-administered medications for this visit. Allergies   Allergen Reactions    Benazepril Other (comments)     Hyperkalemia ( patient is currently taking this medication)       Review of Systems: PER HPI    Physical Examination:  Blood pressure (!) 152/88, pulse 65, height 5' 9\" (1.753 m), weight 187 lb 3.2 oz (84.9 kg).   General: pleasant, no distress, good eye contact   Neck: no carotid bruits  Cardiovascular: regular, normal rate, nl s1 and s2, no m/r/g,   Respiratory: clear bilaterally  Integumentary: no edema,   Psychiatric: normal mood and affect    Data Reviewed:   Component      Latest Ref Rng & Units 1/16/2023 1/16/2023 1/16/2023 1/16/2023           8:07 AM  8:07 AM  8:07 AM  8:07 AM   Glucose      70 - 99 mg/dL    106 (H)   BUN      8 - 27 mg/dL    27   Creatinine      0.76 - 1.27 mg/dL    1.48 (H)   eGFR      >59 mL/min/1.73    53 (L)   BUN/Creatinine ratio      10 - 24    18   Sodium      134 - 144 mmol/L    144   Potassium      3.5 - 5.2 mmol/L    5.0   Chloride      96 - 106 mmol/L    110 (H)   CO2      20 - 29 mmol/L    23   Calcium      8.6 - 10.2 mg/dL    9.2   Protein, total      6.0 - 8.5 g/dL    6.7   Albumin      3.8 - 4.8 g/dL    4.2   GLOBULIN, TOTAL      1.5 - 4.5 g/dL    2.5   A-G Ratio      1.2 - 2.2    1.7   Bilirubin, total      0.0 - 1.2 mg/dL    0.5   Alk. phosphatase      44 - 121 IU/L    112   AST      0 - 40 IU/L    21   ALT      0 - 44 IU/L    27   Cholesterol, total      100 - 199 mg/dL   228 (H)    Triglyceride      0 - 149 mg/dL   67    HDL Cholesterol      >39 mg/dL   70    VLDL, calculated      5 - 40 mg/dL   12    LDL, calculated      0 - 99 mg/dL   146 (H)    Creatinine, urine random      Not Estab. mg/dL  93.9     Microalbumin, urine      Not Estab. ug/mL  55.0     Microalbumin/Creat. Ratio      0 - 29 mg/g creat  59 (H)     Hemoglobin A1c, (calculated)      4.8 - 5.6 % 8.1 (H)      Estimated average glucose      mg/dL 186          Assessment/Plan:     1. Type 1 diabetes, uncontrolled, with retinopathy ([facility]): his most recent Hgb A1c was 8.1% in 1/23 up from 7.6% in 3/22 stable from 9/21 down from 8.2% in 3/21 up from 7.8% in 9/20 down from 8.6% in 3/20 down from 8.9% in 9/19 up from 8.3% in 3/19 up from 8.2% in 10/18 down from 11.3% in 7/18 up from 8.3% in 11/17 down from 8.5% in 9/17 down from 9% in 6/17 up from 8.3% in 2/17 down from 9.5% in 10/16 stable from 7/16 down from 10% in 3/16. A1c is above goal <7.5-8% due to diet so won't make any changes.   - cont humalog 75/25 mix insulin 34 units before breakfast and 14 units before lunch and 28 units before dinner plus 2 units for every 50 mg/dl above 150 mg/dl (give 10 units at bedtime for after dinner snacking)  - check bs 3 times per day  - foot exam done 3/22  - microalbumin 106 in 3/16, down to 57 in 10/16 and stable in 6/17, up to 109 in 11/17--I added back 20 of benazepril in the form of lotrel 5/20 and down to 58 in 3/19 (will be careful of hyperkalemia as had this in the past) and increased to 10/40 and back to normal at 13.7 in 9/19 but K up to 5.6.  K down to 4.3 in 3/20 and MA 25. MA up to 258 in 9/21 for unclear reasons but down to 85 in 3/22 and 59 in 1/23  - eye exam 11/19  - check Hgb A1c and cmp and microalbumin prior to next visit        2. Essential hypertension, benign: his BP was above goal < 140/90 so needs to check some home readings. Does have history of hyperaldosteronism that was surgically treated by left adrenalectomy in 3/12. His K was higher but his sugar was over 300 on his lab draw in 9/19 but back to normal in 3/20  - cont amlodipine 10 mg daily  - cont benazepril 40 mg daily  - cont hctz 12.5 mg daily  - monitor home blood pressure readings        3. Hypercholesterolemia: Given DM, Goal LDL < 100, non-HDL < 130, and TG < 150.  in 3/16. Up to 171 in 7/16 with non-compliance but down to 93 in 10/16 with compliance and 94 in 3/17 and 110 in 6/17 and 94 in 9/17 and 112 in 11/17. Up to 127 in 3/19 due to missing doses and 109 in 9/19 with better compliance. Up to 116 in 3/20 and down to 106 in 9/20 and 103 in 3/21. Up to 137 in 9/21 with missing doses due to pill size so stopped lipitor 80 and changed to crestor 20 which is a smaller pill and down to 92 in 3/22. Up to 146 in 1/23 due to running out for a week prior to lab draw.   - cont crestor 20 mg daily  - check lipids prior to next visit         Patient Instructions   1) Start monitoring blood pressure about 2-3 times per week at alternating times either in the morning or evening after resting for 5 minutes and sitting upright in a chair with your arm at heart level. Please let me know if you are having readings over 140 on the top number or 90 on the bottom number. 2) Your Hemoglobin A1c (3 month test of blood sugar) yovanny from 7.6% to 8.1% due to diet so I don't think we need to make any changes. 3) Your creatinine (kidney test) was slightly high due to mild dehydration. Drink at least 4 8oz glasses of water everyday to stay hydrated to prevent your creatinine level from going higher. 4) ALT and AST are markers of liver function. Your values are normal.    5) Your cholesterol yovanny due to being out of rosuvastatin prior to the lab draw so we'll check this again next time to ensure it's come back down. 6) Your urine protein has come down. Follow-up and Dispositions    Return in about 6 months (around 7/20/2023).                Copy sent to:  Laney Jacobs MD

## 2023-02-13 RX ORDER — AMLODIPINE BESYLATE 10 MG/1
TABLET ORAL
Qty: 90 TABLET | Refills: 3 | Status: SHIPPED | OUTPATIENT
Start: 2023-02-13

## 2023-02-13 RX ORDER — HYDROCHLOROTHIAZIDE 12.5 MG/1
TABLET ORAL
Qty: 90 TABLET | Refills: 3 | Status: SHIPPED | OUTPATIENT
Start: 2023-02-13

## 2023-02-13 RX ORDER — BENAZEPRIL HYDROCHLORIDE 40 MG/1
TABLET ORAL
Qty: 90 TABLET | Refills: 3 | Status: SHIPPED | OUTPATIENT
Start: 2023-02-13

## 2023-03-21 DIAGNOSIS — E11.21 TYPE 2 DIABETES WITH NEPHROPATHY (HCC): ICD-10-CM

## 2023-03-21 RX ORDER — FLASH GLUCOSE SENSOR
KIT MISCELLANEOUS
Qty: 2 KIT | Refills: 11 | Status: SHIPPED | OUTPATIENT
Start: 2023-03-21

## 2023-04-22 DIAGNOSIS — E11.21 TYPE 2 DIABETES WITH NEPHROPATHY (HCC): Primary | ICD-10-CM

## 2023-04-22 DIAGNOSIS — I10 ESSENTIAL HYPERTENSION, BENIGN: ICD-10-CM

## 2023-04-22 DIAGNOSIS — E78.00 HYPERCHOLESTEROLEMIA: ICD-10-CM

## 2023-04-23 DIAGNOSIS — I10 ESSENTIAL HYPERTENSION, BENIGN: ICD-10-CM

## 2023-04-23 DIAGNOSIS — E78.00 HYPERCHOLESTEROLEMIA: ICD-10-CM

## 2023-04-23 DIAGNOSIS — E11.21 TYPE 2 DIABETES WITH NEPHROPATHY (HCC): Primary | ICD-10-CM

## 2023-04-24 DIAGNOSIS — E11.21 TYPE 2 DIABETES WITH NEPHROPATHY (HCC): Primary | ICD-10-CM

## 2023-04-24 DIAGNOSIS — E78.00 HYPERCHOLESTEROLEMIA: ICD-10-CM

## 2023-04-24 DIAGNOSIS — I10 ESSENTIAL HYPERTENSION, BENIGN: ICD-10-CM

## 2023-07-04 DIAGNOSIS — I10 ESSENTIAL HYPERTENSION, BENIGN: ICD-10-CM

## 2023-07-04 DIAGNOSIS — E78.00 HYPERCHOLESTEROLEMIA: ICD-10-CM

## 2023-07-04 DIAGNOSIS — E11.21 TYPE 2 DIABETES WITH NEPHROPATHY (HCC): ICD-10-CM

## 2023-07-18 LAB
EST. AVERAGE GLUCOSE BLD GHB EST-MCNC: 174 MG/DL
HBA1C MFR BLD: 7.7 % (ref 4.8–5.6)

## 2023-07-19 LAB
BUN SERPL-MCNC: 20 MG/DL (ref 8–27)
BUN/CREAT SERPL: 16 (ref 10–24)
CALCIUM SERPL-MCNC: 9.7 MG/DL (ref 8.6–10.2)
CHLORIDE SERPL-SCNC: 107 MMOL/L (ref 96–106)
CHOLEST SERPL-MCNC: 223 MG/DL (ref 100–199)
CO2 SERPL-SCNC: 22 MMOL/L (ref 20–29)
CREAT SERPL-MCNC: 1.28 MG/DL (ref 0.76–1.27)
EGFRCR SERPLBLD CKD-EPI 2021: 62 ML/MIN/1.73
GLUCOSE SERPL-MCNC: 178 MG/DL (ref 70–99)
HDLC SERPL-MCNC: 68 MG/DL
IMP & REVIEW OF LAB RESULTS: NORMAL
LDLC SERPL CALC-MCNC: 143 MG/DL (ref 0–99)
POTASSIUM SERPL-SCNC: 5.3 MMOL/L (ref 3.5–5.2)
SODIUM SERPL-SCNC: 143 MMOL/L (ref 134–144)
TRIGL SERPL-MCNC: 68 MG/DL (ref 0–149)
VLDLC SERPL CALC-MCNC: 12 MG/DL (ref 5–40)

## 2023-07-21 ENCOUNTER — OFFICE VISIT (OUTPATIENT)
Age: 63
End: 2023-07-21
Payer: COMMERCIAL

## 2023-07-21 VITALS
SYSTOLIC BLOOD PRESSURE: 164 MMHG | BODY MASS INDEX: 27.4 KG/M2 | HEART RATE: 60 BPM | DIASTOLIC BLOOD PRESSURE: 80 MMHG | WEIGHT: 185 LBS | HEIGHT: 69 IN

## 2023-07-21 DIAGNOSIS — Z79.4 TYPE 2 DIABETES MELLITUS WITH DIABETIC NEPHROPATHY, WITH LONG-TERM CURRENT USE OF INSULIN (HCC): Primary | ICD-10-CM

## 2023-07-21 DIAGNOSIS — I10 ESSENTIAL (PRIMARY) HYPERTENSION: ICD-10-CM

## 2023-07-21 DIAGNOSIS — E78.00 PURE HYPERCHOLESTEROLEMIA, UNSPECIFIED: ICD-10-CM

## 2023-07-21 DIAGNOSIS — E11.21 TYPE 2 DIABETES MELLITUS WITH DIABETIC NEPHROPATHY, WITH LONG-TERM CURRENT USE OF INSULIN (HCC): Primary | ICD-10-CM

## 2023-07-21 PROCEDURE — 3051F HG A1C>EQUAL 7.0%<8.0%: CPT | Performed by: INTERNAL MEDICINE

## 2023-07-21 PROCEDURE — 99214 OFFICE O/P EST MOD 30 MIN: CPT | Performed by: INTERNAL MEDICINE

## 2023-07-21 PROCEDURE — 3079F DIAST BP 80-89 MM HG: CPT | Performed by: INTERNAL MEDICINE

## 2023-07-21 PROCEDURE — 3077F SYST BP >= 140 MM HG: CPT | Performed by: INTERNAL MEDICINE

## 2023-07-21 RX ORDER — HYDROCHLOROTHIAZIDE 25 MG/1
TABLET ORAL
Qty: 90 TABLET | Refills: 3 | Status: SHIPPED | OUTPATIENT
Start: 2023-07-21

## 2023-07-21 NOTE — PROGRESS NOTES
Chief Complaint   Patient presents with    Diabetes     PCP and pharmacy confirmed      History of Present Illness: Jose Miguel Cardenas is a 57 y.o. male here for follow up of diabetes. Weight down 2 lbs since last visit in 1/23. Just had left eye glaucoma surgery last month and vision is starting to improve. He hasn't been as careful with his diet and eating more fried foods but states he has not been out of the crestor and only rarely misses a dose. Compliant with BP regimen and home readings are in the 130-140s but does have a few higher. Sometimes doesn't always take the full amount of insulin and then can eat more carbs and this can lead to a spike in his readings. I reviewed his [address] and he has many good readings but also a fair bit of spikes for this reason. Current Outpatient Medications   Medication Sig    amLODIPine (NORVASC) 10 MG tablet TAKE 1 TABLET DAILY    benazepril (LOTENSIN) 40 MG tablet TAKE 1 TABLET DAILY    hydroCHLOROthiazide (HYDRODIURIL) 12.5 MG tablet TAKE 1 TABLET DAILY FOR BLOOD PRESSURE    insulin lispro protamine & lispro (HUMALOG MIX) (75-25) 100 UNIT per ML SUSP injection vial Based on SSI    rosuvastatin (CRESTOR) 20 MG tablet TAKE 1 TABLET DAILY FOR CHOLESTEROL (REPLACES ATORVASTATIN)     No current facility-administered medications for this visit. Allergies   Allergen Reactions    Benazepril Other (See Comments)     Hyperkalemia ( patient is currently taking this medication)     Review of Systems: PER HPI    Physical Examination:  Blood pressure (!) 164/80, pulse 60, height 5' 9\" (1.753 m), weight 185 lb (83.9 kg).   General: pleasant, no distress, good eye contact   Neck: no carotid bruits  Cardiovascular: regular, normal rate, nl s1 and s2, no m/r/g,   Respiratory: clear bilaterally  Integumentary: no edema,   Psychiatric: normal mood and affect    Diabetic foot exam:   Left Foot:   Visual Exam: callous- mild   Pulse DP: 2+ (normal)   Filament test: reduced

## 2023-07-21 NOTE — PATIENT INSTRUCTIONS
1) Your Hemoglobin A1c (3 month test of blood sugar) is back down under 8% so we'll keep your insulin regimen the same. 2) Your cholesterol was similar to last time despite getting the rosuvastatin (crestor) more regularly so let's work on the diet. Try to limit the amount of fried foods, fatty foods, butter, gravy, red meat, ice cream, cheese and eggs in your diet, which are all high in cholesterol. 3) Your creatinine (kidney test) was better than last time. 4) Take 2 of the 12.5 mg tabs of hydrochlorothiazide (hctz) daily until these run out and then take 1 of the 25 mg tabs once daily. This will be ready for  at the pharmacy today.

## 2023-07-25 RX ORDER — INSULIN LISPRO 100 [IU]/ML
INJECTION, SUSPENSION SUBCUTANEOUS
Qty: 90 ML | Refills: 3 | Status: SHIPPED | OUTPATIENT
Start: 2023-07-25

## 2023-10-30 RX ORDER — PEN NEEDLE, DIABETIC 29 G X1/2"
NEEDLE, DISPOSABLE MISCELLANEOUS
Qty: 300 EACH | Refills: 3 | Status: SHIPPED | OUTPATIENT
Start: 2023-10-30

## 2024-01-05 RX ORDER — FLASH GLUCOSE SENSOR
KIT MISCELLANEOUS
Qty: 6 EACH | Refills: 3 | Status: SHIPPED | OUTPATIENT
Start: 2024-01-05

## 2024-01-07 DIAGNOSIS — I10 ESSENTIAL (PRIMARY) HYPERTENSION: ICD-10-CM

## 2024-01-07 DIAGNOSIS — E11.21 TYPE 2 DIABETES MELLITUS WITH DIABETIC NEPHROPATHY, WITH LONG-TERM CURRENT USE OF INSULIN (HCC): ICD-10-CM

## 2024-01-07 DIAGNOSIS — Z79.4 TYPE 2 DIABETES MELLITUS WITH DIABETIC NEPHROPATHY, WITH LONG-TERM CURRENT USE OF INSULIN (HCC): ICD-10-CM

## 2024-01-07 DIAGNOSIS — E78.00 PURE HYPERCHOLESTEROLEMIA, UNSPECIFIED: ICD-10-CM

## 2024-01-08 RX ORDER — ROSUVASTATIN CALCIUM 20 MG/1
TABLET, COATED ORAL
Qty: 90 TABLET | Refills: 3 | Status: SHIPPED | OUTPATIENT
Start: 2024-01-08

## 2024-01-16 LAB
ALBUMIN SERPL-MCNC: 4.2 G/DL (ref 3.9–4.9)
ALBUMIN/GLOB SERPL: 1.6 {RATIO} (ref 1.2–2.2)
ALP SERPL-CCNC: 94 IU/L (ref 44–121)
ALT SERPL-CCNC: 21 IU/L (ref 0–44)
AST SERPL-CCNC: 16 IU/L (ref 0–40)
BILIRUB SERPL-MCNC: 0.4 MG/DL (ref 0–1.2)
BUN SERPL-MCNC: 21 MG/DL (ref 8–27)
BUN/CREAT SERPL: 15 (ref 10–24)
CALCIUM SERPL-MCNC: 9.3 MG/DL (ref 8.6–10.2)
CHLORIDE SERPL-SCNC: 106 MMOL/L (ref 96–106)
CO2 SERPL-SCNC: 22 MMOL/L (ref 20–29)
CREAT SERPL-MCNC: 1.36 MG/DL (ref 0.76–1.27)
EGFRCR SERPLBLD CKD-EPI 2021: 58 ML/MIN/1.73
GLOBULIN SER CALC-MCNC: 2.6 G/DL (ref 1.5–4.5)
GLUCOSE SERPL-MCNC: 169 MG/DL (ref 70–99)
POTASSIUM SERPL-SCNC: 4.4 MMOL/L (ref 3.5–5.2)
PROT SERPL-MCNC: 6.8 G/DL (ref 6–8.5)
SODIUM SERPL-SCNC: 142 MMOL/L (ref 134–144)

## 2024-01-17 LAB
CHOLEST SERPL-MCNC: 221 MG/DL (ref 100–199)
EST. AVERAGE GLUCOSE BLD GHB EST-MCNC: 180 MG/DL
HBA1C MFR BLD: 7.9 % (ref 4.8–5.6)
HDLC SERPL-MCNC: 65 MG/DL
IMP & REVIEW OF LAB RESULTS: NORMAL
LDLC SERPL CALC-MCNC: 142 MG/DL (ref 0–99)
Lab: NORMAL
REPORT: NORMAL
REPORT: NORMAL
TRIGL SERPL-MCNC: 81 MG/DL (ref 0–149)
VLDLC SERPL CALC-MCNC: 14 MG/DL (ref 5–40)

## 2024-01-22 ENCOUNTER — OFFICE VISIT (OUTPATIENT)
Age: 64
End: 2024-01-22
Payer: COMMERCIAL

## 2024-01-22 VITALS
HEART RATE: 60 BPM | SYSTOLIC BLOOD PRESSURE: 174 MMHG | DIASTOLIC BLOOD PRESSURE: 79 MMHG | BODY MASS INDEX: 27.88 KG/M2 | HEIGHT: 69 IN | WEIGHT: 188.2 LBS

## 2024-01-22 DIAGNOSIS — I10 ESSENTIAL (PRIMARY) HYPERTENSION: ICD-10-CM

## 2024-01-22 DIAGNOSIS — Z79.4 TYPE 2 DIABETES MELLITUS WITH DIABETIC NEPHROPATHY, WITH LONG-TERM CURRENT USE OF INSULIN (HCC): Primary | ICD-10-CM

## 2024-01-22 DIAGNOSIS — E78.00 PURE HYPERCHOLESTEROLEMIA, UNSPECIFIED: ICD-10-CM

## 2024-01-22 DIAGNOSIS — E11.21 TYPE 2 DIABETES MELLITUS WITH DIABETIC NEPHROPATHY, WITH LONG-TERM CURRENT USE OF INSULIN (HCC): Primary | ICD-10-CM

## 2024-01-22 PROCEDURE — 3077F SYST BP >= 140 MM HG: CPT | Performed by: INTERNAL MEDICINE

## 2024-01-22 PROCEDURE — 99214 OFFICE O/P EST MOD 30 MIN: CPT | Performed by: INTERNAL MEDICINE

## 2024-01-22 PROCEDURE — 3051F HG A1C>EQUAL 7.0%<8.0%: CPT | Performed by: INTERNAL MEDICINE

## 2024-01-22 PROCEDURE — 1123F ACP DISCUSS/DSCN MKR DOCD: CPT | Performed by: INTERNAL MEDICINE

## 2024-01-22 PROCEDURE — 3078F DIAST BP <80 MM HG: CPT | Performed by: INTERNAL MEDICINE

## 2024-01-22 RX ORDER — BENAZEPRIL HYDROCHLORIDE 40 MG/1
40 TABLET ORAL DAILY
Qty: 90 TABLET | Refills: 3 | Status: SHIPPED | OUTPATIENT
Start: 2024-01-22

## 2024-01-22 RX ORDER — AMLODIPINE BESYLATE 10 MG/1
10 TABLET ORAL DAILY
Qty: 90 TABLET | Refills: 3 | Status: SHIPPED | OUTPATIENT
Start: 2024-01-22

## 2024-01-22 RX ORDER — HYDROCHLOROTHIAZIDE 25 MG/1
TABLET ORAL
Qty: 90 TABLET | Refills: 3 | Status: SHIPPED | OUTPATIENT
Start: 2024-01-22

## 2024-01-22 NOTE — PATIENT INSTRUCTIONS
1) Your LDL (bad cholesterol) is 142 and goal is under 100 possibly due to diet and taking the rosuvastatin in the morning.  Move this pill to bedtime.  Try to limit the amount of fried foods, fatty foods, butter, gravy, red meat, ice cream, cheese and eggs in your diet, which are all high in cholesterol.    2) BUN and creatinine are markers of kidney function.  Your creatinine was 1.36 which was slightly high but down from 1.4 in the past.    3) ALT and AST are markers of liver function.  Your values are normal.    4) Your Hemoglobin A1c (3 month test of blood sugar) is 7.9% and is at goal under 8%.

## 2024-01-22 NOTE — PROGRESS NOTES
Chief Complaint   Patient presents with    Diabetes     PCP and pharmacy confirmed      History of Present Illness: Fuentes Stubbs is a 63 y.o. male here for follow up of diabetes.  Weight up 3 lbs since last visit in 7/23.  States he has been taking crestor 20 mg every morning and hasn't missed any doses.  Willing to move to bedtime to see if this better lowers his cholesterol.  Does admit to eating eggs and fried foods over the past month.  Has been taking the higher dose of hctz along with amlodipine and benazepril and checked his BP today before coming and it was 136/60.  Compliant with his insulin regimen and is able to keep his blood sugars in the 100-180 range as long as he is not off track with his diet.  Still using bandar 14 day sensor.        Current Outpatient Medications   Medication Sig    rosuvastatin (CRESTOR) 20 MG tablet TAKE 1 TABLET DAILY FOR CHOLESTEROL (REPLACES ATORVASTATIN)    Continuous Blood Gluc Sensor (FREESTYLE BANDAR 14 DAY SENSOR) MISC Change every 14 days as directed    B-D INS SYR ULTRAFINE 1CC/30G 30G X 1/2\" 1 ML MISC USE TO INJECT THREE TIMES A DAY    HUMALOG MIX 75/25 (75-25) 100 UNIT/ML SUSP injection vial USE AS INSTRUCTED BY YOUR PRESCRIBER    hydroCHLOROthiazide (HYDRODIURIL) 25 MG tablet TAKE 1 TABLET DAILY FOR BLOOD PRESSURE--replaces the 12.5 mg tabs    amLODIPine (NORVASC) 10 MG tablet TAKE 1 TABLET DAILY    benazepril (LOTENSIN) 40 MG tablet TAKE 1 TABLET DAILY     No current facility-administered medications for this visit.     Allergies   Allergen Reactions    Benazepril Other (See Comments)     Hyperkalemia ( patient is currently taking this medication)       Review of Systems: PER HPI    Physical Examination:  Blood pressure (!) 174/79, pulse 60, height 1.753 m (5' 9\"), weight 85.4 kg (188 lb 3.2 oz).  General: pleasant, no distress, good eye contact   Neck: no carotid bruits  Cardiovascular: regular, normal rate, nl s1 and s2, no m/r/g,   Respiratory: clear

## 2024-02-17 DIAGNOSIS — E11.21 TYPE 2 DIABETES MELLITUS WITH DIABETIC NEPHROPATHY (HCC): ICD-10-CM

## 2024-02-17 RX ORDER — FLASH GLUCOSE SENSOR
KIT MISCELLANEOUS
Qty: 6 EACH | Refills: 3 | Status: SHIPPED | OUTPATIENT
Start: 2024-02-17

## 2024-04-16 ENCOUNTER — OFFICE VISIT (OUTPATIENT)
Age: 64
End: 2024-04-16

## 2024-04-16 VITALS
OXYGEN SATURATION: 98 % | RESPIRATION RATE: 16 BRPM | HEART RATE: 50 BPM | BODY MASS INDEX: 27.85 KG/M2 | HEIGHT: 69 IN | DIASTOLIC BLOOD PRESSURE: 57 MMHG | WEIGHT: 188 LBS | TEMPERATURE: 98 F | SYSTOLIC BLOOD PRESSURE: 114 MMHG

## 2024-04-16 DIAGNOSIS — Z23 ENCOUNTER FOR IMMUNIZATION: ICD-10-CM

## 2024-04-16 DIAGNOSIS — Z00.00 ANNUAL PHYSICAL EXAM: Primary | ICD-10-CM

## 2024-04-16 DIAGNOSIS — E11.21 TYPE 2 DIABETES WITH NEPHROPATHY (HCC): ICD-10-CM

## 2024-04-16 DIAGNOSIS — N52.9 ERECTILE DYSFUNCTION, UNSPECIFIED ERECTILE DYSFUNCTION TYPE: ICD-10-CM

## 2024-04-16 DIAGNOSIS — Z12.5 PROSTATE CANCER SCREENING: ICD-10-CM

## 2024-04-16 RX ORDER — SILDENAFIL 100 MG/1
100 TABLET, FILM COATED ORAL DAILY PRN
Qty: 10 TABLET | Refills: 0 | Status: SHIPPED | OUTPATIENT
Start: 2024-04-16

## 2024-04-16 RX ORDER — BRIMONIDINE TARTRATE AND TIMOLOL MALEATE 2; 5 MG/ML; MG/ML
1 SOLUTION OPHTHALMIC EVERY 12 HOURS
COMMUNITY

## 2024-04-16 RX ORDER — FLASH GLUCOSE SCANNING READER
EACH MISCELLANEOUS
COMMUNITY
Start: 2020-11-02

## 2024-04-16 RX ORDER — PEN NEEDLE, DIABETIC 29 G X1/2"
NEEDLE, DISPOSABLE MISCELLANEOUS
COMMUNITY
Start: 2022-11-03

## 2024-04-16 RX ORDER — LATANOPROST 50 UG/ML
2 SOLUTION/ DROPS OPHTHALMIC NIGHTLY
COMMUNITY
Start: 2022-07-11

## 2024-04-16 RX ORDER — FLASH GLUCOSE SENSOR
KIT MISCELLANEOUS
COMMUNITY
Start: 2023-03-21

## 2024-04-16 SDOH — ECONOMIC STABILITY: HOUSING INSECURITY
IN THE LAST 12 MONTHS, WAS THERE A TIME WHEN YOU DID NOT HAVE A STEADY PLACE TO SLEEP OR SLEPT IN A SHELTER (INCLUDING NOW)?: NO

## 2024-04-16 SDOH — ECONOMIC STABILITY: FOOD INSECURITY: WITHIN THE PAST 12 MONTHS, THE FOOD YOU BOUGHT JUST DIDN'T LAST AND YOU DIDN'T HAVE MONEY TO GET MORE.: NEVER TRUE

## 2024-04-16 SDOH — ECONOMIC STABILITY: FOOD INSECURITY: WITHIN THE PAST 12 MONTHS, YOU WORRIED THAT YOUR FOOD WOULD RUN OUT BEFORE YOU GOT MONEY TO BUY MORE.: NEVER TRUE

## 2024-04-16 SDOH — ECONOMIC STABILITY: INCOME INSECURITY: HOW HARD IS IT FOR YOU TO PAY FOR THE VERY BASICS LIKE FOOD, HOUSING, MEDICAL CARE, AND HEATING?: NOT VERY HARD

## 2024-04-16 ASSESSMENT — PATIENT HEALTH QUESTIONNAIRE - PHQ9
SUM OF ALL RESPONSES TO PHQ QUESTIONS 1-9: 0
1. LITTLE INTEREST OR PLEASURE IN DOING THINGS: NOT AT ALL
SUM OF ALL RESPONSES TO PHQ QUESTIONS 1-9: 0
SUM OF ALL RESPONSES TO PHQ9 QUESTIONS 1 & 2: 0
2. FEELING DOWN, DEPRESSED OR HOPELESS: NOT AT ALL
SUM OF ALL RESPONSES TO PHQ QUESTIONS 1-9: 0
SUM OF ALL RESPONSES TO PHQ QUESTIONS 1-9: 0

## 2024-04-16 NOTE — PROGRESS NOTES
Chief Complaint   Patient presents with    Annual Exam         1. \"Have you been to the ER, urgent care clinic since your last visit?  Hospitalized since your last visit?\" no    2. \"Have you seen or consulted any other health care providers outside of the [facility] since your last visit?\" ENDO-DR. HENSLEY AND  ORTHO-DR. NEHA ANGELES AND DR. AB DUFFY    3. For patients aged 45-75: Has the patient had a colonoscopy / FIT/ Cologuard? UNKNOWN      If the patient is female:    4. For patients aged 40-74: Has the patient had a mammogram within the past 2 years? N/A      5. For patients aged 21-65: Has the patient had a pap smear? N/A      Health Maintenance Due   Topic Date Due    Pneumococcal 65+ years Vaccine (1 of 2 - PCV) 11/20/1966    Depression Screen  Never done    HIV screen  Never done    DTaP/Tdap/Td vaccine (1 - Tdap) Never done    Shingles vaccine (1 of 2) Never done    Respiratory Syncytial Virus (RSV) Pregnant or age 60 yrs+ (1 - 1-dose 60+ series) Never done    COVID-19 Vaccine (2 - 2023-24 season) 09/01/2023    Diabetic Alb to Cr ratio (uACR) test  01/16/2024      Order placed for Prevnar 20 IM right deltoid and Shingrix #1 IM left deltoidfrom provider Dr. Jones , verified by Verbal Order Read Back with provider.

## 2024-04-16 NOTE — PROGRESS NOTES
Fuentes Stubbs (:  1960) is a 63 y.o. male,Established patient, here for evaluation of the following chief complaint(s):  Annual Exam         ASSESSMENT/PLAN:  1. Annual physical exam  2. Erectile dysfunction, unspecified erectile dysfunction type  -     sildenafil (VIAGRA) 100 MG tablet; Take 1 tablet by mouth daily as needed for Erectile Dysfunction, Disp-10 tablet, R-0Normal  -     Testosterone Total Only, Male; Future  -     Iron and TIBC; Future  -     T4, Free; Future  -     TSH; Future  3. Prostate cancer screening  -     PSA Screening; Future  4. Type 2 diabetes with nephropathy (HCC)  -     Urinalysis with Microscopic; Future  5. Encounter for immunization  -     Zoster, SHINGRIX, (18 yrs +), IM  -     Pneumococcal, PCV20, PREVNAR 20, (age 6w+), IM, PF      No follow-ups on file.         Subjective   SUBJECTIVE/OBJECTIVE:  HPI In for checkup. Still working at [employer]. Had surgery on L eye for glaucoma. Has had 4 surgeries on this eye in the last 4 months. Still has some blurred vision in this eye. Can't read with this eye. Needs blood pressure, diabetes and cholesterol checked. Needs meds for ED. Has tried cialis in the past and sildenafil in the past. They used to help, not so much anymore. Had a colonosccpy in  (Dr. Jones) told to come back in 5 years needs prevnar 20. Has diabetes and cholesterol followed by Dr. Martinez    Review of Systems       Objective   Physical Exam  Cardiovascular:      Rate and Rhythm: Normal rate and regular rhythm.      Heart sounds: Normal heart sounds. No murmur heard.  Pulmonary:      Effort: Pulmonary effort is normal.      Breath sounds: Normal breath sounds.   Abdominal:      General: Abdomen is flat. Bowel sounds are normal.      Palpations: Abdomen is soft.   Musculoskeletal:      Right lower leg: No edema.      Left lower leg: No edema.                  An electronic signature was used to authenticate this note.    --Carson Jones MD

## 2024-04-17 ENCOUNTER — TELEPHONE (OUTPATIENT)
Age: 64
End: 2024-04-17

## 2024-04-17 LAB — SPECIMEN STATUS REPORT: NORMAL

## 2024-04-17 NOTE — TELEPHONE ENCOUNTER
Bailee Crook [facility] Microbiology Customer Service [phone] ext [phone] Specimen #[id number] was sent in grey top urine container. Please call if Urine Culture needs to be added.

## 2024-04-18 LAB
APPEARANCE UR: CLEAR
BACTERIA #/AREA URNS HPF: NORMAL /[HPF]
BILIRUB UR QL STRIP: NEGATIVE
CASTS URNS QL MICRO: NORMAL /LPF
COLOR UR: YELLOW
EPI CELLS #/AREA URNS HPF: NORMAL /HPF (ref 0–10)
GLUCOSE UR QL STRIP: NEGATIVE
HGB UR QL STRIP: NEGATIVE
IRON SATN MFR SERPL: 38 % (ref 15–55)
IRON SERPL-MCNC: 95 UG/DL (ref 38–169)
KETONES UR QL STRIP: NEGATIVE
LEUKOCYTE ESTERASE UR QL STRIP: NEGATIVE
MICRO URNS: NORMAL
MICRO URNS: NORMAL
NITRITE UR QL STRIP: NEGATIVE
PH UR STRIP: 5 [PH] (ref 5–7.5)
PROT UR QL STRIP: NEGATIVE
PSA SERPL-MCNC: 2.3 NG/ML (ref 0–4)
RBC #/AREA URNS HPF: NORMAL /HPF (ref 0–2)
SP GR UR STRIP: 1.02 (ref 1–1.03)
T4 FREE SERPL-MCNC: 1 NG/DL (ref 0.82–1.77)
TIBC SERPL-MCNC: 249 UG/DL (ref 250–450)
TSH SERPL DL<=0.005 MIU/L-ACNC: 0.95 UIU/ML (ref 0.45–4.5)
UIBC SERPL-MCNC: 154 UG/DL (ref 111–343)
UROBILINOGEN UR STRIP-MCNC: 0.2 MG/DL (ref 0.2–1)
WBC #/AREA URNS HPF: NORMAL /HPF (ref 0–5)

## 2024-04-20 LAB — BACTERIA UR CULT: NORMAL

## 2024-04-26 ENCOUNTER — TELEPHONE (OUTPATIENT)
Age: 64
End: 2024-04-26

## 2024-05-01 ENCOUNTER — TELEPHONE (OUTPATIENT)
Age: 64
End: 2024-05-01

## 2024-05-01 DIAGNOSIS — N52.9 ERECTILE DYSFUNCTION, UNSPECIFIED ERECTILE DYSFUNCTION TYPE: Primary | ICD-10-CM

## 2024-05-01 NOTE — TELEPHONE ENCOUNTER
Message left on patient voice mail requesting a return call regarding message.  Patient needs to repeat testosterone level due to issue with lab test order number.   Testosterone test ordered and futured.  Verbal order given by Dr. Jones.

## 2024-05-03 ENCOUNTER — OFFICE VISIT (OUTPATIENT)
Age: 64
End: 2024-05-03

## 2024-05-03 DIAGNOSIS — N52.9 ERECTILE DYSFUNCTION, UNSPECIFIED ERECTILE DYSFUNCTION TYPE: Primary | ICD-10-CM

## 2024-05-03 NOTE — PROGRESS NOTES
Chief Complaint   Patient presents with    Labs Only     The patient, Fuentes Glasgow, identity was verified by name and .

## 2024-05-09 LAB
TESTOST FREE SERPL-MCNC: 3.4 PG/ML (ref 6.6–18.1)
TESTOST SERPL-MCNC: 191 NG/DL (ref 264–916)

## 2024-05-10 LAB — SPECIMEN STATUS REPORT: NORMAL

## 2024-05-13 ENCOUNTER — OFFICE VISIT (OUTPATIENT)
Age: 64
End: 2024-05-13

## 2024-05-13 DIAGNOSIS — N52.9 ERECTILE DYSFUNCTION, UNSPECIFIED ERECTILE DYSFUNCTION TYPE: Primary | ICD-10-CM

## 2024-05-18 LAB — TESTOST FREE SERPL-MCNC: 3 PG/ML (ref 6.6–18.1)

## 2024-05-20 ENCOUNTER — TELEPHONE (OUTPATIENT)
Age: 64
End: 2024-05-20

## 2024-05-20 DIAGNOSIS — E34.9 HYPOTESTOSTERONEMIA: Primary | ICD-10-CM

## 2024-05-20 RX ORDER — TESTOSTERONE GEL, 1% 10 MG/G
50 GEL TRANSDERMAL DAILY
Qty: 5 G | Refills: 1 | Status: SHIPPED | OUTPATIENT
Start: 2024-05-20 | End: 2024-12-06

## 2024-05-21 ENCOUNTER — CLINICAL DOCUMENTATION (OUTPATIENT)
Age: 64
End: 2024-05-21

## 2024-07-01 DIAGNOSIS — E78.00 PURE HYPERCHOLESTEROLEMIA, UNSPECIFIED: ICD-10-CM

## 2024-07-01 DIAGNOSIS — Z79.4 TYPE 2 DIABETES MELLITUS WITH DIABETIC NEPHROPATHY, WITH LONG-TERM CURRENT USE OF INSULIN (HCC): ICD-10-CM

## 2024-07-01 DIAGNOSIS — E11.21 TYPE 2 DIABETES MELLITUS WITH DIABETIC NEPHROPATHY, WITH LONG-TERM CURRENT USE OF INSULIN (HCC): ICD-10-CM

## 2024-07-01 DIAGNOSIS — I10 ESSENTIAL (PRIMARY) HYPERTENSION: ICD-10-CM

## 2024-07-16 ENCOUNTER — OFFICE VISIT (OUTPATIENT)
Age: 64
End: 2024-07-16
Payer: COMMERCIAL

## 2024-07-16 VITALS
HEART RATE: 84 BPM | SYSTOLIC BLOOD PRESSURE: 148 MMHG | WEIGHT: 190 LBS | DIASTOLIC BLOOD PRESSURE: 70 MMHG | RESPIRATION RATE: 18 BRPM | BODY MASS INDEX: 28.14 KG/M2 | HEIGHT: 69 IN

## 2024-07-16 DIAGNOSIS — I10 ESSENTIAL (PRIMARY) HYPERTENSION: ICD-10-CM

## 2024-07-16 DIAGNOSIS — Z79.4 TYPE 2 DIABETES MELLITUS WITH DIABETIC NEPHROPATHY, WITH LONG-TERM CURRENT USE OF INSULIN (HCC): Primary | ICD-10-CM

## 2024-07-16 DIAGNOSIS — E78.00 PURE HYPERCHOLESTEROLEMIA, UNSPECIFIED: ICD-10-CM

## 2024-07-16 DIAGNOSIS — E11.21 TYPE 2 DIABETES MELLITUS WITH DIABETIC NEPHROPATHY, WITH LONG-TERM CURRENT USE OF INSULIN (HCC): Primary | ICD-10-CM

## 2024-07-16 LAB — HBA1C MFR BLD: 8.1 %

## 2024-07-16 PROCEDURE — 83036 HEMOGLOBIN GLYCOSYLATED A1C: CPT | Performed by: INTERNAL MEDICINE

## 2024-07-16 PROCEDURE — 3051F HG A1C>EQUAL 7.0%<8.0%: CPT | Performed by: INTERNAL MEDICINE

## 2024-07-16 PROCEDURE — 3077F SYST BP >= 140 MM HG: CPT | Performed by: INTERNAL MEDICINE

## 2024-07-16 PROCEDURE — 99214 OFFICE O/P EST MOD 30 MIN: CPT | Performed by: INTERNAL MEDICINE

## 2024-07-16 PROCEDURE — 3078F DIAST BP <80 MM HG: CPT | Performed by: INTERNAL MEDICINE

## 2024-07-16 PROCEDURE — 1123F ACP DISCUSS/DSCN MKR DOCD: CPT | Performed by: INTERNAL MEDICINE

## 2024-07-16 PROCEDURE — 95251 CONT GLUC MNTR ANALYSIS I&R: CPT | Performed by: INTERNAL MEDICINE

## 2024-07-16 RX ORDER — DORZOLAMIDE HCL 20 MG/ML
SOLUTION/ DROPS OPHTHALMIC
COMMUNITY
Start: 2024-05-17

## 2024-07-16 RX ORDER — ATROPINE SULFATE 10 MG/ML
SOLUTION/ DROPS OPHTHALMIC
COMMUNITY
Start: 2024-05-04

## 2024-07-16 NOTE — PATIENT INSTRUCTIONS
1) Your Hemoglobin A1c (3 month test of blood sugar) was 8.1% up from 7.9% at last check and we want to work to get this closer to 7% or less.    2) Your biggest spike is after lunch so I would draw up 14 units in a syringe and bring this to work to help with compliance with this meal to prevent this spike.    3) Your blood pressure was up a little today but was fine at your PCP's office in 4/24 so we won't make any changes.

## 2024-07-16 NOTE — PROGRESS NOTES
Chief Complaint   Patient presents with    Diabetes     History of Present Illness: Fuentes Stubbs is a 63 y.o. male here for follow up of diabetes.  Weight up 2 lbs last visit in 1/24.  Has moved the crestor to bedtime and may rarely miss a dose.  Still using bandar 14 day and review of his data over the past 90 days shows 29% time in range, 19% high, 48% very high and 4% low with the majority of his spikes occurring after lunch as he often forgets to take a shot before this meal so he will start drawing up a syringe ahead of time to take to work.  BP up today but at goal at PCP's office in 4/24.                    Current Outpatient Medications   Medication Sig    atropine 1 % ophthalmic solution 1 DROP LEFT EYE TWICE A DAY 10 DAYS    dorzolamide (TRUSOPT) 2 % ophthalmic solution INSTILL ONE DROP INTO BOTH EYES OPHTHALMIC TWICE A DAY    testosterone (ANDROGEL; TESTIM) 50 MG/5GM (1%) GEL 1% gel Apply 0.05 g topically daily for 200 days. Max Daily Amount: 0.05 g    latanoprost (XALATAN) 0.005 % ophthalmic solution Place 2 drops into both eyes nightly    brimonidine-timolol (COMBIGAN) 0.2-0.5 % ophthalmic solution Place 1 drop into the right eye in the morning and 1 drop in the evening.    Continuous Blood Gluc Sensor (FREESTYLE BANDAR 14 DAY SENSOR) MISC USE AS DIRECTED    Continuous Blood Gluc  (FREESTYLE BANDAR 14 DAY READER) EFE Use as directed    Insulin Syringe-Needle U-100 (BD INSULIN SYRINGE U/F) 30G X 1/2\" 0.3 ML MISC USE TO INJECT THREE TIMES A DAY    blood glucose test strips (ASCENSIA AUTODISC VI;ONE TOUCH ULTRA TEST VI) strip daily    sildenafil (VIAGRA) 100 MG tablet Take 1 tablet by mouth daily as needed for Erectile Dysfunction    Continuous Blood Gluc Sensor (FREESTYLE BANDAR 14 DAY SENSOR) MISC USE AS DIRECTED    amLODIPine (NORVASC) 10 MG tablet Take 1 tablet by mouth daily    benazepril (LOTENSIN) 40 MG tablet Take 1 tablet by mouth daily    hydroCHLOROthiazide (HYDRODIURIL) 25 MG tablet TAKE

## 2024-07-16 NOTE — PROGRESS NOTES
Identified pt with two pt identifiers(name and ). Reviewed record in preparation for visit and have obtained necessary documentation. All patient medications has been reviewed.  Chief Complaint   Patient presents with    Diabetes             Wt Readings from Last 3 Encounters:   24 86.2 kg (190 lb)   24 85.3 kg (188 lb)   24 85.4 kg (188 lb 3.2 oz)     Temp Readings from Last 3 Encounters:   24 98 °F (36.7 °C) (Oral)     BP Readings from Last 3 Encounters:   24 (!) 154/80   24 (!) 114/57   24 (!) 174/79     Pulse Readings from Last 3 Encounters:   24 84   24 50   24 60       \"Have you been to the ER, urgent care clinic since your last visit?  Hospitalized since your last visit?\"    NO    “Have you seen or consulted any other health care providers outside of [facility] since your last visit?”    NO    Click Here for Release of Records Request

## 2024-08-09 ENCOUNTER — TELEPHONE (OUTPATIENT)
Age: 64
End: 2024-08-09

## 2024-08-09 NOTE — TELEPHONE ENCOUNTER
Lab order that was called in verbally on 4/16/24 will be faxed for signature so that [facility] can bill. Please contact Paula at [phone].

## 2024-08-12 NOTE — TELEPHONE ENCOUNTER
Paula at [facility] contacted unable to reach. Message left for a return call.

## 2024-08-13 ENCOUNTER — TELEPHONE (OUTPATIENT)
Age: 64
End: 2024-08-13

## 2024-08-13 NOTE — TELEPHONE ENCOUNTER
Paula from [facility] called stating that the nurse is needing to sign off on the pt's urine test. She is wanting to get a response today at the fax [phone]. Please give her a call back at [phone].

## 2024-08-14 ENCOUNTER — CLINICAL DOCUMENTATION (OUTPATIENT)
Age: 64
End: 2024-08-14

## 2024-08-19 ENCOUNTER — OFFICE VISIT (OUTPATIENT)
Age: 64
End: 2024-08-19
Payer: COMMERCIAL

## 2024-08-19 VITALS — BODY MASS INDEX: 28.11 KG/M2 | RESPIRATION RATE: 16 BRPM | WEIGHT: 189.8 LBS | HEIGHT: 69 IN

## 2024-08-19 DIAGNOSIS — E78.00 HYPERCHOLESTEROLEMIA: ICD-10-CM

## 2024-08-19 DIAGNOSIS — E34.9 HYPOTESTOSTERONEMIA: ICD-10-CM

## 2024-08-19 DIAGNOSIS — E11.21 TYPE 2 DIABETES WITH NEPHROPATHY (HCC): ICD-10-CM

## 2024-08-19 DIAGNOSIS — I10 ESSENTIAL HYPERTENSION, BENIGN: Primary | ICD-10-CM

## 2024-08-19 DIAGNOSIS — Z23 ENCOUNTER FOR IMMUNIZATION: ICD-10-CM

## 2024-08-19 DIAGNOSIS — Z12.11 COLON CANCER SCREENING: ICD-10-CM

## 2024-08-19 PROCEDURE — 90750 HZV VACC RECOMBINANT IM: CPT | Performed by: FAMILY MEDICINE

## 2024-08-19 PROCEDURE — 99214 OFFICE O/P EST MOD 30 MIN: CPT | Performed by: FAMILY MEDICINE

## 2024-08-19 PROCEDURE — 90471 IMMUNIZATION ADMIN: CPT | Performed by: FAMILY MEDICINE

## 2024-08-19 PROCEDURE — 1123F ACP DISCUSS/DSCN MKR DOCD: CPT | Performed by: FAMILY MEDICINE

## 2024-08-19 PROCEDURE — 3051F HG A1C>EQUAL 7.0%<8.0%: CPT | Performed by: FAMILY MEDICINE

## 2024-08-19 ASSESSMENT — ENCOUNTER SYMPTOMS
ABDOMINAL PAIN: 0
CHEST TIGHTNESS: 0
SHORTNESS OF BREATH: 0
BLOOD IN STOOL: 0

## 2024-08-19 NOTE — PROGRESS NOTES
No chief complaint on file.      \"Have you been to the ER, urgent care clinic since your last visit?  Hospitalized since your last visit?\"    NO    “Have you seen or consulted any other health care providers outside of [facility] since your last visit?”    [id number]. [email or web address].JOHNSON - A1C - 8.1            Click Here for Release of Records Request       There were no vitals filed for this visit.   Health Maintenance Due   Topic Date Due    HIV screen  Never done    DTaP/Tdap/Td vaccine (1 - Tdap) Never done    Respiratory Syncytial Virus (RSV) Pregnant or age 60 yrs+ (1 - 1-dose 60+ series) Never done    COVID-19 Vaccine (2 - - season) 2023    Diabetic Alb to Cr ratio (uACR) test  2024    Shingles vaccine (2 of 2) 2024    Flu vaccine (1) 2024    Diabetic foot exam  2024      VERBAL ORDER FROM DR. MARSH FOR SHINGRIX #2 IM RIGHT DELTOID.  The patient, Fuentes Stubbs, identity was verified by name and .

## 2024-08-19 NOTE — ASSESSMENT & PLAN NOTE
I recommended starting clonidine. However, pt declines another med at this point. He wants to check his bp at home 5 times a week for the next 6 weeks and bring his readings in. I think this is reasonable.     Orders:    CBC with Auto Differential; Future    Comprehensive Metabolic Panel; Future    CBC with Auto Differential    Comprehensive Metabolic Panel

## 2024-08-19 NOTE — PROGRESS NOTES
Fuentes Stubbs (:  1960) is a 63 y.o. male,Established patient, here for evaluation of the following chief complaint(s):  No chief complaint on file.         Assessment & Plan  Encounter for immunization       Orders:    Zoster, SHINGRIX, (18 yrs +), IM    Type 2 diabetes with nephropathy (HCC)            Essential hypertension, benign       Orders:    CBC with Auto Differential; Future    Comprehensive Metabolic Panel; Future    CBC with Auto Differential    Comprehensive Metabolic Panel    Hypercholesterolemia       Orders:    Lipid Panel; Future    Lipid Panel    Hypotestosteronemia       Orders:    Testosterone Total Only, Male; Future    Testosterone Total Only, Male    Colon cancer screening       Orders:    Mark Carbajal MD, Gastroenterology, [place]      No follow-ups on file.       Subjective   HPI In for checkup. Needs blood pressure and cholesterol checked. Still having some problems with ED. Sidenafil- no relief. Cialis- minimal improvement. No complaints of chest pain, shortness of breath, TIAs, claudication or edema.  Having some pains in both knees. Aleve- some relief. Still working at [employer]. Has also had a cortisone shot and synvisc injections- minimal improvement. Starting to consider a knee replacement. No night time pain.     Review of Systems   Constitutional:  Negative for fatigue and unexpected weight change.   HENT:  Negative for hearing loss.    Eyes:         Retinal and glaucoma surgery for eye disease.    Respiratory:  Negative for chest tightness and shortness of breath.         Nonsmoker   Cardiovascular:  Negative for chest pain and leg swelling.   Gastrointestinal:  Negative for abdominal pain and blood in stool.   Genitourinary:  Negative for frequency and hematuria.   Neurological:  Negative for syncope and weakness.   Psychiatric/Behavioral:  Negative for sleep disturbance. The patient is not nervous/anxious.           Objective   Physical

## 2024-08-20 LAB
ALBUMIN SERPL-MCNC: 4.3 G/DL (ref 3.9–4.9)
ALP SERPL-CCNC: 113 IU/L (ref 44–121)
ALT SERPL-CCNC: 18 IU/L (ref 0–44)
AST SERPL-CCNC: 18 IU/L (ref 0–40)
BASOPHILS # BLD AUTO: 0 X10E3/UL (ref 0–0.2)
BASOPHILS NFR BLD AUTO: 1 %
BILIRUB SERPL-MCNC: 0.3 MG/DL (ref 0–1.2)
BUN SERPL-MCNC: 32 MG/DL (ref 8–27)
BUN/CREAT SERPL: 22 (ref 10–24)
CALCIUM SERPL-MCNC: 9.1 MG/DL (ref 8.6–10.2)
CHLORIDE SERPL-SCNC: 107 MMOL/L (ref 96–106)
CHOLEST SERPL-MCNC: 198 MG/DL (ref 100–199)
CO2 SERPL-SCNC: 22 MMOL/L (ref 20–29)
CREAT SERPL-MCNC: 1.46 MG/DL (ref 0.76–1.27)
EGFRCR SERPLBLD CKD-EPI 2021: 53 ML/MIN/1.73
EOSINOPHIL # BLD AUTO: 0.1 X10E3/UL (ref 0–0.4)
EOSINOPHIL NFR BLD AUTO: 2 %
ERYTHROCYTE [DISTWIDTH] IN BLOOD BY AUTOMATED COUNT: 11 % (ref 11.6–15.4)
GLOBULIN SER CALC-MCNC: 2.6 G/DL (ref 1.5–4.5)
GLUCOSE SERPL-MCNC: 314 MG/DL (ref 70–99)
HCT VFR BLD AUTO: 37.1 % (ref 37.5–51)
HDLC SERPL-MCNC: 64 MG/DL
HGB BLD-MCNC: 11.9 G/DL (ref 13–17.7)
IMM GRANULOCYTES # BLD AUTO: 0 X10E3/UL (ref 0–0.1)
IMM GRANULOCYTES NFR BLD AUTO: 0 %
IMP & REVIEW OF LAB RESULTS: NORMAL
LDLC SERPL CALC-MCNC: 118 MG/DL (ref 0–99)
LYMPHOCYTES # BLD AUTO: 1.6 X10E3/UL (ref 0.7–3.1)
LYMPHOCYTES NFR BLD AUTO: 22 %
MCH RBC QN AUTO: 31.2 PG (ref 26.6–33)
MCHC RBC AUTO-ENTMCNC: 32.1 G/DL (ref 31.5–35.7)
MCV RBC AUTO: 97 FL (ref 79–97)
MONOCYTES # BLD AUTO: 0.5 X10E3/UL (ref 0.1–0.9)
MONOCYTES NFR BLD AUTO: 7 %
NEUTROPHILS # BLD AUTO: 4.8 X10E3/UL (ref 1.4–7)
NEUTROPHILS NFR BLD AUTO: 68 %
PLATELET # BLD AUTO: 255 X10E3/UL (ref 150–450)
POTASSIUM SERPL-SCNC: 5.3 MMOL/L (ref 3.5–5.2)
PROT SERPL-MCNC: 6.9 G/DL (ref 6–8.5)
RBC # BLD AUTO: 3.82 X10E6/UL (ref 4.14–5.8)
REPORT: NORMAL
REPORT: NORMAL
SODIUM SERPL-SCNC: 141 MMOL/L (ref 134–144)
SPECIMEN STATUS REPORT: NORMAL
TRIGL SERPL-MCNC: 92 MG/DL (ref 0–149)
VLDLC SERPL CALC-MCNC: 16 MG/DL (ref 5–40)
WBC # BLD AUTO: 7 X10E3/UL (ref 3.4–10.8)

## 2024-08-21 LAB — TESTOST SERPL-MCNC: 184 NG/DL (ref 264–916)

## 2024-08-22 ENCOUNTER — CLINICAL DOCUMENTATION (OUTPATIENT)
Age: 64
End: 2024-08-22

## 2024-08-28 DIAGNOSIS — E34.9 HYPOTESTOSTERONEMIA: ICD-10-CM

## 2024-08-28 RX ORDER — TESTOSTERONE GEL, 1% 10 MG/G
GEL TRANSDERMAL
Qty: 5 G | Refills: 2 | Status: SHIPPED | OUTPATIENT
Start: 2024-08-28 | End: 2025-01-24

## 2024-08-28 NOTE — PROGRESS NOTES
Pc with pt. Will increase androgel to 0.1 grams daily . K is up to 5.3- will keep an eye on this. Is on benazepril for microalbuminura, if K goes higher, may need to dc this med. Says that is taking rosuvastatin 20 mg daily. Continue this.

## 2024-08-29 ENCOUNTER — TELEPHONE (OUTPATIENT)
Age: 64
End: 2024-08-29

## 2024-08-29 NOTE — TELEPHONE ENCOUNTER
Per pharmacy, Testim 50mg/5g is tubes/packets. The pump is 1.62% and each pump delivers 20.25mg of Testosterone and pkg size is 75g or 150g.    Please clarify with the pharmacy.    [facility] [phone]        For Pharmacy Admin Tracking Only    Program: Medication Refill  CPA in place:    Recommendation Provided To:   Intervention Detail: New Rx: 1, reason: Improve Adherence  Intervention Accepted By:   Gap Closed?:    Time Spent (min): 5

## 2024-08-30 DIAGNOSIS — E34.9 HYPOTESTOSTERONEMIA: Primary | ICD-10-CM

## 2024-08-30 RX ORDER — TESTOSTERONE GEL, 1% 10 MG/G
GEL TRANSDERMAL
Qty: 60 EACH | Refills: 5
Start: 2024-08-30 | End: 2024-08-30

## 2024-08-30 NOTE — PROGRESS NOTES
Pc withlevel is subtherapeutic pharmacy- pt using packets. Not a pump. Increase to 2 packets a day as level is subtherapeutic

## 2024-10-14 ENCOUNTER — OFFICE VISIT (OUTPATIENT)
Age: 64
End: 2024-10-14
Payer: COMMERCIAL

## 2024-10-14 VITALS
TEMPERATURE: 98.2 F | HEART RATE: 67 BPM | OXYGEN SATURATION: 98 % | SYSTOLIC BLOOD PRESSURE: 139 MMHG | RESPIRATION RATE: 16 BRPM | DIASTOLIC BLOOD PRESSURE: 73 MMHG | BODY MASS INDEX: 27.55 KG/M2 | WEIGHT: 186 LBS | HEIGHT: 69 IN

## 2024-10-14 DIAGNOSIS — Z23 NEEDS FLU SHOT: ICD-10-CM

## 2024-10-14 DIAGNOSIS — Z23 ENCOUNTER FOR ADMINISTRATION OF COVID-19 VACCINE: ICD-10-CM

## 2024-10-14 DIAGNOSIS — E78.00 HYPERCHOLESTEROLEMIA: ICD-10-CM

## 2024-10-14 DIAGNOSIS — E34.9 HYPOTESTOSTERONEMIA: Primary | ICD-10-CM

## 2024-10-14 DIAGNOSIS — I10 WHITE COAT SYNDROME WITH DIAGNOSIS OF HYPERTENSION: ICD-10-CM

## 2024-10-14 PROCEDURE — 90471 IMMUNIZATION ADMIN: CPT | Performed by: FAMILY MEDICINE

## 2024-10-14 PROCEDURE — 1123F ACP DISCUSS/DSCN MKR DOCD: CPT | Performed by: FAMILY MEDICINE

## 2024-10-14 PROCEDURE — 99213 OFFICE O/P EST LOW 20 MIN: CPT | Performed by: FAMILY MEDICINE

## 2024-10-14 PROCEDURE — 90653 IIV ADJUVANT VACCINE IM: CPT | Performed by: FAMILY MEDICINE

## 2024-10-14 PROCEDURE — 91320 SARSCV2 VAC 30MCG TRS-SUC IM: CPT | Performed by: FAMILY MEDICINE

## 2024-10-14 PROCEDURE — 3075F SYST BP GE 130 - 139MM HG: CPT | Performed by: FAMILY MEDICINE

## 2024-10-14 PROCEDURE — 90480 ADMN SARSCOV2 VAC 1/ONLY CMP: CPT | Performed by: FAMILY MEDICINE

## 2024-10-14 PROCEDURE — 3078F DIAST BP <80 MM HG: CPT | Performed by: FAMILY MEDICINE

## 2024-10-14 NOTE — PROGRESS NOTES
Chief Complaint   Patient presents with    Follow-up    Hypertension    Cholesterol Problem       \"Have you been to the ER, urgent care clinic since your last visit?  Hospitalized since your last visit?\"    NO    “Have you seen or consulted any other health care providers outside of [facility] since your last visit?”      OPHTHALMOLOGY - DR ISMAEL PEREA            Click Here for Release of Records Request       Vitals:    10/14/24 1620   BP: 139/73   Pulse: 67   Resp: 16   Temp: 98.2 °F (36.8 °C)   SpO2: 98%      Health Maintenance Due   Topic Date Due    HIV screen  Never done    DTaP/Tdap/Td vaccine (1 - Tdap) Never done    Respiratory Syncytial Virus (RSV) Pregnant or age 60 yrs+ (1 - 1-dose 60+ series) Never done    Diabetic Alb to Cr ratio (uACR) test  2024    Diabetic foot exam  2024    Flu vaccine (1) 2024    COVID-19 Vaccine (2 -  season) 2024      VERBAL ORDER FROM DR. MARSH FLUAD LEFT DELTOID IM AND COVID RIGHT DELTOID IM.    The patient, Fuentes Stubbs, identity was verified by name and .

## 2024-10-14 NOTE — PROGRESS NOTES
Fuentes Stubbs (:  1960) is a 63 y.o. male,Established patient, here for evaluation of the following chief complaint(s):  Follow-up, Hypertension, and Cholesterol Problem         Assessment & Plan  Hypotestosteronemia   To increase testosterone to 2 pumps daily. Recheck for lab only in 1 month (testosterone level)    Orders:    Testosterone, free, total; Future    Testosterone, free, total    Hypercholesterolemia       Orders:    Lipid Panel; Future    Lipid Panel    Needs flu shot       Orders:    Influenza, FLUAD Trivalent, (age 65 y+), IM, Preservative Free, 0.5mL    Encounter for administration of COVID-19 vaccine       Orders:    COVID-19, COMIRNATY, (age 12y+), IM, PF, 30mcg/0.3mL    White coat syndrome with diagnosis of hypertension    I think bp is stable on current medication regimen. Continue this for now.            No follow-ups on file.       Subjective   HPI In for blood pressure check. Has been checking blod pressure at home. Average reading 140/75 (has about 50 readings). No complaints of chest pain, shortness of breath, TIAs, claudication or edema.  Followed by Dr. Martinez for diabetes.   Has also been using his testosterone cream once daily, often misses morning dose.   Hasn't seen any improvement in ED on the testosterone cream. Has only been taking the cream for 6 weeks. Has been doing one or 2 pumps a day, does one in am and one in pm. Actually pt says that he has only been doing 2 pumps a dayonce a week and one pump all the other days.     Review of Systems       Objective   Physical Exam  Cardiovascular:      Rate and Rhythm: Normal rate and regular rhythm.      Heart sounds: Normal heart sounds. No murmur heard.  Pulmonary:      Effort: Pulmonary effort is normal.      Breath sounds: Normal breath sounds.   Abdominal:      General: Abdomen is flat. Bowel sounds are normal.      Palpations: Abdomen is soft.   Musculoskeletal:      Right lower leg: No edema.      Left lower leg: No

## 2024-10-15 LAB
CHOLEST SERPL-MCNC: 199 MG/DL (ref 100–199)
HDLC SERPL-MCNC: 59 MG/DL
IMP & REVIEW OF LAB RESULTS: NORMAL
LDLC SERPL CALC-MCNC: 118 MG/DL (ref 0–99)
TRIGL SERPL-MCNC: 123 MG/DL (ref 0–149)
VLDLC SERPL CALC-MCNC: 22 MG/DL (ref 5–40)

## 2024-10-17 LAB
TESTOST FREE SERPL-MCNC: 5.9 PG/ML (ref 6.6–18.1)
TESTOST SERPL-MCNC: 327 NG/DL (ref 264–916)

## 2024-10-22 RX ORDER — ROSUVASTATIN CALCIUM 40 MG/1
40 TABLET, COATED ORAL DAILY
Qty: 90 TABLET | Refills: 3 | Status: SHIPPED | OUTPATIENT
Start: 2024-10-22

## 2024-10-23 RX ORDER — PEN NEEDLE, DIABETIC 29 G X1/2"
NEEDLE, DISPOSABLE MISCELLANEOUS
Qty: 300 EACH | Refills: 3 | Status: SHIPPED | OUTPATIENT
Start: 2024-10-23

## 2024-11-11 ENCOUNTER — OFFICE VISIT (OUTPATIENT)
Age: 64
End: 2024-11-11

## 2024-11-11 DIAGNOSIS — E34.9 HYPOTESTOSTERONEMIA: ICD-10-CM

## 2024-11-11 DIAGNOSIS — E34.9 HYPOTESTOSTERONEMIA: Primary | ICD-10-CM

## 2024-11-15 LAB
TESTOST FREE SERPL-MCNC: 11.6 PG/ML (ref 6.6–18.1)
TESTOST SERPL-MCNC: 590 NG/DL (ref 264–916)

## 2024-11-21 DIAGNOSIS — N52.9 ERECTILE DYSFUNCTION, UNSPECIFIED ERECTILE DYSFUNCTION TYPE: ICD-10-CM

## 2024-11-22 RX ORDER — SILDENAFIL 100 MG/1
100 TABLET, FILM COATED ORAL DAILY PRN
Qty: 8 TABLET | Refills: 1 | Status: SHIPPED | OUTPATIENT
Start: 2024-11-22

## 2024-11-22 NOTE — TELEPHONE ENCOUNTER
Last appointment: 10/24/24  Next appointment: none  Previous refill encounter(s): 4/16/24 #10    Requested Prescriptions     Pending Prescriptions Disp Refills    sildenafil (VIAGRA) 100 MG tablet [Pharmacy Med Name: SILDENAFIL 100 MG TABLET] 8 tablet 1     Sig: TAKE 1 TABLET BY MOUTH DAILY AS NEEDED FOR ERECTILE DYSFUNCTION.         For Pharmacy Admin Tracking Only    Program: Medication Refill  CPA in place:    Recommendation Provided To:   Intervention Detail: New Rx: 1, reason: Patient Preference  Intervention Accepted By:   Gap Closed?:    Time Spent (min): 5

## 2024-12-29 RX ORDER — FLASH GLUCOSE SENSOR
KIT MISCELLANEOUS
Qty: 6 EACH | Refills: 3 | Status: SHIPPED | OUTPATIENT
Start: 2024-12-29

## 2024-12-30 RX ORDER — HYDROCHLOROTHIAZIDE 25 MG/1
TABLET ORAL
Qty: 90 TABLET | Refills: 3 | Status: SHIPPED | OUTPATIENT
Start: 2024-12-30

## 2025-01-02 DIAGNOSIS — E78.00 PURE HYPERCHOLESTEROLEMIA, UNSPECIFIED: ICD-10-CM

## 2025-01-02 DIAGNOSIS — I10 ESSENTIAL (PRIMARY) HYPERTENSION: ICD-10-CM

## 2025-01-02 DIAGNOSIS — Z79.4 TYPE 2 DIABETES MELLITUS WITH DIABETIC NEPHROPATHY, WITH LONG-TERM CURRENT USE OF INSULIN (HCC): ICD-10-CM

## 2025-01-02 DIAGNOSIS — E11.21 TYPE 2 DIABETES MELLITUS WITH DIABETIC NEPHROPATHY, WITH LONG-TERM CURRENT USE OF INSULIN (HCC): ICD-10-CM

## 2025-01-20 LAB — HBA1C MFR BLD: 8.5 % (ref 4.8–5.6)

## 2025-01-21 ENCOUNTER — OFFICE VISIT (OUTPATIENT)
Age: 65
End: 2025-01-21
Payer: COMMERCIAL

## 2025-01-21 VITALS
DIASTOLIC BLOOD PRESSURE: 70 MMHG | BODY MASS INDEX: 27.76 KG/M2 | SYSTOLIC BLOOD PRESSURE: 148 MMHG | WEIGHT: 187.4 LBS | HEIGHT: 69 IN | HEART RATE: 61 BPM

## 2025-01-21 DIAGNOSIS — I10 ESSENTIAL (PRIMARY) HYPERTENSION: ICD-10-CM

## 2025-01-21 DIAGNOSIS — E78.00 PURE HYPERCHOLESTEROLEMIA, UNSPECIFIED: ICD-10-CM

## 2025-01-21 DIAGNOSIS — E11.21 TYPE 2 DIABETES MELLITUS WITH DIABETIC NEPHROPATHY, WITH LONG-TERM CURRENT USE OF INSULIN (HCC): Primary | ICD-10-CM

## 2025-01-21 DIAGNOSIS — Z79.4 TYPE 2 DIABETES MELLITUS WITH DIABETIC NEPHROPATHY, WITH LONG-TERM CURRENT USE OF INSULIN (HCC): Primary | ICD-10-CM

## 2025-01-21 LAB
ALBUMIN SERPL-MCNC: 4.2 G/DL (ref 3.9–4.9)
ALBUMIN/CREAT UR: 84 MG/G CREAT (ref 0–29)
ALP SERPL-CCNC: 100 IU/L (ref 44–121)
ALT SERPL-CCNC: 18 IU/L (ref 0–44)
AST SERPL-CCNC: 18 IU/L (ref 0–40)
BILIRUB SERPL-MCNC: 0.5 MG/DL (ref 0–1.2)
BUN SERPL-MCNC: 23 MG/DL (ref 8–27)
BUN/CREAT SERPL: 17 (ref 10–24)
CALCIUM SERPL-MCNC: 9.1 MG/DL (ref 8.6–10.2)
CHLORIDE SERPL-SCNC: 108 MMOL/L (ref 96–106)
CHOLEST SERPL-MCNC: 197 MG/DL (ref 100–199)
CO2 SERPL-SCNC: 22 MMOL/L (ref 20–29)
CREAT SERPL-MCNC: 1.39 MG/DL (ref 0.76–1.27)
CREAT UR-MCNC: 170.9 MG/DL
EGFRCR SERPLBLD CKD-EPI 2021: 56 ML/MIN/1.73
GLOBULIN SER CALC-MCNC: 2.7 G/DL (ref 1.5–4.5)
GLUCOSE SERPL-MCNC: 186 MG/DL (ref 70–99)
HDLC SERPL-MCNC: 72 MG/DL
IMP & REVIEW OF LAB RESULTS: NORMAL
LDLC SERPL CALC-MCNC: 114 MG/DL (ref 0–99)
Lab: NORMAL
MICROALBUMIN UR-MCNC: 143 UG/ML
POTASSIUM SERPL-SCNC: 5.4 MMOL/L (ref 3.5–5.2)
PROT SERPL-MCNC: 6.9 G/DL (ref 6–8.5)
REPORT: NORMAL
REPORT: NORMAL
SODIUM SERPL-SCNC: 143 MMOL/L (ref 134–144)
TRIGL SERPL-MCNC: 62 MG/DL (ref 0–149)
VLDLC SERPL CALC-MCNC: 11 MG/DL (ref 5–40)

## 2025-01-21 PROCEDURE — 95251 CONT GLUC MNTR ANALYSIS I&R: CPT | Performed by: INTERNAL MEDICINE

## 2025-01-21 PROCEDURE — 99214 OFFICE O/P EST MOD 30 MIN: CPT | Performed by: INTERNAL MEDICINE

## 2025-01-21 PROCEDURE — 3078F DIAST BP <80 MM HG: CPT | Performed by: INTERNAL MEDICINE

## 2025-01-21 PROCEDURE — 1123F ACP DISCUSS/DSCN MKR DOCD: CPT | Performed by: INTERNAL MEDICINE

## 2025-01-21 PROCEDURE — 3052F HG A1C>EQUAL 8.0%<EQUAL 9.0%: CPT | Performed by: INTERNAL MEDICINE

## 2025-01-21 PROCEDURE — 3077F SYST BP >= 140 MM HG: CPT | Performed by: INTERNAL MEDICINE

## 2025-01-21 RX ORDER — VALSARTAN 320 MG/1
320 TABLET ORAL DAILY
Qty: 90 TABLET | Refills: 3 | Status: SHIPPED | OUTPATIENT
Start: 2025-01-21

## 2025-01-21 NOTE — PATIENT INSTRUCTIONS
1) Stop the benazepril.  I sent valsartan 320 mg to [facility] to take 1 tab daily in the morning for blood pressure.  Keep taking hydrochlorothiazide (hctz) 25 mg 1 tab daily and amlodipine 10 mg daily.    Start monitoring blood pressure about 2-3 times per week at alternating times either in the morning or evening after resting for 5 minutes and sitting upright in a chair with your arm at heart level. Please let me know if you are having readings over 140 on the top number or 90 on the bottom number after 2 weeks on the new med.    2) Your Hemoglobin A1c (3 month test of blood sugar) has risen from 8.1% to 8.5% and you are still having the mid-day spikes so try to get 14 units everyday at lunch to help with these spikes.    3) Your LDL (bad cholesterol) is lower on the 40 mg of rosuvastatin so stay on this dose.    4) Your creatinine (kidney test) is stable.

## 2025-01-21 NOTE — PROGRESS NOTES
from 7/16 down from 10% in 3/16.  A1c is above goal <7.5-8% but still needs to more consistently get his lunch dose so won't make any changes.  - cont humalog 75/25 mix insulin 34 units before breakfast and 14 units before lunch and 28 units before dinner plus 2 units for every 50 mg/dl above 150 mg/dl (give 10 units at bedtime for after dinner snacking)  - check bs 3 times per day  - foot exam done 7/23  - microalbumin 106 in 3/16, down to 57 in 10/16 and stable in 6/17, up to 109 in 11/17--I added back 20 of benazepril in the form of lotrel 5/20 and down to 58 in 3/19 (will be careful of hyperkalemia as had this in the past) and increased to 10/40 and back to normal at 13.7 in 9/19 but K up to 5.6.  K down to 4.3 in 3/20 and MA 25.  MA up to 258 in 9/21 for unclear reasons but down to 85 in 3/22 and 59 in 1/23 and 84 in 1/25  - eye exam 5/24  - check Hgb A1c and cmp and microalbumin prior to next visit        2. Essential hypertension, benign: his BP was above goal < 140/90 so changed benazepril to valsartan to see if this give better control. Does have history of hyperaldosteronism that was surgically treated by left adrenalectomy in 3/12.    - cont amlodipine 10 mg daily  - stop benazepril 40 mg daily  - begin valsartan 320 mg daily  - cont hctz 25 mg daily  - monitor home blood pressure readings        3. Hypercholesterolemia: Given DM, Goal LDL < 100, non-HDL < 130, and TG < 150.   in 3/16.  Up to 171 in 7/16 with non-compliance but down to 93 in 10/16 with compliance and 94 in 3/17 and 110 in 6/17 and 94 in 9/17 and 112 in 11/17.  Up to 127 in 3/19 due to missing doses and 109 in 9/19 with better compliance.  Up to 116 in 3/20 and down to 106 in 9/20 and 103 in 3/21.  Up to 137 in 9/21 with missing doses due to pill size so stopped lipitor 80 and changed to crestor 20 which is a smaller pill and down to 92 in 3/22.  Up to 146 in 1/23 due to running out for a week prior to lab draw and still 143 in 7/23

## 2025-05-12 ENCOUNTER — TELEPHONE (OUTPATIENT)
Age: 65
End: 2025-05-12

## 2025-05-12 NOTE — TELEPHONE ENCOUNTER
Pt had bloody penis discharge Saturday morning. He is wanting to get a call back at [phone] to see if he could be squeezed in.

## 2025-05-14 ENCOUNTER — OFFICE VISIT (OUTPATIENT)
Age: 65
End: 2025-05-14
Payer: COMMERCIAL

## 2025-05-14 VITALS
SYSTOLIC BLOOD PRESSURE: 139 MMHG | DIASTOLIC BLOOD PRESSURE: 66 MMHG | HEIGHT: 69 IN | HEART RATE: 61 BPM | TEMPERATURE: 98.2 F | BODY MASS INDEX: 27.67 KG/M2 | RESPIRATION RATE: 16 BRPM | OXYGEN SATURATION: 96 %

## 2025-05-14 DIAGNOSIS — E11.21 TYPE 2 DIABETES WITH NEPHROPATHY (HCC): Primary | ICD-10-CM

## 2025-05-14 DIAGNOSIS — R31.0 GROSS HEMATURIA: ICD-10-CM

## 2025-05-14 DIAGNOSIS — Z12.5 PROSTATE CANCER SCREENING: ICD-10-CM

## 2025-05-14 DIAGNOSIS — I10 ESSENTIAL HYPERTENSION, BENIGN: ICD-10-CM

## 2025-05-14 DIAGNOSIS — E78.00 HYPERCHOLESTEROLEMIA: ICD-10-CM

## 2025-05-14 LAB — HBA1C MFR BLD: 8 %

## 2025-05-14 PROCEDURE — 3052F HG A1C>EQUAL 8.0%<EQUAL 9.0%: CPT | Performed by: FAMILY MEDICINE

## 2025-05-14 PROCEDURE — 3075F SYST BP GE 130 - 139MM HG: CPT | Performed by: FAMILY MEDICINE

## 2025-05-14 PROCEDURE — 99213 OFFICE O/P EST LOW 20 MIN: CPT | Performed by: FAMILY MEDICINE

## 2025-05-14 PROCEDURE — 1123F ACP DISCUSS/DSCN MKR DOCD: CPT | Performed by: FAMILY MEDICINE

## 2025-05-14 PROCEDURE — 3078F DIAST BP <80 MM HG: CPT | Performed by: FAMILY MEDICINE

## 2025-05-14 PROCEDURE — 83036 HEMOGLOBIN GLYCOSYLATED A1C: CPT | Performed by: FAMILY MEDICINE

## 2025-05-14 SDOH — ECONOMIC STABILITY: TRANSPORTATION INSECURITY
IN THE PAST 12 MONTHS, HAS LACK OF TRANSPORTATION KEPT YOU FROM MEETINGS, WORK, OR FROM GETTING THINGS NEEDED FOR DAILY LIVING?: NO

## 2025-05-14 SDOH — ECONOMIC STABILITY: FOOD INSECURITY: WITHIN THE PAST 12 MONTHS, THE FOOD YOU BOUGHT JUST DIDN'T LAST AND YOU DIDN'T HAVE MONEY TO GET MORE.: NEVER TRUE

## 2025-05-14 SDOH — ECONOMIC STABILITY: FOOD INSECURITY: WITHIN THE PAST 12 MONTHS, YOU WORRIED THAT YOUR FOOD WOULD RUN OUT BEFORE YOU GOT MONEY TO BUY MORE.: NEVER TRUE

## 2025-05-14 SDOH — ECONOMIC STABILITY: INCOME INSECURITY: IN THE LAST 12 MONTHS, WAS THERE A TIME WHEN YOU WERE NOT ABLE TO PAY THE MORTGAGE OR RENT ON TIME?: NO

## 2025-05-14 SDOH — ECONOMIC STABILITY: TRANSPORTATION INSECURITY
IN THE PAST 12 MONTHS, HAS THE LACK OF TRANSPORTATION KEPT YOU FROM MEDICAL APPOINTMENTS OR FROM GETTING MEDICATIONS?: NO

## 2025-05-14 ASSESSMENT — PATIENT HEALTH QUESTIONNAIRE - PHQ9
2. FEELING DOWN, DEPRESSED OR HOPELESS: NOT AT ALL
SUM OF ALL RESPONSES TO PHQ QUESTIONS 1-9: 0
1. LITTLE INTEREST OR PLEASURE IN DOING THINGS: NOT AT ALL
SUM OF ALL RESPONSES TO PHQ QUESTIONS 1-9: 0

## 2025-05-14 NOTE — PROGRESS NOTES
Fuentes Stubbs (:  1960) is a 64 y.o. male,Established patient, here for evaluation of the following chief complaint(s):  Penile Discharge (BLOOD - SATURDAY 5/10/25 ONLY), Cholesterol Problem, Diabetes, Hypertension, and Follow-up         Assessment & Plan  Type 2 diabetes with nephropathy (HCC)       Orders:    AMB POC HEMOGLOBIN A1C    HM DIABETES FOOT EXAM    Hemoglobin A1C; Future    Gross hematuria       Orders:    Urinalysis with Microscopic; Future    [facility] - Jim Benavides MD, Urology, [place]    Hypercholesterolemia       Orders:    Lipid Panel; Future    Essential hypertension, benign   Chronic, at goal (stable), continue current treatment plan    Orders:    CBC with Auto Differential; Future    Comprehensive Metabolic Panel; Future    Prostate cancer screening       Orders:    PSA Screening; Future      No follow-ups on file.       Subjective   HPI had an episode of bloody urethral discharge after voiding on Saturday am. Only had one episode. No prior hx of similar problems. No frequency, urgency, dysuria. No blood thinners. No iextramarital  sexual activities. No difficulty voiding, straining. Also needs cholesterol , A1c, blood pressure checked.     Review of Systems       Objective   Physical Exam  Cardiovascular:      Rate and Rhythm: Normal rate and regular rhythm.      Heart sounds: Normal heart sounds. No murmur heard.  Pulmonary:      Effort: Pulmonary effort is normal.      Breath sounds: Normal breath sounds.   Abdominal:      General: Abdomen is flat. Bowel sounds are normal.      Palpations: Abdomen is soft.   Genitourinary:     Prostate: Normal.      Rectum: Guaiac result negative.      Comments: Penis- without discharge.   Musculoskeletal:      Right lower leg: No edema.      Left lower leg: No edema.                  An electronic signature was used to authenticate this note.    --Carson Jones MD

## 2025-05-14 NOTE — PROGRESS NOTES
Chief Complaint   Patient presents with    Penile Discharge     BLOOD - SATURDAY 5/10/25 ONLY    Cholesterol Problem    Diabetes    Hypertension    Follow-up       \"Have you been to the ER, urgent care clinic since your last visit?  Hospitalized since your last visit?\"    NO    “Have you seen or consulted any other health care providers outside of [facility] since your last visit?”       GI - COLONOSCOPY 24 - DR. THORPE    ENDOCRINOLOGY - DR. CARSON     25 - ORTHO - DR. MONROE DUFFY - BILATERAL OSTEOARTHITIS KNEES - INJECTIONS BILATERAL KNEES.            Click Here for Release of Records Request       Vitals:    25 1508   BP: 139/66   Pulse: 61   Resp: 16   Temp: 98.2 °F (36.8 °C)   SpO2: 96%      Health Maintenance Due   Topic Date Due    DTaP/Tdap/Td vaccine (1 - Tdap) Never done    Respiratory Syncytial Virus (RSV) Pregnant or age 60 yrs+ (1 - Risk 60-74 years 1-dose series) Never done    Diabetic foot exam  2024    COVID-19 Vaccine (3 - 2024- season) 2025    Depression Screen  2025        The patient, Fuentes Stubbs, identity was verified by name and .

## 2025-05-15 LAB
ALBUMIN SERPL-MCNC: 4.3 G/DL (ref 3.9–4.9)
ALP SERPL-CCNC: 103 IU/L (ref 44–121)
ALT SERPL-CCNC: 19 IU/L (ref 0–44)
APPEARANCE UR: CLEAR
AST SERPL-CCNC: 16 IU/L (ref 0–40)
BACTERIA #/AREA URNS HPF: NORMAL /[HPF]
BASOPHILS # BLD AUTO: 0.1 X10E3/UL (ref 0–0.2)
BASOPHILS NFR BLD AUTO: 1 %
BILIRUB SERPL-MCNC: 0.2 MG/DL (ref 0–1.2)
BILIRUB UR QL STRIP: NEGATIVE
BUN SERPL-MCNC: 46 MG/DL (ref 8–27)
BUN/CREAT SERPL: 28 (ref 10–24)
CALCIUM SERPL-MCNC: 8.8 MG/DL (ref 8.6–10.2)
CASTS URNS QL MICRO: NORMAL /LPF
CHLORIDE SERPL-SCNC: 108 MMOL/L (ref 96–106)
CHOLEST SERPL-MCNC: 186 MG/DL (ref 100–199)
CO2 SERPL-SCNC: 18 MMOL/L (ref 20–29)
COLOR UR: YELLOW
CREAT SERPL-MCNC: 1.67 MG/DL (ref 0.76–1.27)
EGFRCR SERPLBLD CKD-EPI 2021: 45 ML/MIN/1.73
EOSINOPHIL # BLD AUTO: 0.2 X10E3/UL (ref 0–0.4)
EOSINOPHIL NFR BLD AUTO: 3 %
EPI CELLS #/AREA URNS HPF: NORMAL /HPF (ref 0–10)
ERYTHROCYTE [DISTWIDTH] IN BLOOD BY AUTOMATED COUNT: 11.2 % (ref 11.6–15.4)
GLOBULIN SER CALC-MCNC: 2.8 G/DL (ref 1.5–4.5)
GLUCOSE SERPL-MCNC: 324 MG/DL (ref 70–99)
GLUCOSE UR QL STRIP: NEGATIVE
HCT VFR BLD AUTO: 35.9 % (ref 37.5–51)
HDLC SERPL-MCNC: 56 MG/DL
HGB BLD-MCNC: 11.3 G/DL (ref 13–17.7)
HGB UR QL STRIP: NEGATIVE
IMM GRANULOCYTES # BLD AUTO: 0 X10E3/UL (ref 0–0.1)
IMM GRANULOCYTES NFR BLD AUTO: 0 %
IMP & REVIEW OF LAB RESULTS: NORMAL
KETONES UR QL STRIP: NEGATIVE
LDLC SERPL CALC-MCNC: 106 MG/DL (ref 0–99)
LEUKOCYTE ESTERASE UR QL STRIP: NEGATIVE
LYMPHOCYTES # BLD AUTO: 1.5 X10E3/UL (ref 0.7–3.1)
LYMPHOCYTES NFR BLD AUTO: 26 %
MCH RBC QN AUTO: 31 PG (ref 26.6–33)
MCHC RBC AUTO-ENTMCNC: 31.5 G/DL (ref 31.5–35.7)
MCV RBC AUTO: 99 FL (ref 79–97)
MICRO URNS: NORMAL
MICRO URNS: NORMAL
MONOCYTES # BLD AUTO: 0.4 X10E3/UL (ref 0.1–0.9)
MONOCYTES NFR BLD AUTO: 6 %
NEUTROPHILS # BLD AUTO: 3.5 X10E3/UL (ref 1.4–7)
NEUTROPHILS NFR BLD AUTO: 64 %
NITRITE UR QL STRIP: NEGATIVE
PH UR STRIP: 5.5 [PH] (ref 5–7.5)
PLATELET # BLD AUTO: 237 X10E3/UL (ref 150–450)
POTASSIUM SERPL-SCNC: 6 MMOL/L (ref 3.5–5.2)
PROT SERPL-MCNC: 7.1 G/DL (ref 6–8.5)
PROT UR QL STRIP: NEGATIVE
PSA SERPL-MCNC: 2.9 NG/ML (ref 0–4)
RBC # BLD AUTO: 3.64 X10E6/UL (ref 4.14–5.8)
RBC #/AREA URNS HPF: NORMAL /HPF (ref 0–2)
REPORT: NORMAL
REPORT: NORMAL
SODIUM SERPL-SCNC: 139 MMOL/L (ref 134–144)
SP GR UR STRIP: 1.02 (ref 1–1.03)
TRIGL SERPL-MCNC: 139 MG/DL (ref 0–149)
UROBILINOGEN UR STRIP-MCNC: 0.2 MG/DL (ref 0.2–1)
VLDLC SERPL CALC-MCNC: 24 MG/DL (ref 5–40)
WBC # BLD AUTO: 5.6 X10E3/UL (ref 3.4–10.8)
WBC #/AREA URNS HPF: NORMAL /HPF (ref 0–5)

## 2025-05-19 ENCOUNTER — RESULTS FOLLOW-UP (OUTPATIENT)
Age: 65
End: 2025-05-19

## 2025-05-19 RX ORDER — CLONIDINE HYDROCHLORIDE 0.1 MG/1
TABLET ORAL
Qty: 60 TABLET | Refills: 3 | Status: SHIPPED | OUTPATIENT
Start: 2025-05-19

## 2025-05-19 NOTE — PROGRESS NOTES
Samuel rosales pt. Will dc valsartan and start clonidine in view of hyperkalemia. Will defer management of diabetes to Dr. Martinez. A1C 8.0.

## 2025-06-02 NOTE — TELEPHONE ENCOUNTER
Requested Prescriptions     Pending Prescriptions Disp Refills    insulin lispro protamine & lispro (HUMALOG MIX 75/25) (75-25) 100 UNIT per ML SUSP injection vial 90 mL 3     Sig: Inject 34 units before breakfast and 14 units before lunch and 28 units before dinner plus 2 units for every 50 mg/dl above 150 mg/dl (give 10 units at bedtime for after dinner snacking)--max 100 units/day

## 2025-07-01 DIAGNOSIS — I10 ESSENTIAL (PRIMARY) HYPERTENSION: ICD-10-CM

## 2025-07-01 DIAGNOSIS — E11.21 TYPE 2 DIABETES MELLITUS WITH DIABETIC NEPHROPATHY, WITH LONG-TERM CURRENT USE OF INSULIN (HCC): ICD-10-CM

## 2025-07-01 DIAGNOSIS — Z79.4 TYPE 2 DIABETES MELLITUS WITH DIABETIC NEPHROPATHY, WITH LONG-TERM CURRENT USE OF INSULIN (HCC): ICD-10-CM

## 2025-07-01 DIAGNOSIS — E78.00 PURE HYPERCHOLESTEROLEMIA, UNSPECIFIED: ICD-10-CM

## 2025-08-18 RX ORDER — CLONIDINE HYDROCHLORIDE 0.1 MG/1
TABLET ORAL
Qty: 180 TABLET | Refills: 1 | Status: SHIPPED | OUTPATIENT
Start: 2025-08-18